# Patient Record
Sex: FEMALE | Race: ASIAN | NOT HISPANIC OR LATINO
[De-identification: names, ages, dates, MRNs, and addresses within clinical notes are randomized per-mention and may not be internally consistent; named-entity substitution may affect disease eponyms.]

---

## 2017-02-21 ENCOUNTER — APPOINTMENT (OUTPATIENT)
Dept: CT IMAGING | Facility: CLINIC | Age: 72
End: 2017-02-21

## 2017-02-21 ENCOUNTER — OUTPATIENT (OUTPATIENT)
Dept: OUTPATIENT SERVICES | Facility: HOSPITAL | Age: 72
LOS: 1 days | End: 2017-02-21
Payer: COMMERCIAL

## 2017-02-21 DIAGNOSIS — Z00.8 ENCOUNTER FOR OTHER GENERAL EXAMINATION: ICD-10-CM

## 2017-02-21 PROCEDURE — 74176 CT ABD & PELVIS W/O CONTRAST: CPT

## 2017-04-20 ENCOUNTER — INPATIENT (INPATIENT)
Facility: HOSPITAL | Age: 72
LOS: 1 days | Discharge: HOME | DRG: 69 | End: 2017-04-22
Attending: INTERNAL MEDICINE | Admitting: INTERNAL MEDICINE
Payer: COMMERCIAL

## 2017-04-20 VITALS
SYSTOLIC BLOOD PRESSURE: 96 MMHG | HEIGHT: 62 IN | OXYGEN SATURATION: 96 % | WEIGHT: 110.01 LBS | DIASTOLIC BLOOD PRESSURE: 56 MMHG | RESPIRATION RATE: 16 BRPM | TEMPERATURE: 98 F | HEART RATE: 68 BPM

## 2017-04-20 DIAGNOSIS — I63.9 CEREBRAL INFARCTION, UNSPECIFIED: ICD-10-CM

## 2017-04-20 DIAGNOSIS — G45.9 TRANSIENT CEREBRAL ISCHEMIC ATTACK, UNSPECIFIED: ICD-10-CM

## 2017-04-20 LAB
ALBUMIN SERPL ELPH-MCNC: 3.3 G/DL — SIGNIFICANT CHANGE UP (ref 3.3–5)
ALP SERPL-CCNC: 64 U/L — SIGNIFICANT CHANGE UP (ref 30–120)
ALT FLD-CCNC: 17 U/L DA — SIGNIFICANT CHANGE UP (ref 10–60)
ANION GAP SERPL CALC-SCNC: 9 MMOL/L — SIGNIFICANT CHANGE UP (ref 5–17)
APTT BLD: 28.4 SEC — SIGNIFICANT CHANGE UP (ref 27.5–37.4)
AST SERPL-CCNC: 23 U/L — SIGNIFICANT CHANGE UP (ref 10–40)
BASOPHILS # BLD AUTO: 0 K/UL — SIGNIFICANT CHANGE UP (ref 0–0.2)
BASOPHILS NFR BLD AUTO: 0.6 % — SIGNIFICANT CHANGE UP (ref 0–2)
BILIRUB SERPL-MCNC: 0.2 MG/DL — SIGNIFICANT CHANGE UP (ref 0.2–1.2)
BUN SERPL-MCNC: 26 MG/DL — HIGH (ref 7–23)
CALCIUM SERPL-MCNC: 9 MG/DL — SIGNIFICANT CHANGE UP (ref 8.4–10.5)
CHLORIDE SERPL-SCNC: 108 MMOL/L — SIGNIFICANT CHANGE UP (ref 96–108)
CO2 SERPL-SCNC: 27 MMOL/L — SIGNIFICANT CHANGE UP (ref 22–31)
CREAT SERPL-MCNC: 0.93 MG/DL — SIGNIFICANT CHANGE UP (ref 0.5–1.3)
EOSINOPHIL # BLD AUTO: 0 K/UL — SIGNIFICANT CHANGE UP (ref 0–0.5)
EOSINOPHIL NFR BLD AUTO: 0.6 % — SIGNIFICANT CHANGE UP (ref 0–6)
ERYTHROCYTE [SEDIMENTATION RATE] IN BLOOD: 3 MM/HR — SIGNIFICANT CHANGE UP (ref 0–20)
GLUCOSE SERPL-MCNC: 110 MG/DL — HIGH (ref 70–99)
HCT VFR BLD CALC: 41.9 % — SIGNIFICANT CHANGE UP (ref 34.5–45)
HGB BLD-MCNC: 13.9 G/DL — SIGNIFICANT CHANGE UP (ref 11.5–15.5)
INR BLD: 0.96 RATIO — SIGNIFICANT CHANGE UP (ref 0.88–1.16)
LYMPHOCYTES # BLD AUTO: 1.1 K/UL — SIGNIFICANT CHANGE UP (ref 1–3.3)
LYMPHOCYTES # BLD AUTO: 15.2 % — SIGNIFICANT CHANGE UP (ref 13–44)
MCHC RBC-ENTMCNC: 29.9 PG — SIGNIFICANT CHANGE UP (ref 27–34)
MCHC RBC-ENTMCNC: 33.2 GM/DL — SIGNIFICANT CHANGE UP (ref 32–36)
MCV RBC AUTO: 90.1 FL — SIGNIFICANT CHANGE UP (ref 80–100)
MONOCYTES # BLD AUTO: 0.3 K/UL — SIGNIFICANT CHANGE UP (ref 0–0.9)
MONOCYTES NFR BLD AUTO: 4.6 % — SIGNIFICANT CHANGE UP (ref 2–14)
NEUTROPHILS # BLD AUTO: 5.8 K/UL — SIGNIFICANT CHANGE UP (ref 1.8–7.4)
NEUTROPHILS NFR BLD AUTO: 78.9 % — HIGH (ref 43–77)
PLATELET # BLD AUTO: 261 K/UL — SIGNIFICANT CHANGE UP (ref 150–400)
POTASSIUM SERPL-MCNC: 4.1 MMOL/L — SIGNIFICANT CHANGE UP (ref 3.5–5.3)
POTASSIUM SERPL-SCNC: 4.1 MMOL/L — SIGNIFICANT CHANGE UP (ref 3.5–5.3)
PROT SERPL-MCNC: 6.8 G/DL — SIGNIFICANT CHANGE UP (ref 6–8.3)
PROTHROM AB SERPL-ACNC: 10.4 SEC — SIGNIFICANT CHANGE UP (ref 9.8–12.7)
RBC # BLD: 4.65 M/UL — SIGNIFICANT CHANGE UP (ref 3.8–5.2)
RBC # FLD: 13 % — SIGNIFICANT CHANGE UP (ref 10.3–14.5)
SODIUM SERPL-SCNC: 144 MMOL/L — SIGNIFICANT CHANGE UP (ref 135–145)
WBC # BLD: 7.3 K/UL — SIGNIFICANT CHANGE UP (ref 3.8–10.5)
WBC # FLD AUTO: 7.3 K/UL — SIGNIFICANT CHANGE UP (ref 3.8–10.5)

## 2017-04-20 PROCEDURE — 93880 EXTRACRANIAL BILAT STUDY: CPT | Mod: 26

## 2017-04-20 PROCEDURE — 93306 TTE W/DOPPLER COMPLETE: CPT | Mod: 26

## 2017-04-20 PROCEDURE — 99291 CRITICAL CARE FIRST HOUR: CPT

## 2017-04-20 PROCEDURE — 70450 CT HEAD/BRAIN W/O DYE: CPT | Mod: 26

## 2017-04-20 PROCEDURE — 93010 ELECTROCARDIOGRAM REPORT: CPT

## 2017-04-20 RX ORDER — ASPIRIN/CALCIUM CARB/MAGNESIUM 324 MG
81 TABLET ORAL DAILY
Qty: 0 | Refills: 0 | Status: DISCONTINUED | OUTPATIENT
Start: 2017-04-20 | End: 2017-04-20

## 2017-04-20 RX ORDER — SODIUM CHLORIDE 9 MG/ML
1000 INJECTION INTRAMUSCULAR; INTRAVENOUS; SUBCUTANEOUS
Qty: 0 | Refills: 0 | Status: DISCONTINUED | OUTPATIENT
Start: 2017-04-20 | End: 2017-04-22

## 2017-04-20 RX ORDER — ATORVASTATIN CALCIUM 80 MG/1
10 TABLET, FILM COATED ORAL AT BEDTIME
Qty: 0 | Refills: 0 | Status: DISCONTINUED | OUTPATIENT
Start: 2017-04-20 | End: 2017-04-22

## 2017-04-20 RX ORDER — ASPIRIN/CALCIUM CARB/MAGNESIUM 324 MG
325 TABLET ORAL DAILY
Qty: 0 | Refills: 0 | Status: DISCONTINUED | OUTPATIENT
Start: 2017-04-20 | End: 2017-04-22

## 2017-04-20 RX ORDER — ENOXAPARIN SODIUM 100 MG/ML
40 INJECTION SUBCUTANEOUS DAILY
Qty: 0 | Refills: 0 | Status: DISCONTINUED | OUTPATIENT
Start: 2017-04-20 | End: 2017-04-22

## 2017-04-20 RX ORDER — ASPIRIN/CALCIUM CARB/MAGNESIUM 324 MG
324 TABLET ORAL DAILY
Qty: 0 | Refills: 0 | Status: DISCONTINUED | OUTPATIENT
Start: 2017-04-20 | End: 2017-04-20

## 2017-04-20 RX ADMIN — SODIUM CHLORIDE 75 MILLILITER(S): 9 INJECTION INTRAMUSCULAR; INTRAVENOUS; SUBCUTANEOUS at 21:35

## 2017-04-20 RX ADMIN — ENOXAPARIN SODIUM 40 MILLIGRAM(S): 100 INJECTION SUBCUTANEOUS at 21:32

## 2017-04-20 RX ADMIN — Medication 325 MILLIGRAM(S): at 21:44

## 2017-04-20 RX ADMIN — ATORVASTATIN CALCIUM 10 MILLIGRAM(S): 80 TABLET, FILM COATED ORAL at 21:33

## 2017-04-20 NOTE — ED ADULT NURSE NOTE - OBJECTIVE STATEMENT
Pt with no past hx; son states difficulty speaking. As per pt's son, pt went to sleep at approximately 14:00 and when she woke up at 15:00, she was unable to express herself and her speech may have been garbled. Pt is experiencing no overt weakness. PERRL B/L upper & lower strength + & =  sensory intact. Pt with presenting symptoms of expressive aphasia.

## 2017-04-20 NOTE — ED PROVIDER NOTE - MEDICAL DECISION MAKING DETAILS
spoke at length with family,rads and neuro...initially, time of stroke seemed to indicate that thrombolysis was an option.however, pts sx did improve and then son stated that sx started earlier and, therefore, outside the window of thrombolytic use.

## 2017-04-20 NOTE — H&P ADULT - FAMILY HISTORY
Father  Still living? No  Cerebrovascular accident (CVA), Age at diagnosis: Age Unknown     Sibling  Still living? Yes, Estimated age: Age Unknown  Cerebrovascular accident (CVA), Age at diagnosis: Age Unknown

## 2017-04-20 NOTE — H&P ADULT - HISTORY OF PRESENT ILLNESS
72 yr old with no significant past medical history except pyelonephritis in 2014, on no medication, generally in good health, today at 1 pm felt dizzy so took nap and when wpke up was having aphasia and was unable to get up from chair, and as per son she was making no sense with her speech, patient is chinese speaking .   In ED patient was still having aphasia, had CT head which was negative for CVA, patient improved in about 40 minutes to darinel 90 percent as per son, she is able to speak now and has full poer in all extremeties.  NIHSS scal 2 at admission.  has strong family h/o CVA father and brother had CVA and paralysis  no seizure no incontinence, no h/o HTN, HLd

## 2017-04-20 NOTE — ED PROVIDER NOTE - DETAILS:
Uri Bedoya MD - The scribe's documentation has been prepared under my direction and personally reviewed by me in its entirety. I confirm that the note above accurately reflects all work, treatment, procedures, and medical decision making performed by me.

## 2017-04-20 NOTE — ED ADULT NURSE NOTE - CHPI ED SYMPTOMS NEG
no dizziness/no numbness/no fever/no blurred vision/no loss of consciousness/no weakness/no nausea/no vomiting

## 2017-04-20 NOTE — H&P ADULT - ATTENDING COMMENTS
I have discussed care plan with patient and HCP,expressed understanding of problems treatment and their effect and side effects, alternatives in detail,I have asked if they have any questions and concerns and appropriately addressed them to best of my ability  Reviewed all diagonostic tests, lab results and drug drug interactions, and medications  90 minutes spent on this visit, 50% visit time spent in care co-ordination with other attendings and counselling patient

## 2017-04-20 NOTE — H&P ADULT - ASSESSMENT
72 yr old with no significant past history with strong family h/o CVA, admitted with expressive aphasia with some weakness,admitted for further management, possible TIA,

## 2017-04-20 NOTE — CONSULT NOTE ADULT - SUBJECTIVE AND OBJECTIVE BOX
Clinical impression is most likely tia rule out cerebrovascular accident.    I would recommend telemetry evaluation to rule out underlying arrhythmia.  I would recommend echocardiogram to evaluate ejection fraction.  I would recommend carotid Doppler's to evaluate for carotid stenosis.  I would recommend MRI/MRA of the brain.  I would recommend to check TSH and lipid panel.  I will start the patient on aspirin 325 mg once a day.  I will recommend cholesterol medications.  keep SBP above 150 and below 190 if possible

## 2017-04-20 NOTE — ED PROVIDER NOTE - OBJECTIVE STATEMENT
71 y/o F pt with no past history presents to the ED c/o expressive aphasia. As per pt's son, pt went to sleep at approximately 14:00 and when she woke up at 15:00, she was unable to express herself and her speech may have been garbled. Pt is experiencing no overt weakness. 73 y/o chinese F pt with no past history presents to the ED with inability to speak/express self.. As per pt's son, and , she was unable to express herself and her speech may have been garbled. Pt is experiencing no overt weakness.no ha,fever..sx began at approx 1p.m.(3hrs and 40 mins before er arrival)In emergency dept pt was able to speak more after being in er for 40 minutes than when she entered er.

## 2017-04-20 NOTE — ED PROVIDER NOTE - NEUROLOGICAL COORDINATION
Discharge instructions given to pt. pt verbalized understanding discharge instructions. Patient left emergency department by foot  With self, in good condition. 3 Prescriptions given. Armband removed and shredded. normal

## 2017-04-20 NOTE — ED ADULT NURSE REASSESSMENT NOTE - NS ED NURSE REASSESS COMMENT FT1
As per son, pt much better than when she came in; speaking well with very mild hesitation; not fully at baseline at this time.  Admitting MD at bedside.

## 2017-04-20 NOTE — STROKE CODE NOTE - NIH STROKE SCALE: 9. BEST LANGUAGE, QM
(2) Severe aphasia; all communication is through fragmentary expression; great need for inference, questioning, and guessing by the listener. Range of information that can be exchanged is limited; listener carries burden of communication. Examiner cannot identify materials provided from patient response.
(2) Severe aphasia; all communication is through fragmentary expression; great need for inference, questioning, and guessing by the listener. Range of information that can be exchanged is limited; listener carries burden of communication. Examiner cannot identify materials provided from patient response.

## 2017-04-20 NOTE — ED PROVIDER NOTE - CRITICAL CARE PROVIDED
consultation with other physicians/consult w/ pt's family directly relating to pts condition/direct patient care (not related to procedure)/documentation/interpretation of diagnostic studies/additional history taking

## 2017-04-21 ENCOUNTER — OUTPATIENT (OUTPATIENT)
Dept: OUTPATIENT SERVICES | Facility: HOSPITAL | Age: 72
LOS: 1 days | End: 2017-04-21
Payer: COMMERCIAL

## 2017-04-21 DIAGNOSIS — I63.9 CEREBRAL INFARCTION, UNSPECIFIED: ICD-10-CM

## 2017-04-21 DIAGNOSIS — I09.9 RHEUMATIC HEART DISEASE, UNSPECIFIED: ICD-10-CM

## 2017-04-21 LAB
ANION GAP SERPL CALC-SCNC: 9 MMOL/L — SIGNIFICANT CHANGE UP (ref 5–17)
BUN SERPL-MCNC: 19 MG/DL — SIGNIFICANT CHANGE UP (ref 7–23)
CALCIUM SERPL-MCNC: 8.6 MG/DL — SIGNIFICANT CHANGE UP (ref 8.4–10.5)
CHLORIDE SERPL-SCNC: 110 MMOL/L — HIGH (ref 96–108)
CHOLEST SERPL-MCNC: 160 MG/DL — SIGNIFICANT CHANGE UP (ref 10–199)
CO2 SERPL-SCNC: 27 MMOL/L — SIGNIFICANT CHANGE UP (ref 22–31)
CREAT SERPL-MCNC: 0.86 MG/DL — SIGNIFICANT CHANGE UP (ref 0.5–1.3)
GLUCOSE SERPL-MCNC: 95 MG/DL — SIGNIFICANT CHANGE UP (ref 70–99)
HCT VFR BLD CALC: 40.2 % — SIGNIFICANT CHANGE UP (ref 34.5–45)
HDLC SERPL-MCNC: 97 MG/DL — SIGNIFICANT CHANGE UP (ref 40–125)
HGB BLD-MCNC: 13.6 G/DL — SIGNIFICANT CHANGE UP (ref 11.5–15.5)
LIPID PNL WITH DIRECT LDL SERPL: 50 MG/DL — SIGNIFICANT CHANGE UP
MCHC RBC-ENTMCNC: 30.4 PG — SIGNIFICANT CHANGE UP (ref 27–34)
MCHC RBC-ENTMCNC: 33.8 GM/DL — SIGNIFICANT CHANGE UP (ref 32–36)
MCV RBC AUTO: 89.9 FL — SIGNIFICANT CHANGE UP (ref 80–100)
PLATELET # BLD AUTO: 246 K/UL — SIGNIFICANT CHANGE UP (ref 150–400)
POTASSIUM SERPL-MCNC: 4.1 MMOL/L — SIGNIFICANT CHANGE UP (ref 3.5–5.3)
POTASSIUM SERPL-SCNC: 4.1 MMOL/L — SIGNIFICANT CHANGE UP (ref 3.5–5.3)
RBC # BLD: 4.46 M/UL — SIGNIFICANT CHANGE UP (ref 3.8–5.2)
RBC # FLD: 13.1 % — SIGNIFICANT CHANGE UP (ref 10.3–14.5)
SODIUM SERPL-SCNC: 146 MMOL/L — HIGH (ref 135–145)
TOTAL CHOLESTEROL/HDL RATIO MEASUREMENT: 1.6 RATIO — LOW (ref 3.3–7.1)
TRIGL SERPL-MCNC: 67 MG/DL — SIGNIFICANT CHANGE UP (ref 10–149)
VIT B12 SERPL-MCNC: 439 PG/ML — SIGNIFICANT CHANGE UP (ref 243–894)
WBC # BLD: 6.2 K/UL — SIGNIFICANT CHANGE UP (ref 3.8–10.5)
WBC # FLD AUTO: 6.2 K/UL — SIGNIFICANT CHANGE UP (ref 3.8–10.5)

## 2017-04-21 PROCEDURE — 70551 MRI BRAIN STEM W/O DYE: CPT | Mod: 26

## 2017-04-21 PROCEDURE — 70551 MRI BRAIN STEM W/O DYE: CPT

## 2017-04-21 PROCEDURE — 70544 MR ANGIOGRAPHY HEAD W/O DYE: CPT | Mod: 26,59

## 2017-04-21 PROCEDURE — 70544 MR ANGIOGRAPHY HEAD W/O DYE: CPT

## 2017-04-21 RX ADMIN — SODIUM CHLORIDE 75 MILLILITER(S): 9 INJECTION INTRAMUSCULAR; INTRAVENOUS; SUBCUTANEOUS at 10:15

## 2017-04-21 RX ADMIN — ENOXAPARIN SODIUM 40 MILLIGRAM(S): 100 INJECTION SUBCUTANEOUS at 11:16

## 2017-04-21 RX ADMIN — Medication 325 MILLIGRAM(S): at 11:17

## 2017-04-21 RX ADMIN — ATORVASTATIN CALCIUM 10 MILLIGRAM(S): 80 TABLET, FILM COATED ORAL at 21:15

## 2017-04-21 NOTE — DISCHARGE NOTE ADULT - CARE PROVIDER_API CALL
Martir Dixon (MD), Internal Medicine  [address]  [place] [address]  Phone: [phone]  Fax: [phone]    Emely Lim (MBEMANULE; MD), Internal Medicine  [address] [place] [address]  Phone: [phone]  Fax: [phone]

## 2017-04-21 NOTE — PHYSICAL THERAPY INITIAL EVALUATION ADULT - ADDITIONAL COMMENTS
pvt home 2 story, no steps to enter, bedroom main floor. 1 flight 11 steps to basement w/ HR. Pt does not drive. Pt does not have and DME.

## 2017-04-21 NOTE — DISCHARGE NOTE ADULT - NS AS DC STROKE ED MATERIALS
Prescribed Medications/Advancing age is a risk factor for strokes/Stroke Education Booklet/Call 911 for Stroke/Need for Followup After Discharge/Stroke Warning Signs and Symptoms/Risk Factors for Stroke Prescribed Medications/Stroke Education Booklet/Risk Factors for Stroke/Stroke Warning Signs and Symptoms/Call 911 for Stroke/Need for Followup After Discharge

## 2017-04-21 NOTE — DISCHARGE NOTE ADULT - HOSPITAL COURSE
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

## 2017-04-21 NOTE — DISCHARGE NOTE ADULT - PATIENT PORTAL LINK FT
“You can access the [facility] Patient Portal, offered by [facility], by registering with the following website: [email or web address]”

## 2017-04-21 NOTE — CONSULT NOTE ADULT - SUBJECTIVE AND OBJECTIVE BOX
History of Present Illness: The patient is a 72 year old female with no prior past medical history who presents with possible stroke. Yesterday, she had two episodes of dizziness associated with right leg weakness. She also noted some difficulty speaking, but denies visual changes, numbness, weakness in other body parts. She denies shortness of breath, palpitations, chest pain. No prior cardiac history. Unlimited exercise tolerance.    Past Medical/Surgical History:  None    Medications:  None    Family History: Non-contributory family history of premature cardiovascular atherosclerotic disease    Social History: No tobacco, alcohol or drug use    Review of Systems:  General: No fevers, chills, weight loss or gain  Skin: No rashes, color changes  Cardiovascular: No chest pain, orthopnea  Respiratory: No shortness of breath, cough  Gastrointestinal: No nausea, abdominal pain  Genitourinary: No incontinence, pain with urination  Musculoskeletal: No pain, swelling, decreased range of motion  Neurological: No headache, weakness  Psychiatric: No depression, anxiety  Endocrine: No weight loss or gain, increased thirst  All other systems are comprehensively negative.    Physical Exam:  Vitals:        Vital Signs Last 24 Hrs  T(C): 36.8, Max: 36.9 (04-20 @ 16:33)  T(F): 98.3, Max: 98.5 (04-20 @ 16:33)  HR: 72 (60 - 72)  BP: 105/60 (96/56 - 120/71)  BP(mean): --  RR: 17 (13 - 23)  SpO2: 99% (96% - 99%)  General: NAD  Neck: No JVD, no carotid bruit  Lungs: CTAB  CV: RRR, nl S1/S2, no M/R/G  Abdomen: S/NT/ND, +BS  Extremities: No LE edema, no cyanosis    Labs:                        13.6   6.2   )-----------( 246      ( 21 Apr 2017 06:43 )             40.2     04-21    146<H>  |  110<H>  |  19  ----------------------------<  95  4.1   |  27  |  0.86    Ca    8.6      21 Apr 2017 06:43    TPro  6.8  /  Alb  3.3  /  TBili  0.2  /  DBili  x   /  AST  23  /  ALT  17  /  AlkPhos  64  04-20        PT/INR - ( 20 Apr 2017 16:59 )   PT: 10.4 sec;   INR: 0.96 ratio         PTT - ( 20 Apr 2017 16:59 )  PTT:28.4 sec

## 2017-04-21 NOTE — CONSULT NOTE ADULT - ASSESSMENT
The patient is a 72 year old female with no prior medical history who presents with possible TIA/CVA. An echocardiogram was done revealing severe mitral stenosis (MVA 0.99 cm2, mean gradient 13 mmHg) with a severely dilated left atrium. There is no evidence of atrial arrhythmias; however, she is at high risk for this and thromboembolic disease given the presence of severe rheumatic MS with a severely dilated LA.    Plan:  - Continue to monitor on telemetry  - If there is evidence of CVA on MRI, would initiate anticoagulation with warfarin as there is presumably underlying AF. If MRI is negative and no atrial arrhythmias documented, would continue aspirin.  - No need for beta blockers or diuretics at this time as the patient's resting heart rate is in low 60s and she is asymptomatic.  - Consider outpatient stress echocardiogram to assess for symptoms and change in pulmonary pressures with exertion

## 2017-04-21 NOTE — SWALLOW BEDSIDE ASSESSMENT ADULT - COMMENTS
Pt A+Ox4.  Pt is Mandarin speaking; her son was at bedside and translated.  Pt admitted with aphasia, possible TIA.  Pt's swallow function WNL.  She is safely tolerating regular consistencies with thin liquids.

## 2017-04-21 NOTE — PHYSICAL THERAPY INITIAL EVALUATION ADULT - CRITERIA FOR SKILLED THERAPEUTIC INTERVENTIONS
anticipated discharge recommendation/anticipated equipment needs at discharge/d/c home no PT needs no DME required

## 2017-04-22 VITALS
RESPIRATION RATE: 18 BRPM | SYSTOLIC BLOOD PRESSURE: 98 MMHG | TEMPERATURE: 98 F | DIASTOLIC BLOOD PRESSURE: 69 MMHG | HEART RATE: 70 BPM | OXYGEN SATURATION: 96 %

## 2017-04-22 LAB
CHOLEST SERPL-MCNC: 138 MG/DL — SIGNIFICANT CHANGE UP (ref 10–199)
HDLC SERPL-MCNC: 79 MG/DL — SIGNIFICANT CHANGE UP (ref 40–125)
LIPID PNL WITH DIRECT LDL SERPL: 47 MG/DL — SIGNIFICANT CHANGE UP
TOTAL CHOLESTEROL/HDL RATIO MEASUREMENT: 1.7 RATIO — LOW (ref 3.3–7.1)
TRIGL SERPL-MCNC: 59 MG/DL — SIGNIFICANT CHANGE UP (ref 10–149)

## 2017-04-22 PROCEDURE — 85652 RBC SED RATE AUTOMATED: CPT

## 2017-04-22 PROCEDURE — 93005 ELECTROCARDIOGRAM TRACING: CPT

## 2017-04-22 PROCEDURE — 93880 EXTRACRANIAL BILAT STUDY: CPT

## 2017-04-22 PROCEDURE — 85027 COMPLETE CBC AUTOMATED: CPT

## 2017-04-22 PROCEDURE — 93306 TTE W/DOPPLER COMPLETE: CPT

## 2017-04-22 PROCEDURE — 85730 THROMBOPLASTIN TIME PARTIAL: CPT

## 2017-04-22 PROCEDURE — 96372 THER/PROPH/DIAG INJ SC/IM: CPT

## 2017-04-22 PROCEDURE — 80061 LIPID PANEL: CPT

## 2017-04-22 PROCEDURE — 99291 CRITICAL CARE FIRST HOUR: CPT | Mod: 25

## 2017-04-22 PROCEDURE — 80048 BASIC METABOLIC PNL TOTAL CA: CPT

## 2017-04-22 PROCEDURE — 85610 PROTHROMBIN TIME: CPT

## 2017-04-22 PROCEDURE — 97161 PT EVAL LOW COMPLEX 20 MIN: CPT

## 2017-04-22 PROCEDURE — 82607 VITAMIN B-12: CPT

## 2017-04-22 PROCEDURE — 80053 COMPREHEN METABOLIC PANEL: CPT

## 2017-04-22 PROCEDURE — 70450 CT HEAD/BRAIN W/O DYE: CPT

## 2017-04-22 RX ORDER — ATORVASTATIN CALCIUM 80 MG/1
1 TABLET, FILM COATED ORAL
Qty: 0 | Refills: 0 | COMMUNITY
Start: 2017-04-22

## 2017-04-22 RX ORDER — ASPIRIN/CALCIUM CARB/MAGNESIUM 324 MG
1 TABLET ORAL
Qty: 30 | Refills: 0
Start: 2017-04-22 | End: 2017-05-22

## 2017-04-22 RX ORDER — ASPIRIN/CALCIUM CARB/MAGNESIUM 324 MG
1 TABLET ORAL
Qty: 0 | Refills: 0 | COMMUNITY
Start: 2017-04-22

## 2017-04-22 RX ORDER — ATORVASTATIN CALCIUM 80 MG/1
1 TABLET, FILM COATED ORAL
Qty: 30 | Refills: 0
Start: 2017-04-22 | End: 2017-05-22

## 2017-04-22 RX ADMIN — ENOXAPARIN SODIUM 40 MILLIGRAM(S): 100 INJECTION SUBCUTANEOUS at 11:25

## 2017-04-22 RX ADMIN — SODIUM CHLORIDE 75 MILLILITER(S): 9 INJECTION INTRAMUSCULAR; INTRAVENOUS; SUBCUTANEOUS at 11:25

## 2017-04-22 RX ADMIN — Medication 325 MILLIGRAM(S): at 11:25

## 2017-04-22 RX ADMIN — SODIUM CHLORIDE 75 MILLILITER(S): 9 INJECTION INTRAMUSCULAR; INTRAVENOUS; SUBCUTANEOUS at 05:19

## 2017-04-22 NOTE — PROGRESS NOTE ADULT - ATTENDING COMMENTS
I have discussed care plan with patient and HCP,expressed understanding of problems treatment and their effect and side effects, alternatives in detail,I have asked if they have any questions and concerns and appropriately addressed them to best of my ability  Reviewed all diagonostic tests, lab results and drug drug interactions, and medications  60 minutes spent on this visit, 50% visit time spent in care co-ordination with other attendings and counselling patient  discharge plan discussed with Patient and family
45 minutes spent on this visit, 50% visit time spent in care co-ordination with other attendings and counselling patient

## 2017-04-22 NOTE — PROGRESS NOTE ADULT - SUBJECTIVE AND OBJECTIVE BOX
Neurology follow up note    DOMINGO [id number]      Interval History:    Patient feels ok no new complaints. seen with spouse     MEDICATIONS    atorvastatin 10milliGRAM(s) Oral at bedtime  enoxaparin Injectable 40milliGRAM(s) SubCutaneous daily  sodium chloride 0.9%. 1000milliLiter(s) IV Continuous <Continuous>  aspirin 325milliGRAM(s) Oral daily      Allergies    No Known Allergies    Intolerances        Height (cm): 157.5 (04-20 @ 16:33)  Weight (kg): 49.9 (04-20 @ 16:33)  BMI (kg/m2): 20.1 (04-20 @ 16:33)    Vital Signs Last 24 Hrs  T(C): 36.6, Max: 36.9 (04-20 @ 16:33)  T(F): 97.9, Max: 98.5 (04-20 @ 16:33)  HR: 61 (60 - 68)  BP: 110/72 (96/56 - 120/71)  BP(mean): --  RR: 18 (13 - 23)  SpO2: 98% (96% - 99%)      REVIEW OF SYSTEMS:     Constitutional: No fever, chills, fatigue, weakness  Eyes: no eye pain, visual disturbances, or discharge  ENT:  No difficulty hearing, tinnitus, vertigo; No sinus or throat pain  Neck: No pain or stiffness  Respiratory: No cough, dyspnea, wheezing   Cardiovascular: No chest pain, palpitations,   Gastrointestinal: No abdominal or epigastric pain. No nausea, vomiting  No diarrhea or constipation.   Genitourinary: No dysuria, frequency, hematuria or incontinence  Neurological: No headaches, lightheadedness, vertigo, numbness or tremors  Psychiatric: No depression, anxiety, mood swings or difficulty sleeping  Musculoskeletal: No joint pain or swelling; No muscle, back or extremity pain  Skin: No itching, burning, rashes or lesions   Lymph Nodes: No enlarged glands  Endocrine: No heat or cold intolerance; No hair loss   Allergy and Immunologic: No hives or eczema    On Neurological Examination:    Mental Status - Patient is alert, awake, oriented X3. C      Follow simple commands  Follow complex commands    Speech -   Fluent                Cranial Nerves - Pupils 3 mm equal and reactive to light,   extraocular eye movements intact.   smile symmetric  intact bilateral NLF    Motor Exam -   Right upper 5/5  Left upper5/5  Right lower5/5  Left lower 5/5      Muscle tone - is normal all over.  No asymmetry is seen.      Sensory    Bilateral intact to light touch    Gait -  normal  ataxia     GENERAL Exam: Nontoxic , No Acute Distress   	  HEENT:  normocephalic, atraumatic  		  LUNGS: Clear bilaterally    	  HEART: Normal S1S2   No murmur RRR        	  GI/ ABDOMEN:  Soft  Non tender    EXTREMITIES:   No Edema  No Clubbing  No Cyanosis No Edema    MUSCULOSKELETAL: Normal Range of Motion  	   SKIN: Normal  No Ecchymosis               LABS:  CBC Full  -  ( 21 Apr 2017 06:43 )  WBC Count : 6.2 K/uL  Hemoglobin : 13.6 g/dL  Hematocrit : 40.2 %  Platelet Count - Automated : 246 K/uL  Mean Cell Volume : 89.9 fl  Mean Cell Hemoglobin : 30.4 pg  Mean Cell Hemoglobin Concentration : 33.8 gm/dL  Auto Neutrophil # : x  Auto Lymphocyte # : x  Auto Monocyte # : x  Auto Eosinophil # : x  Auto Basophil # : x  Auto Neutrophil % : x  Auto Lymphocyte % : x  Auto Monocyte % : x  Auto Eosinophil % : x  Auto Basophil % : x      04-21    146<H>  |  110<H>  |  19  ----------------------------<  95  4.1   |  27  |  0.86    Ca    8.6      21 Apr 2017 06:43    TPro  6.8  /  Alb  3.3  /  TBili  0.2  /  DBili  x   /  AST  23  /  ALT  17  /  AlkPhos  64  04-20    Hemoglobin A1C:     LIVER FUNCTIONS - ( 20 Apr 2017 16:59 )  Alb: 3.3 g/dL / Pro: 6.8 g/dL / ALK PHOS: 64 U/L / ALT: 17 U/L DA / AST: 23 U/L / GGT: x           Vitamin B12   PT/INR - ( 20 Apr 2017 16:59 )   PT: 10.4 sec;   INR: 0.96 ratio         PTT - ( 20 Apr 2017 16:59 )  PTT:28.4 sec      RADIOLOGY    ANALYSIS AND PLAN:  A 72-year-old with episodes of expressive aphasia, dysarthria, and left-sided weakness.  1.	Clinical impression is transient ischemic attack, rule out cerebrovascular accident.  2.	I would recommend telemetry evaluation to rule out underlying arrhythmia.  3.	I would recommend MRI/MRA of the brain to evaluate cerebrovascular accident.  4.	I would recommend aspirin 325 once a day.  5.	I would recommend statin.  6.	I spoke with family members in great detail, son's name is Pierre.  His telephone number is [phone].  7.	if mri normal only from neurology ok for discharge planning     Thank you for the courtesy of this consultation.      Physical therapy evaluation.  OOB to chair/ambulation with assistance only.  Advanced care planning was discussed with family.  Pain is accessed and addressed.  Pt was screened for signs of clinical depression. Appropriate referral made.  Risk of falls accessed. Fall prevention and plan of care was discussed with family.  Pt is screened for tobacco and alcohol use. Counseling was done for smoking and alcohol cessation.  Use of narcotic pain meds was discussed Pt is advised to use narcotic meds wisely and to refrain from over using them.  Plan of care was discussed with family. Questions answered.  Would continue to follow.  32 minutes spent on total encounter; more than 50% of the visit was spent counseling and/or coordinating care by the attending physician.
Neurology follow up note    DOMINGO [id number]      Interval History:    Patient feels ok no new complaints.    MEDICATIONS    atorvastatin 10milliGRAM(s) Oral at bedtime  enoxaparin Injectable 40milliGRAM(s) SubCutaneous daily  sodium chloride 0.9%. 1000milliLiter(s) IV Continuous <Continuous>  aspirin 325milliGRAM(s) Oral daily      Allergies    No Known Allergies    Intolerances            Vital Signs Last 24 Hrs  T(C): 36.9, Max: 36.9 (04-21 @ 17:16)  T(F): 98.4, Max: 98.5 (04-21 @ 17:16)  HR: 70 (60 - 88)  BP: 98/69 (98/69 - 115/65)  BP(mean): --  RR: 18 (14 - 18)  SpO2: 96% (95% - 99%)      REVIEW OF SYSTEMS:     Constitutional: No fever, chills, fatigue, weakness  Eyes: no eye pain, visual disturbances, or discharge  ENT:  No difficulty hearing, tinnitus, vertigo; No sinus or throat pain  Neck: No pain or stiffness  Respiratory: No cough, dyspnea, wheezing   Cardiovascular: No chest pain, palpitations,   Gastrointestinal: No abdominal or epigastric pain. No nausea, vomiting  No diarrhea or constipation.   Genitourinary: No dysuria, frequency, hematuria or incontinence  Neurological: No headaches, lightheadedness, vertigo, numbness or tremors  Psychiatric: No depression, anxiety, mood swings or difficulty sleeping  Musculoskeletal: No joint pain or swelling; No muscle, back or extremity pain  Skin: No itching, burning, rashes or lesions   Lymph Nodes: No enlarged glands  Endocrine: No heat or cold intolerance; No hair loss   Allergy and Immunologic: No hives or eczema    On Neurological Examination:    Mental Status - Patient is alert, awake, oriented X3. C      Follow simple commands  Follow complex commands    Speech -   Fluent                Cranial Nerves - Pupils 3 mm equal and reactive to light,   extraocular eye movements intact.   smile symmetric  intact bilateral NLF    Motor Exam -   Right upper 5/5  Left upper5/5  Right lower5/5  Left lower 5/5      Muscle tone - is normal all over.  No asymmetry is seen.      Sensory    Bilateral intact to light touch    Gait -  normal  ataxia     GENERAL Exam: Nontoxic , No Acute Distress   	  HEENT:  normocephalic, atraumatic  		  LUNGS: Clear bilaterally    	  HEART: Normal S1S2   No murmur RRR        	  GI/ ABDOMEN:  Soft  Non tender    EXTREMITIES:   No Edema  No Clubbing  No Cyanosis No Edema    MUSCULOSKELETAL: Normal Range of Motion  	   SKIN: Normal  No Ecchymosis               LABS:  CBC Full  -  ( 21 Apr 2017 06:43 )  WBC Count : 6.2 K/uL  Hemoglobin : 13.6 g/dL  Hematocrit : 40.2 %  Platelet Count - Automated : 246 K/uL  Mean Cell Volume : 89.9 fl  Mean Cell Hemoglobin : 30.4 pg  Mean Cell Hemoglobin Concentration : 33.8 gm/dL  Auto Neutrophil # : x  Auto Lymphocyte # : x  Auto Monocyte # : x  Auto Eosinophil # : x  Auto Basophil # : x  Auto Neutrophil % : x  Auto Lymphocyte % : x  Auto Monocyte % : x  Auto Eosinophil % : x  Auto Basophil % : x      04-21    146<H>  |  110<H>  |  19  ----------------------------<  95  4.1   |  27  |  0.86    Ca    8.6      21 Apr 2017 06:43    TPro  6.8  /  Alb  3.3  /  TBili  0.2  /  DBili  x   /  AST  23  /  ALT  17  /  AlkPhos  64  04-20    Hemoglobin A1C:   Lipid Panel 04-21 @ 15:27  Total Cholesterol, Serum 160  LDL 50  Triglycerides 67    LIVER FUNCTIONS - ( 20 Apr 2017 16:59 )  Alb: 3.3 g/dL / Pro: 6.8 g/dL / ALK PHOS: 64 U/L / ALT: 17 U/L DA / AST: 23 U/L / GGT: x           Vitamin B12 Vitamin B12, Serum: 439 pg/mL (04-21 @ 15:20)    PT/INR - ( 20 Apr 2017 16:59 )   PT: 10.4 sec;   INR: 0.96 ratio         PTT - ( 20 Apr 2017 16:59 )  PTT:28.4 sec      RADIOLOGY    MRI head was normal       ANALYSIS AND PLAN:  A 72-year-old with episodes of expressive aphasia, dysarthria, and left-sided weakness.  1.	Clinical impression is transient ischemic attack, rule out cerebrovascular accident.  2.	I would recommend telemetry evaluation to rule out underlying arrhythmia.  3.	 MRI/MRA of the brain was normal   4.	I would recommend aspirin 325 once a day.  5.	I would recommend statin.  6.	I spoke with family members in great detail, son's name is Pierre.  His telephone number is [phone].  7.	neurology ok for discharge planning     Thank you for the courtesy of this consultation.      Physical therapy evaluation.  OOB to chair/ambulation with assistance only.  Advanced care planning was discussed with family.  Pain is accessed and addressed.  Pt was screened for signs of clinical depression. Appropriate referral made.  Risk of falls accessed. Fall prevention and plan of care was discussed with family.  Pt is screened for tobacco and alcohol use. Counseling was done for smoking and alcohol cessation.  Use of narcotic pain meds was discussed Pt is advised to use narcotic meds wisely and to refrain from over using them.  Plan of care was discussed with family. Questions answered.  Would continue to follow.  32 minutes spent on total encounter; more than 50% of the visit was spent counseling and/or coordinating care by the attending physician.
Patient is a 72y Female with a known history of :  Rheumatic heart disease (I09.9)  Transient cerebral ischemia, unspecified type (G45.9)  CVA (cerebral vascular accident) (I63.9)    HPI:  72 yr old with no significant past medical history except pyelonephritis in 2014, on no medication, generally in good health, today at 1 pm felt dizzy so took nap and when wpke up was having aphasia and was unable to get up from chair, and as per son she was making no sense with her speech, patient is chinese speaking .   In ED patient was still having aphasia, had CT head which was negative for CVA, patient improved in about 40 minutes to darinel 90 percent as per son, she is able to speak now and has full poer in all extremeties.  NIHSS scal 2 at admission.  has strong family h/o CVA father and brother had CVA and paralysis  no seizure no incontinence, no h/o HTN, HLd (20 Apr 2017 19:11)      REVIEW OF SYSTEMS:    CONSTITUTIONAL: No fever, weight loss, or fatigue  EYES: No eye pain, visual disturbances, or discharge  ENMT:  No difficulty hearing, tinnitus, vertigo; No sinus or throat pain  NECK: No pain or stiffness  BREASTS: No pain, masses, or nipple discharge  RESPIRATORY: No cough, wheezing, chills or hemoptysis; No shortness of breath  CARDIOVASCULAR: No chest pain, palpitations, dizziness, or leg swelling  GASTROINTESTINAL: No abdominal or epigastric pain. No nausea, vomiting, or hematemesis; No diarrhea or constipation. No melena or hematochezia.  GENITOURINARY: No dysuria, frequency, hematuria, or incontinence  NEUROLOGICAL: No headaches, memory loss, loss of strength, numbness, or tremors  SKIN: No itching, burning, rashes, or lesions   LYMPH NODES: No enlarged glands  ENDOCRINE: No heat or cold intolerance; No hair loss  MUSCULOSKELETAL: No joint pain or swelling; No muscle, back, or extremity pain  PSYCHIATRIC: No depression, anxiety, mood swings, or difficulty sleeping  HEME/LYMPH: No easy bruising, or bleeding gums  ALLERGY AND IMMUNOLOGIC: No hives or eczema    MEDICATIONS  (STANDING):  atorvastatin 10milliGRAM(s) Oral at bedtime  enoxaparin Injectable 40milliGRAM(s) SubCutaneous daily  sodium chloride 0.9%. 1000milliLiter(s) IV Continuous <Continuous>  aspirin 325milliGRAM(s) Oral daily    MEDICATIONS  (PRN):      ALLERGIES: No Known Allergies      FAMILY HISTORY:  Cerebrovascular accident (CVA) (Sibling)  Cerebrovascular accident (CVA) (Father)      Social history:  Alochol:   Smoking:   Drug Use:   Marital Status:     PHYSICAL EXAMINATION:  -----------------------------  T(C): 36.9, Max: 36.9 (04-21 @ 17:16)  HR: 70 (60 - 88)  BP: 98/69 (98/69 - 115/65)  RR: 18 (14 - 18)  SpO2: 96% (95% - 99%)  Wt(kg): --    I & Os for current day (as of 04-22 @ 11:18)  =============================================  IN:    sodium chloride 0.9%.: 1500 ml    Oral Fluid: 1080 ml    Total IN: 2580 ml  ---------------------------------------------  OUT:    Voided: 700 ml    Total OUT: 700 ml  ---------------------------------------------  Total NET: 1880 ml        Constitutional: well developed, normal appearance, well groomed, well nourished, no deformities and no acute distress.   Eyes: the conjunctiva exhibited no abnormalities and the eyelids demonstrated no xanthelasmas.   HEENT: normal oral mucosa, no oral pallor and no oral cyanosis.   Neck: normal jugular venous A waves present, normal jugular venous V waves present and no jugular venous jauregui A waves.   Pulmonary: no respiratory distress, normal respiratory rhythm and effort, no accessory muscle use and lungs were clear to auscultation bilaterally.   Cardiovascular: heart rate and rhythm were normal, normal S1 and S2 and no murmur, gallop, rub, heave or thrill are present.   Abdomen: soft, non-tender, no hepato-splenomegaly and no abdominal mass palpated.   Musculoskeletal: the gait could not be assessed..   Extremities: no clubbing of the fingernails, no localized cyanosis, no petechial hemorrhages and no ischemic changes.   Skin: normal skin color and pigmentation, no rash, no venous stasis, no skin lesions, no skin ulcer and no xanthoma was observed.   Psychiatric: oriented to person, place, and time, the affect was normal, the mood was normal and not feeling anxious.     LABS:   --------  04-21    146<H>  |  110<H>  |  19  ----------------------------<  95  4.1   |  27  |  0.86    Ca    8.6      21 Apr 2017 06:43    TPro  6.8  /  Alb  3.3  /  TBili  0.2  /  DBili  x   /  AST  23  /  ALT  17  /  AlkPhos  64  04-20                         13.6   6.2   )-----------( 246      ( 21 Apr 2017 06:43 )             40.2     PT/INR - ( 20 Apr 2017 16:59 )   PT: 10.4 sec;   INR: 0.96 ratio         PTT - ( 20 Apr 2017 16:59 )  PTT:28.4 sec      160 mg/dL, 50 mg/dL, 97 mg/dL, 67 mg/dL          Radiology:    EXAM:  US TTE W DOPPLER COMPLETE                                  PROCEDURE DATE:  04/20/2017        INTERPRETATION:  Ordering Physician: DOUG HONG [id number]    Indication: Cerebrovascular accident    Technician: Ángela Lawrence    Study Quality: Satisfactory     Height: 5 feet 2 inches  Weight: 110 pounds    MEASUREMENTS  IVS: 0.8 cm  PWT: 1.0 cm  LA: 5.3 cm  Aortic Root: 2.7 cm  LVIDd: 4.5 cm  LVIDs: 2.8 cm    LVEF: 67%    FINDINGS  Left Ventricle: Normal left ventricular sizeand function. LVEF estimated   at 65%.  Right Ventricle: Normal right ventricular size and function.  Left Atrium: Severe left atrial enlargement.  Right Atrium: Normal right atrium.  Mitral Valve: Calcified mitral valve with decreased opening. Mitral valve   appearance suggests rheumatic valve disease. The mean transmitral   gradient is 13 mmHg, consistent with severe mitral stenosis. Mild mitral   regurgitation.  Aortic Valve: Normal, trileaflet aortic valve.  Tricuspid Valve: Normal tricuspid valve. Mild tricuspid regurgitation.   Pulmonary artery systolic pressure estimated at 40 mmHg, assuming a right   atrial pressure of 3 mmHg, consistent with mild pulmonary hypertension.  Pulmonic Valve: Normal pulmonic valve. Minimal pulmonic insufficiency.  Pericardium/Pleura: No pericardial effusion.      CONCLUSIONS:  1. Normal left ventricular size and function. LVEF estimated at 65%.  2. Severe left atrial enlargement.  3. Mitral valve appearance suggests rheumatic valve disease. Severe   mitral stenosis. Mild mitral regurgitation.  4. Mild pulmonary hypertension.                      RALF FLORES M.D., ATTENDING CARDIOLOGIST  This document has been electronically signed. Apr 21 2017  7:40AM                EXAM:  MRA HEAD W O CONTRAST                            PROCEDURE DATE:  04/21/2017        INTERPRETATION:  MRA head    History: CVA, left-sided weakness    Technique 3-D TOF with 3D MIPs    There is no flow disturbance that would imply stenosis oraneurysm on   either side. The anterior, posterior and middle cerebral circulation   appears roughly symmetric.    Impression:  Normal study.              GERTRUDE BATEMAN M.D., ATTENDING RADIOLOGIST  This document has been electronically signed. Apr 21 2017  1:51PM
Patient is a 72y old  Female who presents with a chief complaint of dysarthria and confusion, aphasia expresive (21 Apr 2017 15:10)      INTERVAL HPI/OVERNIGHT EVENTS:feel well difficulty speaking,able to walk    MEDICATIONS  (STANDING):  atorvastatin 10milliGRAM(s) Oral at bedtime  enoxaparin Injectable 40milliGRAM(s) SubCutaneous daily  sodium chloride 0.9%. 1000milliLiter(s) IV Continuous <Continuous>  aspirin 325milliGRAM(s) Oral daily    MEDICATIONS  (PRN):      Allergies    No Known Allergies    Intolerances          Vital Signs Last 24 Hrs  T(C): 36.9, Max: 36.9 (04-21 @ 17:16)  T(F): 98.4, Max: 98.5 (04-21 @ 17:16)  HR: 70 (60 - 88)  BP: 98/69 (98/69 - 115/65)  BP(mean): --  RR: 18 (14 - 18)  SpO2: 96% (95% - 99%)    LABS:                        13.6   6.2   )-----------( 246      ( 21 Apr 2017 06:43 )             40.2     04-21    146<H>  |  110<H>  |  19  ----------------------------<  95  4.1   |  27  |  0.86    Ca    8.6      21 Apr 2017 06:43    TPro  6.8  /  Alb  3.3  /  TBili  0.2  /  DBili  x   /  AST  23  /  ALT  17  /  AlkPhos  64  04-20    PT/INR - ( 20 Apr 2017 16:59 )   PT: 10.4 sec;   INR: 0.96 ratio         PTT - ( 20 Apr 2017 16:59 )  PTT:28.4 sec    CAPILLARY BLOOD GLUCOSE          I & Os for current day (as of 04-22 @ 11:06)  =============================================  IN: 2580 ml / OUT: 700 ml / NET: 1880 ml        RADIOLOGY & ADDITIONAL TESTS:   MRA head    History: CVA, left-sided weakness    Technique 3-D TOF with 3D MIPs    There is no flow disturbance that would imply stenosis oraneurysm on   either side. The anterior, posterior and middle cerebral circulation   appears roughly symmetric.    Impression:  Normal study.  EXAM:  MRI BRAIN W O CONTRAST                            PROCEDURE DATE:  04/21/2017        INTERPRETATION:  MRI brain without contrast    History CVA    Comparison CT performed the prior day    There is no mass effect, cortical edema or hydrocephalus. Cortical volume   and white matter signal are preserved. There is no evidence of acute   infarct or previous parenchymal hemorrhage. The orbital and sellar   contents and cerebellar tonsils are within normal limits.    IMPRESSION:    Normal brain    Imaging Personally Reviewed:  [ ] YES  [ ] NO    Consultant(s) Notes Reviewed:  [ ] YES  [ ] NO    Care Discussed with Consultants/Other Providers [ ] YES  [ ] NO
Patient is a 72y old  Female who presents with a chief complaint of dysarthria and confusion, aphasia expresive (20 Apr 2017 19:11)  improved speech awaiting MRI    INTERVAL HPI/OVERNIGHT EVENTS:    MEDICATIONS  (STANDING):  atorvastatin 10milliGRAM(s) Oral at bedtime  enoxaparin Injectable 40milliGRAM(s) SubCutaneous daily  sodium chloride 0.9%. 1000milliLiter(s) IV Continuous <Continuous>  aspirin 325milliGRAM(s) Oral daily    MEDICATIONS  (PRN):      Allergies    No Known Allergies    Intolerances          Vital Signs Last 24 Hrs  T(C): 36.8, Max: 36.9 (04-20 @ 16:33)  T(F): 98.2, Max: 98.5 (04-20 @ 16:33)  HR: 88 (60 - 88)  BP: 115/65 (96/56 - 120/71)  BP(mean): --  RR: 17 (13 - 23)  SpO2: 99% (96% - 99%)    LABS:                        13.6   6.2   )-----------( 246      ( 21 Apr 2017 06:43 )             40.2     04-21    146<H>  |  110<H>  |  19  ----------------------------<  95  4.1   |  27  |  0.86    Ca    8.6      21 Apr 2017 06:43    TPro  6.8  /  Alb  3.3  /  TBili  0.2  /  DBili  x   /  AST  23  /  ALT  17  /  AlkPhos  64  04-20    PT/INR - ( 20 Apr 2017 16:59 )   PT: 10.4 sec;   INR: 0.96 ratio         PTT - ( 20 Apr 2017 16:59 )  PTT:28.4 sec    CAPILLARY BLOOD GLUCOSE  122 (20 Apr 2017 18:00)  120 (20 Apr 2017 17:39)        I & Os for 24h ending 04-21 @ 07:00  =============================================  IN: 825 ml / OUT: 2 ml / NET: 823 ml    I & Os for current day (as of 04-21 @ 14:19)  =============================================  IN: 660 ml / OUT: 700 ml / NET: -40 ml        RADIOLOGY & ADDITIONAL TESTS:    Imaging Personally Reviewed:  [ ] YES  [ ] NO    Consultant(s) Notes Reviewed:  [ ] YES  [ ] NO    Care Discussed with Consultants/Other Providers [ ] YES  [ ] NO

## 2017-04-22 NOTE — PROGRESS NOTE ADULT - ASSESSMENT
72 yr old with no significant past history with strong family h/o CVA, admitted with expressive aphasia with some weakness,admitted for further management, possible TIA,has rheumatic heart disease with severe mitral stenosis, for cardio assesement
The patient is a 72 year old female with no prior medical history who presents with possible TIA/CVA. An echocardiogram was done revealing severe mitral stenosis (MVA 0.99 cm2, mean gradient 13 mmHg) with a severely dilated left atrium. There is no evidence of atrial arrhythmias; however, she is at high risk for this and thromboembolic disease given the presence of severe rheumatic MS with a severely dilated LA.    4/22/17 Patient spoke to by phone .  No significant cardiac complaints offered.
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

## 2017-04-22 NOTE — PROGRESS NOTE ADULT - NEUROLOGICAL DETAILS
sensation intact/deep reflexes intact/cranial nerves intact/responds to pain/responds to verbal commands
alert and oriented x 3/sensation intact/deep reflexes intact/responds to pain/responds to verbal commands

## 2017-04-22 NOTE — PROGRESS NOTE ADULT - PROBLEM SELECTOR PROBLEM 2
Transient cerebral ischemia, unspecified type
Rheumatic heart disease
Transient cerebral ischemia, unspecified type

## 2017-04-22 NOTE — PROGRESS NOTE ADULT - PROBLEM SELECTOR PROBLEM 1
CVA (cerebral vascular accident)
Transient cerebral ischemia, unspecified type
CVA (cerebral vascular accident)

## 2017-04-22 NOTE — PROGRESS NOTE ADULT - RS GEN PE MLT RESP DETAILS PC
clear to auscultation bilaterally/respirations non-labored
respirations non-labored/airway patent/good air movement

## 2017-04-22 NOTE — PROGRESS NOTE ADULT - PROBLEM SELECTOR PLAN 2
asa, statib, for MRI today
Aspirin and Lipitor.  Follow-up as out-patient for Stress Echocardiogram and ?LINQ.  Card and information given to patient.
asa, statin,

## 2017-04-22 NOTE — PROGRESS NOTE ADULT - PROBLEM SELECTOR PLAN 1
ct head negative, expressive aphsia, aspirin statin, monitor lipids, neuro consult
Being followed by Neurology.  Continue aspirin and Lipitor.
ct head negative, expressive aphsia, aspirin statin,lipids normal, pt improved to baseline likely TIA, cva ruled out

## 2017-04-22 NOTE — PROGRESS NOTE ADULT - VASCULAR
Equal and normal pulses (carotid, femoral, dorsalis pedis)
Equal and normal pulses (carotid, femoral, dorsalis pedis)

## 2017-09-27 ENCOUNTER — APPOINTMENT (OUTPATIENT)
Dept: ULTRASOUND IMAGING | Facility: CLINIC | Age: 72
End: 2017-09-27

## 2017-09-27 ENCOUNTER — OUTPATIENT (OUTPATIENT)
Dept: OUTPATIENT SERVICES | Facility: HOSPITAL | Age: 72
LOS: 1 days | End: 2017-09-27
Payer: COMMERCIAL

## 2017-09-27 DIAGNOSIS — Z00.8 ENCOUNTER FOR OTHER GENERAL EXAMINATION: ICD-10-CM

## 2017-09-27 PROCEDURE — 76700 US EXAM ABDOM COMPLETE: CPT | Mod: 26

## 2017-09-27 PROCEDURE — 76700 US EXAM ABDOM COMPLETE: CPT

## 2017-11-01 ENCOUNTER — APPOINTMENT (OUTPATIENT)
Dept: MAMMOGRAPHY | Facility: CLINIC | Age: 72
End: 2017-11-01

## 2017-11-01 ENCOUNTER — APPOINTMENT (OUTPATIENT)
Dept: ULTRASOUND IMAGING | Facility: CLINIC | Age: 72
End: 2017-11-01

## 2017-11-01 ENCOUNTER — OUTPATIENT (OUTPATIENT)
Dept: OUTPATIENT SERVICES | Facility: HOSPITAL | Age: 72
LOS: 1 days | End: 2017-11-01
Payer: COMMERCIAL

## 2017-11-01 DIAGNOSIS — Z00.8 ENCOUNTER FOR OTHER GENERAL EXAMINATION: ICD-10-CM

## 2017-11-01 PROCEDURE — 77063 BREAST TOMOSYNTHESIS BI: CPT | Mod: 26

## 2017-11-01 PROCEDURE — 76641 ULTRASOUND BREAST COMPLETE: CPT

## 2017-11-01 PROCEDURE — 76641 ULTRASOUND BREAST COMPLETE: CPT | Mod: 26,50

## 2017-11-01 PROCEDURE — G0202: CPT | Mod: 26

## 2017-11-01 PROCEDURE — 77063 BREAST TOMOSYNTHESIS BI: CPT

## 2017-11-01 PROCEDURE — 77067 SCR MAMMO BI INCL CAD: CPT

## 2017-12-19 ENCOUNTER — OUTPATIENT (OUTPATIENT)
Dept: OUTPATIENT SERVICES | Facility: HOSPITAL | Age: 72
LOS: 1 days | End: 2017-12-19
Payer: COMMERCIAL

## 2017-12-19 ENCOUNTER — APPOINTMENT (OUTPATIENT)
Dept: RADIOLOGY | Facility: CLINIC | Age: 72
End: 2017-12-19
Payer: MEDICARE

## 2017-12-19 DIAGNOSIS — Z00.8 ENCOUNTER FOR OTHER GENERAL EXAMINATION: ICD-10-CM

## 2017-12-19 PROCEDURE — 72110 X-RAY EXAM L-2 SPINE 4/>VWS: CPT

## 2017-12-19 PROCEDURE — 72110 X-RAY EXAM L-2 SPINE 4/>VWS: CPT | Mod: 26

## 2018-11-05 PROBLEM — N12 TUBULO-INTERSTITIAL NEPHRITIS, NOT SPECIFIED AS ACUTE OR CHRONIC: Chronic | Status: ACTIVE | Noted: 2017-04-20

## 2018-11-08 ENCOUNTER — OUTPATIENT (OUTPATIENT)
Dept: OUTPATIENT SERVICES | Facility: HOSPITAL | Age: 73
LOS: 1 days | End: 2018-11-08
Payer: COMMERCIAL

## 2018-11-08 ENCOUNTER — APPOINTMENT (OUTPATIENT)
Dept: ULTRASOUND IMAGING | Facility: CLINIC | Age: 73
End: 2018-11-08

## 2018-11-08 ENCOUNTER — APPOINTMENT (OUTPATIENT)
Dept: MAMMOGRAPHY | Facility: CLINIC | Age: 73
End: 2018-11-08

## 2018-11-08 DIAGNOSIS — Z00.8 ENCOUNTER FOR OTHER GENERAL EXAMINATION: ICD-10-CM

## 2018-11-08 PROCEDURE — 77067 SCR MAMMO BI INCL CAD: CPT

## 2018-11-08 PROCEDURE — 76641 ULTRASOUND BREAST COMPLETE: CPT

## 2018-11-08 PROCEDURE — 77067 SCR MAMMO BI INCL CAD: CPT | Mod: 26

## 2018-11-08 PROCEDURE — 77063 BREAST TOMOSYNTHESIS BI: CPT | Mod: 26

## 2018-11-08 PROCEDURE — 76641 ULTRASOUND BREAST COMPLETE: CPT | Mod: 26,50

## 2018-11-08 PROCEDURE — 77063 BREAST TOMOSYNTHESIS BI: CPT

## 2019-02-19 NOTE — STROKE CODE NOTE - CT READING
Spoke to Dr. Parish Reddy for peer to peer - approved from his standpoint with Tamiko Manning - however will submit his opinion to the insurance and they will make their final decision and send via fax. No acute findings

## 2019-06-27 NOTE — DISCHARGE NOTE ADULT - CARE PLAN
Ambulatory Principal Discharge DX:	Transient cerebral ischemia, unspecified type  Goal:	improved  Instructions for follow-up, activity and diet:	aspirin statin  Secondary Diagnosis:	Rheumatic heart disease  Instructions for follow-up, activity and diet:	cont aspirin f up with cardiologist

## 2019-11-21 ENCOUNTER — APPOINTMENT (OUTPATIENT)
Dept: ULTRASOUND IMAGING | Facility: CLINIC | Age: 74
End: 2019-11-21
Payer: MEDICARE

## 2019-11-21 ENCOUNTER — OUTPATIENT (OUTPATIENT)
Dept: OUTPATIENT SERVICES | Facility: HOSPITAL | Age: 74
LOS: 1 days | End: 2019-11-21
Payer: COMMERCIAL

## 2019-11-21 ENCOUNTER — APPOINTMENT (OUTPATIENT)
Dept: MAMMOGRAPHY | Facility: CLINIC | Age: 74
End: 2019-11-21
Payer: MEDICARE

## 2019-11-21 ENCOUNTER — APPOINTMENT (OUTPATIENT)
Dept: RADIOLOGY | Facility: CLINIC | Age: 74
End: 2019-11-21
Payer: MEDICARE

## 2019-11-21 DIAGNOSIS — Z00.8 ENCOUNTER FOR OTHER GENERAL EXAMINATION: ICD-10-CM

## 2019-11-21 PROCEDURE — 76641 ULTRASOUND BREAST COMPLETE: CPT

## 2019-11-21 PROCEDURE — 77063 BREAST TOMOSYNTHESIS BI: CPT | Mod: 26

## 2019-11-21 PROCEDURE — 77080 DXA BONE DENSITY AXIAL: CPT | Mod: 26

## 2019-11-21 PROCEDURE — 77067 SCR MAMMO BI INCL CAD: CPT | Mod: 26

## 2019-11-21 PROCEDURE — 77063 BREAST TOMOSYNTHESIS BI: CPT

## 2019-11-21 PROCEDURE — 76641 ULTRASOUND BREAST COMPLETE: CPT | Mod: 26,50

## 2019-11-21 PROCEDURE — 77067 SCR MAMMO BI INCL CAD: CPT

## 2019-11-21 PROCEDURE — 77080 DXA BONE DENSITY AXIAL: CPT

## 2020-06-30 NOTE — ED PROVIDER NOTE - NS ED MD SCRIBE ATTENDING SCRIBE SECTIONS
PHYSICAL EXAM/REVIEW OF SYSTEMS/DISPOSITION/VITAL SIGNS( Pullset)/HISTORY OF PRESENT ILLNESS/PAST MEDICAL/SURGICAL/SOCIAL HISTORY detailed exam color normal/warm and dry

## 2020-11-21 ENCOUNTER — APPOINTMENT (OUTPATIENT)
Dept: MAMMOGRAPHY | Facility: CLINIC | Age: 75
End: 2020-11-21
Payer: MEDICARE

## 2020-11-21 ENCOUNTER — OUTPATIENT (OUTPATIENT)
Dept: OUTPATIENT SERVICES | Facility: HOSPITAL | Age: 75
LOS: 1 days | End: 2020-11-21
Payer: COMMERCIAL

## 2020-11-21 DIAGNOSIS — Z00.8 ENCOUNTER FOR OTHER GENERAL EXAMINATION: ICD-10-CM

## 2020-11-21 PROCEDURE — 77063 BREAST TOMOSYNTHESIS BI: CPT | Mod: 26

## 2020-11-21 PROCEDURE — 77063 BREAST TOMOSYNTHESIS BI: CPT

## 2020-11-21 PROCEDURE — 77067 SCR MAMMO BI INCL CAD: CPT | Mod: 26

## 2020-11-21 PROCEDURE — 77067 SCR MAMMO BI INCL CAD: CPT

## 2021-01-15 NOTE — DISCHARGE NOTE ADULT - MEDICATION SUMMARY - MEDICATIONS TO TAKE
Detail Level: Detailed
General Sunscreen Counseling: I recommended a broad spectrum sunscreen with a SPF of 30 or higher.  I explained that SPF 30 sunscreens block approximately 97 percent of the sun's harmful rays.  Sunscreens should be applied at least 15 minutes prior to expected sun exposure and then every 2 hours after that as long as sun exposure continues. If swimming or exercising sunscreen should be reapplied every 45 minutes to an hour after getting wet or sweating. I also recommended a lip balm with a sunscreen as well. Sun protective clothing can be used in lieu of sunscreen but must be worn the entire time you are exposed to the sun's rays.
I will START or STAY ON the medications listed below when I get home from the hospital:    aspirin 325 mg oral tablet  -- 1 tab(s) by mouth once a day  -- Indication: For Tia    atorvastatin 10 mg oral tablet  -- 1 tab(s) by mouth once a day (at bedtime)  -- Indication: For TIA

## 2021-11-22 ENCOUNTER — OUTPATIENT (OUTPATIENT)
Dept: OUTPATIENT SERVICES | Facility: HOSPITAL | Age: 76
LOS: 1 days | End: 2021-11-22
Payer: COMMERCIAL

## 2021-11-22 ENCOUNTER — APPOINTMENT (OUTPATIENT)
Dept: MAMMOGRAPHY | Facility: CLINIC | Age: 76
End: 2021-11-22
Payer: MEDICARE

## 2021-11-22 ENCOUNTER — APPOINTMENT (OUTPATIENT)
Dept: RADIOLOGY | Facility: CLINIC | Age: 76
End: 2021-11-22
Payer: MEDICARE

## 2021-11-22 DIAGNOSIS — Z00.8 ENCOUNTER FOR OTHER GENERAL EXAMINATION: ICD-10-CM

## 2021-11-22 PROCEDURE — 77067 SCR MAMMO BI INCL CAD: CPT

## 2021-11-22 PROCEDURE — 71046 X-RAY EXAM CHEST 2 VIEWS: CPT

## 2021-11-22 PROCEDURE — 77063 BREAST TOMOSYNTHESIS BI: CPT

## 2021-11-22 PROCEDURE — 71046 X-RAY EXAM CHEST 2 VIEWS: CPT | Mod: 26

## 2021-11-22 PROCEDURE — 77067 SCR MAMMO BI INCL CAD: CPT | Mod: 26

## 2021-11-22 PROCEDURE — 77080 DXA BONE DENSITY AXIAL: CPT

## 2021-11-22 PROCEDURE — 77080 DXA BONE DENSITY AXIAL: CPT | Mod: 26

## 2021-11-22 PROCEDURE — 77063 BREAST TOMOSYNTHESIS BI: CPT | Mod: 26

## 2022-02-01 NOTE — DISCHARGE NOTE ADULT - NS AS DC STROKE DX YN
- sodium down to 133 post operatively, on isolyte at 50cc/hr  - IVF stopped this am, monitor sodium  - AM labs pending yes

## 2022-09-12 ENCOUNTER — NON-APPOINTMENT (OUTPATIENT)
Age: 77
End: 2022-09-12

## 2022-11-10 ENCOUNTER — NON-APPOINTMENT (OUTPATIENT)
Age: 77
End: 2022-11-10

## 2022-11-11 ENCOUNTER — TRANSCRIPTION ENCOUNTER (OUTPATIENT)
Age: 77
End: 2022-11-11

## 2022-11-11 ENCOUNTER — EMERGENCY (EMERGENCY)
Facility: HOSPITAL | Age: 77
LOS: 1 days | Discharge: ANOTHER HOSPITAL | End: 2022-11-11
Attending: EMERGENCY MEDICINE | Admitting: EMERGENCY MEDICINE
Payer: COMMERCIAL

## 2022-11-11 ENCOUNTER — APPOINTMENT (OUTPATIENT)
Dept: NEUROLOGY | Facility: HOSPITAL | Age: 77
End: 2022-11-11

## 2022-11-11 ENCOUNTER — INPATIENT (INPATIENT)
Facility: HOSPITAL | Age: 77
LOS: 2 days | Discharge: HOME | DRG: 24 | End: 2022-11-14
Attending: INTERNAL MEDICINE | Admitting: INTERNAL MEDICINE
Payer: COMMERCIAL

## 2022-11-11 VITALS
TEMPERATURE: 98 F | WEIGHT: 107.59 LBS | RESPIRATION RATE: 18 BRPM | DIASTOLIC BLOOD PRESSURE: 77 MMHG | HEART RATE: 68 BPM | SYSTOLIC BLOOD PRESSURE: 159 MMHG | OXYGEN SATURATION: 100 %

## 2022-11-11 VITALS
DIASTOLIC BLOOD PRESSURE: 74 MMHG | OXYGEN SATURATION: 100 % | RESPIRATION RATE: 18 BRPM | HEART RATE: 79 BPM | SYSTOLIC BLOOD PRESSURE: 137 MMHG

## 2022-11-11 VITALS
DIASTOLIC BLOOD PRESSURE: 86 MMHG | OXYGEN SATURATION: 95 % | RESPIRATION RATE: 12 BRPM | TEMPERATURE: 97 F | SYSTOLIC BLOOD PRESSURE: 120 MMHG | HEART RATE: 78 BPM

## 2022-11-11 DIAGNOSIS — I69.30 UNSPECIFIED SEQUELAE OF CEREBRAL INFARCTION: ICD-10-CM

## 2022-11-11 LAB
A1C WITH ESTIMATED AVERAGE GLUCOSE RESULT: 5.7 % — HIGH (ref 4–5.6)
ALBUMIN SERPL ELPH-MCNC: 3.4 G/DL — SIGNIFICANT CHANGE UP (ref 3.3–5)
ALP SERPL-CCNC: 87 U/L — SIGNIFICANT CHANGE UP (ref 30–120)
ALT FLD-CCNC: 14 U/L DA — SIGNIFICANT CHANGE UP (ref 10–60)
ANION GAP SERPL CALC-SCNC: 11 MMOL/L — SIGNIFICANT CHANGE UP (ref 5–17)
ANION GAP SERPL CALC-SCNC: 5 MMOL/L — SIGNIFICANT CHANGE UP (ref 5–17)
APTT BLD: 35.8 SEC — HIGH (ref 27.5–35.5)
APTT BLD: 36.7 SEC — HIGH (ref 27.5–35.5)
AST SERPL-CCNC: 26 U/L — SIGNIFICANT CHANGE UP (ref 10–40)
BASOPHILS # BLD AUTO: 0.02 K/UL — SIGNIFICANT CHANGE UP (ref 0–0.2)
BASOPHILS NFR BLD AUTO: 0.3 % — SIGNIFICANT CHANGE UP (ref 0–2)
BILIRUB SERPL-MCNC: 0.7 MG/DL — SIGNIFICANT CHANGE UP (ref 0.2–1.2)
BUN SERPL-MCNC: 20.8 MG/DL — HIGH (ref 8–20)
BUN SERPL-MCNC: 27 MG/DL — HIGH (ref 7–23)
CALCIUM SERPL-MCNC: 8.5 MG/DL — SIGNIFICANT CHANGE UP (ref 8.4–10.5)
CALCIUM SERPL-MCNC: 9 MG/DL — SIGNIFICANT CHANGE UP (ref 8.4–10.5)
CHLORIDE SERPL-SCNC: 106 MMOL/L — SIGNIFICANT CHANGE UP (ref 96–108)
CHLORIDE SERPL-SCNC: 109 MMOL/L — HIGH (ref 96–108)
CHOLEST SERPL-MCNC: 155 MG/DL — SIGNIFICANT CHANGE UP
CK SERPL-CCNC: 80 U/L — SIGNIFICANT CHANGE UP (ref 25–170)
CO2 SERPL-SCNC: 21 MMOL/L — LOW (ref 22–29)
CO2 SERPL-SCNC: 28 MMOL/L — SIGNIFICANT CHANGE UP (ref 22–31)
CREAT SERPL-MCNC: 0.86 MG/DL — SIGNIFICANT CHANGE UP (ref 0.5–1.3)
CREAT SERPL-MCNC: 1.12 MG/DL — SIGNIFICANT CHANGE UP (ref 0.5–1.3)
EGFR: 50 ML/MIN/1.73M2 — LOW
EGFR: 69 ML/MIN/1.73M2 — SIGNIFICANT CHANGE UP
EOSINOPHIL # BLD AUTO: 0.12 K/UL — SIGNIFICANT CHANGE UP (ref 0–0.5)
EOSINOPHIL NFR BLD AUTO: 1.9 % — SIGNIFICANT CHANGE UP (ref 0–6)
ESTIMATED AVERAGE GLUCOSE: 117 MG/DL — HIGH (ref 68–114)
FLUAV AG NPH QL: SIGNIFICANT CHANGE UP
FLUBV AG NPH QL: SIGNIFICANT CHANGE UP
GLUCOSE SERPL-MCNC: 110 MG/DL — HIGH (ref 70–99)
GLUCOSE SERPL-MCNC: 184 MG/DL — HIGH (ref 70–99)
HCT VFR BLD CALC: 37.5 % — SIGNIFICANT CHANGE UP (ref 34.5–45)
HCT VFR BLD CALC: 42 % — SIGNIFICANT CHANGE UP (ref 34.5–45)
HDLC SERPL-MCNC: 79 MG/DL — SIGNIFICANT CHANGE UP
HGB BLD-MCNC: 12.3 G/DL — SIGNIFICANT CHANGE UP (ref 11.5–15.5)
HGB BLD-MCNC: 13.7 G/DL — SIGNIFICANT CHANGE UP (ref 11.5–15.5)
IMM GRANULOCYTES NFR BLD AUTO: 0.6 % — SIGNIFICANT CHANGE UP (ref 0–0.9)
INR BLD: 1.55 RATIO — HIGH (ref 0.88–1.16)
INR BLD: 2 RATIO — HIGH (ref 0.88–1.16)
LIPID PNL WITH DIRECT LDL SERPL: 60 MG/DL — SIGNIFICANT CHANGE UP
LYMPHOCYTES # BLD AUTO: 1.06 K/UL — SIGNIFICANT CHANGE UP (ref 1–3.3)
LYMPHOCYTES # BLD AUTO: 16.6 % — SIGNIFICANT CHANGE UP (ref 13–44)
MAGNESIUM SERPL-MCNC: 1.8 MG/DL — SIGNIFICANT CHANGE UP (ref 1.8–2.6)
MCHC RBC-ENTMCNC: 29.4 PG — SIGNIFICANT CHANGE UP (ref 27–34)
MCHC RBC-ENTMCNC: 29.5 PG — SIGNIFICANT CHANGE UP (ref 27–34)
MCHC RBC-ENTMCNC: 32.6 GM/DL — SIGNIFICANT CHANGE UP (ref 32–36)
MCHC RBC-ENTMCNC: 32.8 GM/DL — SIGNIFICANT CHANGE UP (ref 32–36)
MCV RBC AUTO: 89.5 FL — SIGNIFICANT CHANGE UP (ref 80–100)
MCV RBC AUTO: 90.3 FL — SIGNIFICANT CHANGE UP (ref 80–100)
MONOCYTES # BLD AUTO: 0.4 K/UL — SIGNIFICANT CHANGE UP (ref 0–0.9)
MONOCYTES NFR BLD AUTO: 6.3 % — SIGNIFICANT CHANGE UP (ref 2–14)
NEUTROPHILS # BLD AUTO: 4.75 K/UL — SIGNIFICANT CHANGE UP (ref 1.8–7.4)
NEUTROPHILS NFR BLD AUTO: 74.3 % — SIGNIFICANT CHANGE UP (ref 43–77)
NON HDL CHOLESTEROL: 76 MG/DL — SIGNIFICANT CHANGE UP
NRBC # BLD: 0 /100 WBCS — SIGNIFICANT CHANGE UP (ref 0–0)
PHOSPHATE SERPL-MCNC: 3.6 MG/DL — SIGNIFICANT CHANGE UP (ref 2.4–4.7)
PLATELET # BLD AUTO: 248 K/UL — SIGNIFICANT CHANGE UP (ref 150–400)
PLATELET # BLD AUTO: 286 K/UL — SIGNIFICANT CHANGE UP (ref 150–400)
POTASSIUM SERPL-MCNC: 4 MMOL/L — SIGNIFICANT CHANGE UP (ref 3.5–5.3)
POTASSIUM SERPL-MCNC: 4 MMOL/L — SIGNIFICANT CHANGE UP (ref 3.5–5.3)
POTASSIUM SERPL-SCNC: 4 MMOL/L — SIGNIFICANT CHANGE UP (ref 3.5–5.3)
POTASSIUM SERPL-SCNC: 4 MMOL/L — SIGNIFICANT CHANGE UP (ref 3.5–5.3)
PROT SERPL-MCNC: 7.1 G/DL — SIGNIFICANT CHANGE UP (ref 6–8.3)
PROTHROM AB SERPL-ACNC: 17.9 SEC — HIGH (ref 10.5–13.4)
PROTHROM AB SERPL-ACNC: 23.4 SEC — HIGH (ref 10.5–13.4)
RBC # BLD: 4.19 M/UL — SIGNIFICANT CHANGE UP (ref 3.8–5.2)
RBC # BLD: 4.65 M/UL — SIGNIFICANT CHANGE UP (ref 3.8–5.2)
RBC # FLD: 14.3 % — SIGNIFICANT CHANGE UP (ref 10.3–14.5)
RBC # FLD: 14.4 % — SIGNIFICANT CHANGE UP (ref 10.3–14.5)
RSV RNA NPH QL NAA+NON-PROBE: SIGNIFICANT CHANGE UP
SARS-COV-2 RNA SPEC QL NAA+PROBE: SIGNIFICANT CHANGE UP
SODIUM SERPL-SCNC: 139 MMOL/L — SIGNIFICANT CHANGE UP (ref 135–145)
SODIUM SERPL-SCNC: 140 MMOL/L — SIGNIFICANT CHANGE UP (ref 135–145)
TRIGL SERPL-MCNC: 83 MG/DL — SIGNIFICANT CHANGE UP
TROPONIN I, HIGH SENSITIVITY RESULT: 22.9 NG/L — SIGNIFICANT CHANGE UP
TROPONIN T SERPL-MCNC: <0.01 NG/ML — SIGNIFICANT CHANGE UP (ref 0–0.06)
WBC # BLD: 5.04 K/UL — SIGNIFICANT CHANGE UP (ref 3.8–10.5)
WBC # BLD: 6.39 K/UL — SIGNIFICANT CHANGE UP (ref 3.8–10.5)
WBC # FLD AUTO: 5.04 K/UL — SIGNIFICANT CHANGE UP (ref 3.8–10.5)
WBC # FLD AUTO: 6.39 K/UL — SIGNIFICANT CHANGE UP (ref 3.8–10.5)

## 2022-11-11 PROCEDURE — 93010 ELECTROCARDIOGRAM REPORT: CPT

## 2022-11-11 PROCEDURE — 71045 X-RAY EXAM CHEST 1 VIEW: CPT | Mod: 26

## 2022-11-11 PROCEDURE — 85610 PROTHROMBIN TIME: CPT

## 2022-11-11 PROCEDURE — 87637 SARSCOV2&INF A&B&RSV AMP PRB: CPT

## 2022-11-11 PROCEDURE — 70450 CT HEAD/BRAIN W/O DYE: CPT | Mod: MA

## 2022-11-11 PROCEDURE — 85025 COMPLETE CBC W/AUTO DIFF WBC: CPT

## 2022-11-11 PROCEDURE — 70498 CT ANGIOGRAPHY NECK: CPT | Mod: MA

## 2022-11-11 PROCEDURE — 84484 ASSAY OF TROPONIN QUANT: CPT

## 2022-11-11 PROCEDURE — 80053 COMPREHEN METABOLIC PANEL: CPT

## 2022-11-11 PROCEDURE — 99291 CRITICAL CARE FIRST HOUR: CPT | Mod: 25

## 2022-11-11 PROCEDURE — 61645 PERQ ART M-THROMBECT &/NFS: CPT | Mod: RT

## 2022-11-11 PROCEDURE — 99291 CRITICAL CARE FIRST HOUR: CPT

## 2022-11-11 PROCEDURE — 82962 GLUCOSE BLOOD TEST: CPT

## 2022-11-11 PROCEDURE — 70496 CT ANGIOGRAPHY HEAD: CPT | Mod: 26,MA

## 2022-11-11 PROCEDURE — 93306 TTE W/DOPPLER COMPLETE: CPT | Mod: 26

## 2022-11-11 PROCEDURE — 71045 X-RAY EXAM CHEST 1 VIEW: CPT

## 2022-11-11 PROCEDURE — 80061 LIPID PANEL: CPT

## 2022-11-11 PROCEDURE — 99222 1ST HOSP IP/OBS MODERATE 55: CPT

## 2022-11-11 PROCEDURE — 70496 CT ANGIOGRAPHY HEAD: CPT | Mod: MA

## 2022-11-11 PROCEDURE — 36415 COLL VENOUS BLD VENIPUNCTURE: CPT

## 2022-11-11 PROCEDURE — 70450 CT HEAD/BRAIN W/O DYE: CPT | Mod: 26

## 2022-11-11 PROCEDURE — 85730 THROMBOPLASTIN TIME PARTIAL: CPT

## 2022-11-11 PROCEDURE — 70498 CT ANGIOGRAPHY NECK: CPT | Mod: 26,MA,59

## 2022-11-11 PROCEDURE — 70450 CT HEAD/BRAIN W/O DYE: CPT | Mod: 26,59,MA

## 2022-11-11 PROCEDURE — 93005 ELECTROCARDIOGRAM TRACING: CPT

## 2022-11-11 PROCEDURE — 70460 CT HEAD/BRAIN W/DYE: CPT | Mod: 26

## 2022-11-11 RX ORDER — ASPIRIN/CALCIUM CARB/MAGNESIUM 324 MG
300 TABLET ORAL ONCE
Refills: 0 | Status: COMPLETED | OUTPATIENT
Start: 2022-11-11 | End: 2022-11-11

## 2022-11-11 RX ORDER — ATORVASTATIN CALCIUM 80 MG/1
80 TABLET, FILM COATED ORAL AT BEDTIME
Refills: 0 | Status: DISCONTINUED | OUTPATIENT
Start: 2022-11-11 | End: 2022-11-12

## 2022-11-11 RX ORDER — SODIUM CHLORIDE 9 MG/ML
1000 INJECTION INTRAMUSCULAR; INTRAVENOUS; SUBCUTANEOUS
Refills: 0 | Status: DISCONTINUED | OUTPATIENT
Start: 2022-11-11 | End: 2022-11-12

## 2022-11-11 RX ORDER — SODIUM CHLORIDE 9 MG/ML
500 INJECTION INTRAMUSCULAR; INTRAVENOUS; SUBCUTANEOUS ONCE
Refills: 0 | Status: COMPLETED | OUTPATIENT
Start: 2022-11-11 | End: 2022-11-11

## 2022-11-11 RX ADMIN — ATORVASTATIN CALCIUM 80 MILLIGRAM(S): 80 TABLET, FILM COATED ORAL at 21:52

## 2022-11-11 RX ADMIN — SODIUM CHLORIDE 50 MILLILITER(S): 9 INJECTION INTRAMUSCULAR; INTRAVENOUS; SUBCUTANEOUS at 19:31

## 2022-11-11 RX ADMIN — SODIUM CHLORIDE 500 MILLILITER(S): 9 INJECTION INTRAMUSCULAR; INTRAVENOUS; SUBCUTANEOUS at 18:00

## 2022-11-11 RX ADMIN — SODIUM CHLORIDE 50 MILLILITER(S): 9 INJECTION INTRAMUSCULAR; INTRAVENOUS; SUBCUTANEOUS at 17:56

## 2022-11-11 NOTE — ED ADULT NURSE NOTE - NS TRANSFER EYEGLASSES PAIRS
----- Message from Farida Rey MD sent at 4/7/2022  4:59 PM CDT -----  Probably benign hypoechoic masses of the left breast favored to be  complicated cysts.  2. Probably benign left breast asymmetry on mammography ML and MLO  projection     RECOMMENDATIONS: Left diagnostic mammography and left breast ultrasound in  6 months time to establish stability.     BI-RADS: 3 - Probably Benign Finding - Short Interval Follow-up Suggested.   
Patient notified and repeat imaging ordered.   
1 pair

## 2022-11-11 NOTE — ED PROVIDER NOTE - CRITICAL CARE ATTENDING CONTRIBUTION TO CARE
Laura MONTERO DO, have personally provided 45 minutes of critical care time exclusive of time spent on separately billable procedures. Time includes review of laboratory data, radiology results, discussion with consultants, and monitoring for potential decompensation. Interventions were performed as documented above.

## 2022-11-11 NOTE — ED PROVIDER NOTE - CLINICAL SUMMARY MEDICAL DECISION MAKING FREE TEXT BOX
Patient is a 77-year-old female presents to the emergency room with a chief complaint of possible CVA.  Past medical history is limited patient reports questionable irregular heart.  For which she is on Coumadin every other day.  She last took Coumadin yesterday evening.  She reports that she is on no additional medications.  Her family does report that she has pacemaker although there is unclear why she has this.  Of further note it appears that in 2017 patient suffered a TIA with no residual deficits.  Per history patient went to bed around 10 PM last night and was in her normal state of health.  She awoke at around 730 this morning and when she tried to get up from bed she fell to the ground due to left-sided weakness.  Patient reports pain into her low back and left hip since and states she was unable to get up.  She also reports a headache with dizziness although when questioned about the dizziness patient cannot quantify this.  She reports that she has been experiencing the dizziness for the last 2 weeks.  Denies visual changes nausea vomiting chest pain shortness of breath abdominal pain extremity numbness or weakness. Patient presenting to the emergency room for signs and symptoms concerning for possible CVA.  As her last known well was 10 PM the night prior she is outside of the tPA window but still would be a thrombectomy candidate.  Will obtain screening labs neuro stroke imaging and will monitor.  Patient's NIH at the bedside was an 8 CTA imaging did reveal thrombus in the right distal ICA and right M1 A1 segments.  Patient updated transfer center contacted imaging reviewed by telestroke and neuro intervention.  The decision was then made to transfer patient to [place] for further work-up and possible intervention.  Aspirin ordered rectally per transfer request.  Blood pressure remained stable at this time patient appears to be in a flutter although rate controlled.  No further deficits noted at this time.

## 2022-11-11 NOTE — ED ADULT TRIAGE NOTE - CHIEF COMPLAINT QUOTE
As per son, pt woke up at 7:30 am with left sided facial droop, with weakness on left arm, pt non verbal on arrival, went to bed at 2200 without issues  Son drove pt to the hospital

## 2022-11-11 NOTE — ED ADULT NURSE NOTE - NSIMPLEMENTINTERV_GEN_ALL_ED
Implemented All Fall with Harm Risk Interventions:  [place] to call system. Call bell, personal items and telephone within reach. Instruct patient to call for assistance. Room bathroom lighting operational. Non-slip footwear when patient is off stretcher. Physically safe environment: no spills, clutter or unnecessary equipment. Stretcher in lowest position, wheels locked, appropriate side rails in place. Provide visual cue, wrist band, yellow gown, etc. Monitor gait and stability. Monitor for mental status changes and reorient to person, place, and time. Review medications for side effects contributing to fall risk. Reinforce activity limits and safety measures with patient and family. Provide visual clues: red socks.

## 2022-11-11 NOTE — ED PROVIDER NOTE - CLINICAL SUMMARY MEDICAL DECISION MAKING FREE TEXT BOX
78 y/o F presents as Code Stroke from [facility] 10 pm after much discussion with patient's  with Mandarin  - patient had imaging at [facility] which showed right M1 occlusion - patient to go for biplane with Dr. Ortega.

## 2022-11-11 NOTE — ED PROVIDER NOTE - PHYSICAL EXAMINATION
Gen: Well appearing in NAD  Head: NC/AT  Neck: trachea midline  Resp:  No distress  Ext: no deformities  Neuro: NIH 0.  Skin:  Warm and dry as visualized  Psych:  Normal affect and mood

## 2022-11-11 NOTE — CONSULT NOTE ADULT - SUBJECTIVE AND OBJECTIVE BOX
Preliminary note, official recommendations pending attending signature/review     HPI:  78 y/o F presents as transfer from [place] as Code Stroke and possible biplane candidate. She was last known well at 10 pm last night, fell in bathroom after walking in herself, at which point  noticed left sided weakness. She was taken to [facility] where CT demonstrated a hyper-dense right mCA - patient's INR was subtherapeutic, not a tPA candidate reported last seen well 10pm 11/10/22. Per  with Mandarin , the patient fell in the bathroom at 730am this morning, went to the bathroom twice last night, but he was asleep and wasn't sure if she was normal during that time or not.      NIH 11    NIH SS:  DATE:11/11/22  TIME: 1000  1A: Level of consciousness (0-3): 0  1B: Questions (0-2):  0  1C: Commands (0-2): 0   2: Gaze (0-2): 1  3: Visual fields (0-3): 1   4: Facial palsy (0-3): 2  MOTOR:  5A: Left arm motor drift (0-4): 2  5B: Right arm motor drift (0-4):0   6A: Left leg motor drift (0-4): 1  6B: Right leg motor drift (0-4):0   7: Limb ataxia (0-2): 0  SENSORY:  8: Sensation (0-2): 1  SPEECH:  9: Language (0-3): 0  10: Dysarthria (0-2): 1   EXTINCTION:  11: Extinction/inattention (0-2): 2    TOTAL SCORE:   Neuro Exam: Awake and alert, oriented x3, fluent, normal naming and repetition, follows simple  commands. left gaze palsy, attends to left and able to count fingers but  visual extinction and possible LHH , no nystagmus, left facial palsy, tongue midline.  left sensory extinction, left arm falls to bed in < 5 sec, left leg drift       SUBJECTIVE: No events overnight.  No new neurologic complaints.  ROS reported negative unless otherwise noted.      LABS:       see sunrise       IMAGING: Reviewed by me.     CT Head 11/11/22:  Intravascular contrast present on the study from prior CTA.  Contrast is noted within the previously occluded right MCA M1 segment,   suggesting interval resolution of thrombosis.  No acute transcortical infarct or gross intracranial hemorrhage   identified.    CT angiography neck: No hemodynamically significant stenosis of the   bilateral cervical ICAs using NASCET criteria.  Patent vertebral   arteries.  No evidence of vascular dissection.    CT angiography brain: Occlusion of the distal right supraclinoid ICA and   right proximal M1 and A1 segments. Separately, there is occlusion of a   right distal M1/proximal M2 branch.           HPI:  78 y/o F presents as transfer from [place] as Code Stroke and possible biplane candidate. She was last known well at 10 pm last night, fell in bathroom after walking in herself, at which point  noticed left sided weakness. She was taken to [facility] where CT demonstrated a hyper-dense right mCA - patient's INR was subtherapeutic, not a tPA candidate reported last seen well 10pm 11/10/22. Per  with Mandarin , the patient fell in the bathroom at 730am this morning, went to the bathroom twice last night, but he was asleep and wasn't sure if she was normal during that time or not.      [facility] 11    NIH SS:  DATE:11/11/22  TIME: 1000  1A: Level of consciousness (0-3): 0  1B: Questions (0-2):  0  1C: Commands (0-2): 0   2: Gaze (0-2): 1  3: Visual fields (0-3): 1   4: Facial palsy (0-3): 2  MOTOR:  5A: Left arm motor drift (0-4): 2  5B: Right arm motor drift (0-4):0   6A: Left leg motor drift (0-4): 1  6B: Right leg motor drift (0-4):0   7: Limb ataxia (0-2): 0  SENSORY:  8: Sensation (0-2): 1  SPEECH:  9: Language (0-3): 0  10: Dysarthria (0-2): 1   EXTINCTION:  11: Extinction/inattention (0-2): 2    TOTAL SCORE:   Neuro Exam: Awake and alert, oriented x3, fluent, normal naming and repetition, follows simple  commands. left gaze palsy, attends to left and able to count fingers but  visual extinction and possible LHH , no nystagmus, left facial palsy, tongue midline.  left sensory extinction, left arm falls to bed in < 5 sec, left leg drift       SUBJECTIVE: No events overnight.  No new neurologic complaints.  ROS reported negative unless otherwise noted.      LABS:       see sunrise       IMAGING: Reviewed by me.     CT Head 11/11/22:  Intravascular contrast present on the study from prior CTA.  Contrast is noted within the previously occluded right MCA M1 segment,   suggesting interval resolution of thrombosis.  No acute transcortical infarct or gross intracranial hemorrhage   identified.    CT angiography neck: No hemodynamically significant stenosis of the   bilateral cervical ICAs using NASCET criteria.  Patent vertebral   arteries.  No evidence of vascular dissection.    CT angiography brain: Occlusion of the distal right supraclinoid ICA and   right proximal M1 and A1 segments. Separately, there is occlusion of a   right distal M1/proximal M2 branch.

## 2022-11-11 NOTE — CHART NOTE - NSCHARTNOTEFT_GEN_A_CORE
Interventional Neuro Radiology  Pre-Procedure Note    [id number]  HPI:  78 y/o F presents as transfer from [place] as Code Stroke and possible biplane candidate. She was last known well at 10 pm last night, fell in bathroom after walking in herself, at which point  noticed left sided weakness. She was taken to [facility] where CT demonstrated a hyper-dense right mCA - patient's INR was subtherapeutic, not a tPA candidate reported last seen well 10pm 11/10/22. Per  with Mandarin , the patient fell in the bathroom at 730am this morning, went to the bathroom twice last night, but he was asleep and wasn't sure if she was normal during that time or not.      NIH 11  Neuro Exam: Awake and alert, oriented x3, fluent, normal naming and repetition, follows simple  commands. left gaze palsy, attends to left and able to count fingers but  visual extinction and possible LHH , no nystagmus, left facial palsy, tongue midline.  left sensory extinction, left arm falls to bed in < 5 sec, left leg drift       Allergies:  denies     Current Medications: coumadin     Labs: see [facility]       Blood Bank:  pending     Assessment/Plan:   This is a 77y /o women who presented the morning of 11/11/22 with left sided weakness, she is on coumadin at home for a irregular heart beat. Her INR is subtherapeutic, not a TPA candidate due to LKW 10-1030pm on 11/10/22. CTH without evidence of hemorrhage, CTA Head/Neck demonstrated   Patient presents to neuro-IR for selective cerebral angiography/mechanical thrombectomy.    Procedure/ risks/ benefits/ goals/ alternatives were explained. Risks include but are not limited to stroke/ vessel injury/ hemorrhage/ groin hematoma. All questions answered and concerns addressed. Informed content obtained from patient who verbalizes /expresses full understanding. Consent placed in chart. Interventional Neuro Radiology  Pre-Procedure Note    [id number]  HPI:  78 y/o F presents as transfer from [place] as Code Stroke and possible biplane candidate. She was last known well at 10 pm last night, fell in bathroom after walking in herself, at which point  noticed left sided weakness. She was taken to [facility] where CT demonstrated a hyper-dense right mCA - patient's INR was subtherapeutic, not a tPA candidate reported last seen well 10pm 11/10/22. Per  with Mandarin , the patient fell in the bathroom at 730am this morning, went to the bathroom twice last night, but he was asleep and wasn't sure if she was normal during that time or not.      NIH 11  Neuro Exam: Awake and alert, oriented x3, fluent, normal naming and repetition, follows simple  commands. left gaze palsy, attends to left and able to count fingers but  visual extinction and possible LHH , no nystagmus, left facial palsy, tongue midline.  left sensory extinction, left arm falls to bed in < 5 sec, left leg drift       Allergies:  denies     Current Medications: coumadin     Labs: see [facility]       Blood Bank:  pending     Assessment/Plan:   This is a 77y /o women who presented the morning of 11/11/22 with left sided weakness, she is on coumadin at home for a irregular heart beat. Her INR is subtherapeutic, not a TPA candidate due to LKW 10-1030pm on 11/10/22. CTH without evidence of hemorrhage, CTA Head/Neck demonstrated Occlusion of the distal right supraclinoid ICA and right proximal M1 and A1 segments. Separately, there is occlusion of a  right distal M1/proximal M2 branch.    Patient presents to neuro-IR for selective cerebral angiography/mechanical thrombectomy.    Procedure/ risks/ benefits/ goals/ alternatives were explained. Risks include but are not limited to stroke/ vessel injury/ hemorrhage/ groin hematoma. All questions answered and concerns addressed. 2 PC emergent consent in charge. Condition and plan of care d/w .  ID [id number] Interventional Neuro Radiology  Pre-Procedure Note    [id number]  HPI:  76 y/o F presents as transfer from [place] as Code Stroke and possible biplane candidate. She was last known well at 10 pm last night, fell in bathroom after walking in herself, at which point  noticed left sided weakness. She was taken to [facility] where CT demonstrated a hyper-dense right mCA - patient's INR was subtherapeutic, not a tPA candidate reported last seen well 10pm 11/10/22. Per  with Mandarin , the patient fell in the bathroom at 730am this morning, went to the bathroom twice last night, but he was asleep and wasn't sure if she was normal during that time or not.      NIH 11  Neuro Exam: Awake and alert, oriented x3, fluent, normal naming and repetition, follows simple  commands. left gaze palsy, attends to left and able to count fingers but  visual extinction and possible LHH , no nystagmus, left facial palsy, tongue midline.  left sensory extinction, left arm falls to bed in < 5 sec, left leg drift       Allergies:  denies     Current Medications: coumadin     Labs: see [facility]       Blood Bank:  pending     Assessment/Plan:   This is a 77y /o women who presented the morning of 11/11/22 with left sided weakness, she is on coumadin at home for a irregular heart beat. Her INR is subtherapeutic, not a TPA candidate due to LKW 10-1030pm on 11/10/22. CTH without evidence of hemorrhage, CTA Head/Neck demonstrated Occlusion of the distal right supraclinoid ICA and right proximal M1 and A1 segments. Separately, there is occlusion of a  right distal M1/proximal M2 branch.    Patient presents to neuro-IR for selective cerebral angiography/mechanical thrombectomy.    Procedure/ risks/ benefits/ goals/ alternatives were explained. Risks include but are not limited to stroke/ vessel injury/ hemorrhage/ groin hematoma. All questions answered and concerns addressed. 2 PC emergent consent in charge. Condition and plan of care d/w .  ID [id number]  Patient was seen and examined by Dr Tyler Ortega and  me. History and exam as documented above by PA/NP was confirmed by me.   Agree with plan as outlined above. Interventional Neuro Radiology  Pre-Procedure Note    [id number]  HPI:  78 y/o F presents as transfer from [place] as Code Stroke and possible biplane candidate. She was last known well at 10 pm last night, fell in bathroom after walking in herself, at which point  noticed left sided weakness. She was taken to [facility] where CT demonstrated a hyper-dense right mCA - patient's INR was subtherapeutic, not a tPA candidate reported last seen well 10pm 11/10/22. Per  with Mandarin , the patient fell in the bathroom at 730am this morning, went to the bathroom twice last night, but he was asleep and wasn't sure if she was normal during that time or not.      NIH 11  Neuro Exam: Awake and alert, oriented x3, fluent, normal naming and repetition, follows simple  commands. left gaze palsy, attends to left and able to count fingers but  visual extinction and possible LHH , no nystagmus, left facial palsy, tongue midline.  left sensory extinction, left arm falls to bed in < 5 sec, left leg drift       Allergies:  denies     Current Medications: coumadin     Labs: see [facility]       Blood Bank:  pending     Assessment/Plan:   This is a 77y /o women who presented the morning of 11/11/22 with left sided weakness, she is on coumadin at home for a irregular heart beat. Her INR is subtherapeutic, not a TPA candidate due to LKW 10-1030pm on 11/10/22. CTH without evidence of hemorrhage, CTA Head/Neck demonstrated Occlusion of the distal right supraclinoid ICA and right proximal M1 and A1 segments. Separately, there is occlusion of a  right distal M1/proximal M2 branch.    Patient presents to neuro-IR for selective cerebral angiography/mechanical thrombectomy.    Procedure/ risks/ benefits/ goals/ alternatives were explained. Risks include but are not limited to stroke/ vessel injury/ hemorrhage/ groin hematoma. All questions answered and concerns addressed. 2 PC emergent consent in charge. Condition and plan of care d/w .  ID [id number]  Patient was seen and examined by Dr Tyler Ortega and  me. History and exam as documented above by PA/NP was confirmed by me.   Agree with plan as outlined above.      Given the emergent nature of the stroke, the high potential for fatal or severely disabling stroke if not intervened emergently, 2 physician consent was obtained as noted.   An attempt to secure express consent would result in delay of treatment. We proceeded emergently to thrombectomy.       Full H&P to follow given emergent procedure. Interventional Neuro Radiology  Pre-Procedure Note    [id number]  HPI:  78 y/o F presents as transfer from [place] as Code Stroke and possible biplane candidate. She was last known well at 10 pm last night, fell in bathroom after walking in herself, at which point  noticed left sided weakness. She was taken to [facility] where CT demonstrated a hyper-dense right mCA - patient's INR was subtherapeutic, not a tPA candidate reported last seen well 10pm 11/10/22. Per  with Mandarin , the patient fell in the bathroom at 730am this morning, went to the bathroom twice last night, but he was asleep and wasn't sure if she was normal during that time or not.      NIH 11  Neuro Exam: Awake and alert, oriented x3, fluent, normal naming and repetition, follows simple  commands. left gaze palsy, attends to left and able to count fingers but  visual extinction and possible LHH , no nystagmus, left facial palsy, tongue midline.  left sensory extinction, left arm falls to bed in < 5 sec, left leg drift   Premorbid MRS: 0    Allergies:  denies     Current Medications: coumadin     Labs: see [facility]       Blood Bank:  pending     Assessment/Plan:   This is a 77y /o women who presented the morning of 11/11/22 with left sided weakness, she is on coumadin at home for a irregular heart beat. Her INR is subtherapeutic, not a TPA candidate due to LKW 10-1030pm on 11/10/22. CTH without evidence of hemorrhage, CTA Head/Neck demonstrated Occlusion of the distal right supraclinoid ICA and right proximal M1 and A1 segments. Separately, there is occlusion of a  right distal M1/proximal M2 branch.    Patient presents to neuro-IR for selective cerebral angiography/mechanical thrombectomy.    Procedure/ risks/ benefits/ goals/ alternatives were explained. Risks include but are not limited to stroke/ vessel injury/ hemorrhage/ groin hematoma. All questions answered and concerns addressed. 2 PC emergent consent in charge. Condition and plan of care d/w .  ID [id number]  Patient was seen and examined by Dr Tyler Ortega and  me. History and exam as documented above by PA/NP was confirmed by me.   Agree with plan as outlined above.      Given the emergent nature of the stroke, the high potential for fatal or severely disabling stroke if not intervened emergently, 2 physician consent was obtained as noted.   An attempt to secure express consent would result in delay of treatment. We proceeded emergently to thrombectomy.       Full H&P to follow given emergent procedure.

## 2022-11-11 NOTE — CHART NOTE - NSCHARTNOTEFT_GEN_A_CORE
TWO PHYSICIAN CONSENT    Patient needs an emergent cerebral angiogram and thrombectomy.  No immediate family available for consent.  Hence procedure will be done with 2 physician consent    Katherine Castañeda MD  Stroke Neurology attending. TWO PHYSICIAN CONSENT    Patient needs an emergent cerebral angiogram and thrombectomy.  No immediate family available for consent.  Hence procedure will be done with 2 physician consent    Katherine Castañeda MD  Stroke Neurology attending.    Patient needs an emergent cerebral angiogram and thrombectomy.  No immediate family available for consent.  Hence procedure will be done with 2 physician consent    Laura Dozier DO  Emergency Medicine attending. TWO PHYSICIAN CONSENT    Patient needs an emergent cerebral angiogram and thrombectomy.  No immediate family available for consent.  Hence procedure will be done with 2 physician consent    Katherine Castañeda MD.  Stroke Neurology attending.    Patient needs an emergent cerebral angiogram and thrombectomy.  No immediate family available for consent.  Hence procedure will be done with 2 physician consent    Laura Dozier DO  Emergency Medicine attending.

## 2022-11-11 NOTE — ED PROVIDER NOTE - CRITICAL CARE ATTENDING CONTRIBUTION TO CARE
Patient is a 77-year-old female presents to the emergency room with a chief complaint of possible CVA.  Past medical history is limited patient reports questionable irregular heart.  For which she is on Coumadin every other day.  She last took Coumadin yesterday evening.  She reports that she is on no additional medications.  Her family does report that she has pacemaker although there is unclear why she has this.  Of further note it appears that in 2017 patient suffered a TIA with no residual deficits.  Per history patient went to bed around 10 PM last night and was in her normal state of health.  She awoke at around 730 this morning and when she tried to get up from bed she fell to the ground due to left-sided weakness.  Patient reports pain into her low back and left hip since and states she was unable to get up.  She also reports a headache with dizziness although when questioned about the dizziness patient cannot quantify this.  She reports that she has been experiencing the dizziness for the last 2 weeks.  Denies visual changes nausea vomiting chest pain shortness of breath abdominal pain extremity numbness or weakness. Patient presenting to the emergency room for signs and symptoms concerning for possible CVA.  As her last known well was 10 PM the night prior she is outside of the tPA window but still would be a thrombectomy candidate.  Will obtain screening labs neuro stroke imaging and will monitor.  Patient's NIH at the bedside was an 8 CTA imaging did reveal thrombus in the right distal ICA and right M1 A1 segments.  Patient updated transfer center contacted imaging reviewed by telestroke and neuro intervention.  The decision was then made to transfer patient to [place] for further work-up and possible intervention.  Aspirin ordered rectally per transfer request.  Blood pressure remained stable at this time patient appears to be in a flutter although rate controlled.  No further deficits noted at this time.

## 2022-11-11 NOTE — CHART NOTE - NSCHARTNOTEFT_GEN_A_CORE
Interventional Neuro- Radiology   Procedure Note     Procedure:Selective Cerebral Angiography   Pre- Procedure Diagnosis:  Occlusion of the distal right supraclinoid ICA and right proximal M1 and A1 segments. Separately, there is occlusion of a  right distal M1/proximal M2 branch.  : Dr. Ortega     Anesthesia: (general anesthesia)    Sheath:    I/Os:  Fluids:  Muhammad:  Contrast:  Estimated   Blood Loss:    Antibiotics:    Vitals:    Preliminary Report:  Under MAC/ general anesthesia, using a ___Fr short/long sheath to the right/ left/ bilateral groin examination of left vertebral artery/ left common carotid artery/ left external carotid artey/ right vertebral artery/ right common carotid artery/ right external carotid artery via selective cerebral angiography demonstrates ________. ( Official note to follow).    Patient tolerated procedure well, vital signs as noted above,  no change in neurological status noted.  Results discussed with   Groin sheath d/c'ed at , manual compression held to hemostasis, no active bleeding, no hematoma, soft throughout, + pedal pulses, angio-seal device applied, quick clot and safeguard balloon dressing applied at _____h.  STAT labs, CBC/BMP/PTT at ____h.  Patient transfered to PACU/ ICU. Interventional Neuro- Radiology   Procedure Note     Procedure:Selective Cerebral Angiography   Pre- Procedure Diagnosis:  Occlusion of the distal right supraclinoid ICA and right proximal M1 and A1 segments. Separately, there is occlusion of a  right distal M1/proximal M2 branch.  : Dr. Ortega     Anesthesia: (general anesthesia)    Sheath:  6 Fr BMX     I/Os:  Fluids:  SEE anesthesia /nursing report     Preliminary Report:  Under  general anesthesia, using a 6 Fr BMX right groin examination of  right ICA/ common carotid artery artery via selective cerebral angiography demonstrates terminal right ICA occlusion, TICI 0 four passses final TICI 2 b recanalization , 6ml IA TPA injected post procedure, JENNIFER CT with no gross hemorrhage . ( Official note to follow).    Patient tolerated procedure well, vital signs as noted above,  no change in neurological status noted.  Results discussed with   Groin sheath d/c'ed   manual compression held to hemostasis, no active bleeding, no hematoma, soft throughout, + pedal pulses, angio-seal device applied, quick clot and safeguard balloon dressing applied    STAT labs, CBC/BMP/PTT per ICU   Patient transfered to  ICU. Interventional Neuro- Radiology   Procedure Note     Procedure:Selective Cerebral Angiography   Pre- Procedure Diagnosis:  Occlusion of the distal right supraclinoid ICA and right proximal M1 and A1 segments. Separately, there is occlusion of a  right distal M1/proximal M2 branch.  : Dr. Ortega     Anesthesia: (general anesthesia)    Sheath:  6 Fr BMX     I/Os:  Fluids:  SEE anesthesia /nursing report     Preliminary Report:  Under  general anesthesia, using a 6 Fr BMX right groin examination of  right ICA/ common carotid artery artery via selective cerebral angiography demonstrates terminal right ICA occlusion, TICI 0 four passses final TICI 2 b recanalization , 6ml IA TPA injected post procedure, JENNIFER CT with no gross hemorrhage . ( Official note to follow).    Patient tolerated procedure well, vital signs as noted above,  no change in neurological status noted.  Results discussed with   Groin sheath d/c'ed   manual compression held to hemostasis, no active bleeding, no hematoma, soft throughout, + pedal pulses, angio-seal device applied, quick clot and safeguard balloon dressing applied    STAT labs, CBC/BMP/PTT per ICU   Patient transfered to  ICU.    SBP goal 110-160mmHg Interventional Neuro- Radiology   Procedure Note     Procedure:Selective Cerebral Angiography   Pre- Procedure Diagnosis:  Occlusion of the distal right supraclinoid ICA and right proximal M1 and A1 segments. Separately, there is occlusion of a  right distal M1/proximal M2 branch.  : Dr. Jordan Cruz.  Dr. Castañeda.  Anesthesia: (general anesthesia)    Sheath:  6 Fr BMX     I/Os:  Fluids:  SEE anesthesia /nursing report     Preliminary Report:  Under  general anesthesia, using a 6 Fr BMX right groin examination of  right ICA/ common carotid artery artery via selective cerebral angiography demonstrates terminal right ICA occlusion, TICI 0 four passses final TICI 2 b recanalization , 6ml IA TPA injected post procedure, JENNIFER CT with no gross hemorrhage . ( Official note to follow).    Patient tolerated procedure well, vital signs as noted above,  no change in neurological status noted.  Results discussed with   Groin sheath d/c'ed   manual compression held to hemostasis, no active bleeding, no hematoma, soft throughout, + pedal pulses, angio-seal device applied, quick clot and safeguard balloon dressing applied    STAT labs, CBC/BMP/PTT per ICU   Patient transferred to  ICU.    SBP goal 110-160mmHg Interventional Neuro- Radiology   Procedure Note     Procedure:Selective Cerebral Angiography and mechanical thrombectomy with intra-arterial thrombolysis  Pre- Procedure Diagnosis:  Occlusion of the distal right supraclinoid ICA and right proximal M1 and A1 segments. Separately, there is occlusion of a  right distal M1/proximal M2 branch.  : Dr. Jordan Cruz.  Dr. Castañeda.  Anesthesia: (general anesthesia)    Sheath:  6 Fr BMX     I/Os:  Fluids:  SEE anesthesia /nursing report     Preliminary Report:  Under  general anesthesia, using a 6 Fr BMX right groin examination of  right ICA/ common carotid artery artery via selective cerebral angiography demonstrates terminal right ICA occlusion, TICI 0 four passses final TICI 2 b recanalization , 6ml IA TPA injected post procedure, JENNIFER CT with no gross hemorrhage . ( Official note to follow).    Patient tolerated procedure well, vital signs as noted above,  no change in neurological status noted.  Results discussed with   Groin sheath d/c'ed   manual compression held to hemostasis, no active bleeding, no hematoma, soft throughout, + pedal pulses, angio-seal device applied, quick clot and safeguard balloon dressing applied    STAT labs, CBC/BMP/PTT per ICU   Patient transferred to  ICU.    SBP goal 110-160mmHg

## 2022-11-11 NOTE — ED PROVIDER NOTE - RESPIRATORY NEGATIVE STATEMENT, MLM
no chest pain, no cough, and no shortness of breath. PAST SURGICAL HISTORY:  History of mitral valve repair 2008    ICD Guidant    S/P IVC filter 2008    S/P Partial Gastrectomy

## 2022-11-11 NOTE — ED PROVIDER NOTE - NSICDXFAMILYHX_GEN_ALL_CORE_FT
FAMILY HISTORY:  Father  Still living? No  Cerebrovascular accident (CVA), Age at diagnosis: Age Unknown    Sibling  Still living? Yes, Estimated age: Age Unknown  Cerebrovascular accident (CVA), Age at diagnosis: Age Unknown

## 2022-11-11 NOTE — H&P ADULT - NSICDXPASTMEDICALHX_GEN_ALL_CORE_FT
PAST MEDICAL HISTORY:  Atrial fibrillation     Heart murmur after rheumatic heart disease     Stroke

## 2022-11-11 NOTE — PROGRESS NOTE ADULT - SUBJECTIVE AND OBJECTIVE BOX
NIGHTTIME ROUNDS    Recent events:  Available labs, vitals, imaging reviewed.    Exam:  AO x3, FC, EO spont, face symmetric, tongue midline, EOMI, PERRLA, motor - JAQUEZ  Sensation intact to LT.  CTAB  S1S2 present  Abd soft, NT, ND  No peripheral edema    Assessment:    Plan:    No changes made to the current management.      NIGHTTIME ROUNDS    Recent events: underwent TICI 2B R M1 thrombectomy.   Available labs, vitals, imaging reviewed.    Exam:   AO x3, FC, EO spont, face symmetric, tongue midline, EOMI, PERRLA, motor - JAQUEZ  Sensation intact to LT.  CTAB  S1S2 present  Abd soft, NT, ND  No peripheral edema; R groin with no signs of hemorrhage. Peripheral pulses present BL LE.    Assessment:  76 yo F, Mandarin speaking, with acute CVA due to R M1 occlusion, NIH 11, mRS 0 on presentatoin.  No tPA as was out of the window. S/p TICI 2B MT.  SSS, Afib. RV PPM in place. Anticoagulated on Warfarin.  Low NL BP, likely baseline - reports baseline low BP (was told its in 80s on doctor's appointments), denies any cardiac Sx, trops negative.  Bedside sono with minimal BL Blines, no pleural or cardiac effusion, RV syst function preserved, likely borderline LV syst function, mitral stenosis, large LA.     Plan:  neurochecks  pending official TTE  maintain SBP , if pressors needed - will consider Vasopressin ggt  cont IV hydration for 12 hrs post-contrast, cautious hydration as with MV stenosis, concern for pHTN  INR therapeutic (goal 2-3), repeat coags in am - will eventually need AC to be re-started for high-risk Afib  follow CTH or MRI  No other changes made to the current management.

## 2022-11-11 NOTE — ED ADULT NURSE NOTE - CHIEF COMPLAINT QUOTE
sent from [place] for eval of left sided weakness and aphasia. confirmed m1 distal occlusion pta.

## 2022-11-11 NOTE — PROGRESS NOTE ADULT - SUBJECTIVE AND OBJECTIVE BOX
HPI:  78 yo woman, transfered from [place] with L sided weakness, LKN 10pm/. NISSS 11. CTH with er INR is subtherapeutic, not a TPA candidate due to LKW 10-1030pm on 11/10/22. CTH without evidence of hemorrhage, CTA Head/Neck demonstrated Occlusion of the distal right supraclinoid ICA and right proximal M1 and A1 segments. Separately, there is occlusion of a  right distal M1/proximal M2 branch. She is         VITALS:  T(C): , Max: 36 (11-11-22 @ 13:00)  HR:  (77 - 84)  BP:  (120/86 - 125/99)  ABP: --  RR:  (12 - 19)  SpO2:  (95% - 100%)  Wt(kg): --      11-11-22 @ 07:01  -  11-11-22 @ 14:27  --------------------------------------------------------  IN: 0 mL / OUT: 600 mL / NET: -600 mL    EXAMINATION:  General:  in NAD  HEENT:  MMM  Neuro:  awake, alert, oriented x 3, follows commands, EOMI, face symmetric, no PD, DF 5/5   Cards:  RRR  Respiratory:  no respiratory distress  Abdomen:  soft  Extremities:  no LE edema    Assessment/Plan:    HPI:  76 yo woman, transfered from [place] with L sided weakness, LKN 10pm. NISSS 11. CTH without any large territory stroke. INR is subtherapeutic, not a TPA candidate due to LKW 10-1030pm on 11/10/22. Found to have an occlusion of the distal right supraclinoid ICA and right proximal M1 and A1 segments. Separately, there is occlusion of a right distal M1/proximal M2 branch. s/p mechanical thrombectomy with TICI 2B.       VITALS:  T(C): , Max: 36 (11-11-22 @ 13:00)  HR:  (77 - 84)  BP:  (120/86 - 125/99)  ABP: --  RR:  (12 - 19)  SpO2:  (95% - 100%)  Wt(kg): --      11-11-22 @ 07:01  -  11-11-22 @ 14:27  --------------------------------------------------------  IN: 0 mL / OUT: 600 mL / NET: -600 mL    EXAMINATION:  General:  in NAD  HEENT:  MMM  Neuro:  awake, alert, oriented x 3, follows commands, EOMI, face symmetric, no PD, DF 5/5   Cards:  RRR  Respiratory:  no respiratory distress  Abdomen:  soft  Extremities:  no LE edema    Assessment/Plan:     Neuro:  q1h NC    CV:  SBP goal    Pulm:    GI:    Renal:  Na goal    Heme:  SCDs    ID:    Endo:   FS goal 120-180    Patient seen and examined by attending on 11/11/2022.    Patient is critically ill due to ? and at high risk for neurological deterioration or death due to:   HPI:  76 yo woman with h/o prior stroke 5-6 years ago with no residual deficits, at home on Warfarin, transferred from [place] with L sided weakness, LKN 10pm. NISSS 11. CTH without any large territory stroke. INR is subtherapeutic, not a TPA candidate due to LKW 10-1030pm on 11/10/22. Found to have an occlusion of the distal right supraclinoid ICA and right proximal M1 and A1 segments. Separately, there is occlusion of a right distal M1/proximal M2 branch. s/p mechanical thrombectomy with TICI 2B.       VITALS:  T(C): , Max: 36 (11-11-22 @ 13:00)  HR:  (77 - 84)  BP:  (120/86 - 125/99)  ABP: --  RR:  (12 - 19)  SpO2:  (95% - 100%)  Wt(kg): --      11-11-22 @ 07:01  -  11-11-22 @ 14:27  --------------------------------------------------------  IN: 0 mL / OUT: 600 mL / NET: -600 mL    EXAMINATION:  General:  in NAD  HEENT:  MMM  Neuro:  awake, alert, oriented x 3, follows commands, EOMI, face symmetric, no PD, DF 5/5   Cards:  RRR  Respiratory:  no respiratory distress  Abdomen:  soft  Extremities:  no LE edema    Assessment/Plan:     Now with NIHSS 0.     Neuro:  q1h NC  CTH in AM  LDL, HgA1C, TTE, TELE    CV:  SBP goal 100-160    Pulm:  on RA, DEREK    GI:  passed bedside swallow eval, start regular diet     Renal:  Na goal    Heme:  SCDs    ID:  DEREK    Endo:   FS goal 120-180    Patient seen and examined by attending on 11/11/2022.    Patient is critically ill due to ? and at high risk for neurological deterioration or death due to:   HPI:  76 yo woman with h/o prior stroke 5-6 years ago with no residual deficits, at home on Warfarin (family is not sure if patient has Afib), transferred from [place] with L sided weakness, LKN 10pm. NISSS 11. CTH without any large territory stroke. INR is subtherapeutic, not a TPA candidate due to LKW outside of the window. Found to have an occlusion of the distal right supraclinoid ICA and right proximal M1 and A1 segments. Separately, there is occlusion of a right distal M1/proximal M2 branch. s/p mechanical thrombectomy with TICI 2B.       VITALS:  T(C): , Max: 36 (11-11-22 @ 13:00)  HR:  (77 - 84)  BP:  (120/86 - 125/99)  ABP: --  RR:  (12 - 19)  SpO2:  (95% - 100%)  Wt(kg): --      11-11-22 @ 07:01  -  11-11-22 @ 14:27  --------------------------------------------------------  IN: 0 mL / OUT: 600 mL / NET: -600 mL    EXAMINATION:  General:  in NAD  HEENT:  MMM  Neuro:  awake, alert, oriented x 3, speech is fluent, follows commands, EOMI, face symmetric, no PD, R DF 5/5, no LE drift  Cards:  RRR  Respiratory:  no respiratory distress  Abdomen:  soft  Extremities:  no LE edema    Assessment/Plan:   76 yo woman with h/o prior stroke 5-6 years ago with no residual deficits, at home on Warfarin (family is not sure if patient has Afib), transferred from [place] with L sided weakness, LKN 10pm. NISSS 11. CTH without any large territory stroke. INR is subtherapeutic, not a TPA candidate due to LKW outside of the window. Found to have an occlusion of the distal right supraclinoid ICA and right proximal M1 and A1 segments. Separately, there is occlusion of a right distal M1/proximal M2 branch. S/p mechanical thrombectomy with TICI 2B. Now with NIHSS 0.     Neuro:  q1h NC  CTH in AM  LDL, HgA1C, TTE, TELE  clarify indication for Warfarin with PMD, hold Warfarin for today    CV:  SBP goal 100-160    Pulm:  on RA, DEREK    GI:  passed bedside swallow eval, start regular diet   bowel regimen    Renal:  NS at 75cc/hr     Heme:  SCDs, hold Lov ppx for post-thrombectomy day 0    ID:  DEREK    Endo:   FS goal 120-180    Patient seen and examined by attending on 11/11/2022.    Patient is critically ill due to acute R MCA stroke s/p mechanical thrombectomy and at high risk for neurological deterioration or death due to: potential complications such as ICH and hemorrhagic conversion of stroke, requiring close neurologic monitoring.

## 2022-11-11 NOTE — ED PROVIDER NOTE - NIH STROKE SCALE: 4. FACIAL PALSY, QM
Detail Level: Detailed
Quality 110: Preventive Care And Screening: Influenza Immunization: Influenza Immunization not Administered for Documented Reasons.
Additional Notes: No flu vaccine due to personal reasons
(1) Minor paralysis (flattened nasolabial fold, asymmetry on smiling)

## 2022-11-11 NOTE — ED PROVIDER NOTE - OBJECTIVE STATEMENT
Patient is a 77-year-old female presents to the emergency room with a chief complaint of possible CVA.  Past medical history is limited patient reports questionable irregular heart.  For which she is on Coumadin every other day.  She last took Coumadin yesterday evening.  She reports that she is on no additional medications.  Her family does report that she has pacemaker although there is unclear why she has this.  Of further note it appears that in 2017 patient suffered a TIA with no residual deficits.  Per history patient went to bed around 10 PM last night and was in her normal state of health.  She awoke at around 730 this morning and when she tried to get up from bed she fell to the ground due to left-sided weakness.  Patient reports pain into her low back and left hip since and states she was unable to get up.  She also reports a headache with dizziness although when questioned about the dizziness patient cannot quantify this.  She reports that she has been experiencing the dizziness for the last 2 weeks.  Denies visual changes nausea vomiting chest pain shortness of breath abdominal pain extremity numbness or weakness.

## 2022-11-11 NOTE — ED ADULT NURSE NOTE - OBJECTIVE STATEMENT
Pt brought in by her family via private car for left sided weakness , slurred speech this morning. Pt reported woke up this morning fell on the floor and unable to get up. Last known well 1030 pm last night - Pt reported went to bed normal. PMH CVA Pt also complains of dizziness and headache but no vomiting Pt brought in by her family via private car for left sided weakness , slurred speech this morning. Pt reported woke up this morning fell on the floor and unable to get up. Last known well 1030 pm last night - Pt reported went to bed normal. PMH CVA Pt also complains of dizziness and headache but no vomiting Pt interview via  # [id number] [place]

## 2022-11-11 NOTE — H&P ADULT - HISTORY OF PRESENT ILLNESS
HPI:  78 y/o F presents as transfer from [place] as Code Stroke and possible biplane candidate. She was last known well at 10 pm last night, fell in bathroom after walking in herself, at which point  noticed left sided weakness. She was taken to [facility] where CT demonstrated a hyper-dense right mCA - patient's INR was subtherapeutic, not a tPA candidate reported last seen well 10pm 11/10/22. Per  with Mandarin , the patient fell in the bathroom at 730am this morning, went to the bathroom twice last night, but he was asleep and wasn't sure if she was normal during that time or not.      [facility] 11    [facility] SS:  DATE:11/11/22  TIME: 1000  1A: Level of consciousness (0-3): 0  1B: Questions (0-2):  0  1C: Commands (0-2): 0   2: Gaze (0-2): 1  3: Visual fields (0-3): 1   4: Facial palsy (0-3): 2  MOTOR:  5A: Left arm motor drift (0-4): 2  5B: Right arm motor drift (0-4):0   6A: Left leg motor drift (0-4): 1  6B: Right leg motor drift (0-4):0   7: Limb ataxia (0-2): 0  SENSORY:  8: Sensation (0-2): 1  SPEECH:  9: Language (0-3): 0  10: Dysarthria (0-2): 1   EXTINCTION:  11: Extinction/inattention (0-2): 2

## 2022-11-11 NOTE — ED PROVIDER NOTE - OBJECTIVE STATEMENT
78 y/o F presents as transfer from [place] as Code Stroke and possible biplane candidate. She was last known well at 10 pm last night, fell in bathroom after walking in herself, at which point  noticed left hemiparesis of left face, arm and leg. She was taken to [facility] where CT demonstrated a hyper-dense right M2 segment - patient's INR was subtherapeutic, not a tPA candidate due to LKW. Per  with Mandarin , the patient fell in the bathroom at 730 this morning, went to the bathroom twice last night, but he was asleep and wasn't sure if she was normal during that time or not.

## 2022-11-11 NOTE — H&P ADULT - ASSESSMENT
ASSESSMENT: 78 y/o F presents as transfer from [place] as Code Stroke and possible biplane candidate. She was last known well at 10 pm  11/10/22, fell in bathroom after walking in herself, at which point  noticed left sided weakness. She was taken to [facility] where CT demonstrated a hyper-dense right mCA - patient's INR was subtherapeutic, not a tPA candidate reported last seen well 10pm 11/10/22. Per  that day , the patient fell in the bathroom at 730am 11/11/22, went to the bathroom twice last night, but he was asleep and wasn't sure if she was normal during that time or not. Patient taken to IR and underwent Successful TICI 2b revascularization of the right internal carotid and  middle cerebral arteries utilizing mechanical thrombectomy and  intra-arterial thrombolysis with residual small M4 branch occlusions of  the left frontal and parietal lobes.

## 2022-11-11 NOTE — ED ADULT NURSE NOTE - OBJECTIVE STATEMENT
Patient arrived as transfer from [facility] as code Biplane. Patient brought to CT scan and immediately brought up to cath lab for biplane procedure as per neurology team. Blood sugar drawn in ED as 82, did not cross over in [place] as patient's name was changed. Patient placed on monitor and brought up with neuro PA and ED RN at bedside.

## 2022-11-11 NOTE — H&P ADULT - NSHPPHYSICALEXAM_GEN_ALL_CORE
[facility] SS:  DATE:11/11/22  TIME: 1000  1A: Level of consciousness (0-3): 0  1B: Questions (0-2):  0  1C: Commands (0-2): 0   2: Gaze (0-2): 1  3: Visual fields (0-3): 1   4: Facial palsy (0-3): 2  MOTOR:  5A: Left arm motor drift (0-4): 2  5B: Right arm motor drift (0-4):0   6A: Left leg motor drift (0-4): 1  6B: Right leg motor drift (0-4):0   7: Limb ataxia (0-2): 0  SENSORY:  8: Sensation (0-2): 1  SPEECH:  9: Language (0-3): 0  10: Dysarthria (0-2): 1   EXTINCTION:  11: Extinction/inattention (0-2): 2      Neuro Exam: Awake and alert, oriented x3, fluent, normal naming and repetition, follows simple  commands. left gaze palsy, attends to left and able to count fingers but  visual extinction and possible LHH , no nystagmus, left facial palsy, tongue midline.  left sensory extinction, left arm falls to bed in < 5 sec, left leg drift

## 2022-11-11 NOTE — H&P ADULT - PROBLEM SELECTOR PLAN 1
Post thrombectomy  neuro, vital, and neurovascular checks as per protocol   CT head in am   admit to neuro ICU  Continue close monitoring for neurologic deterioration  110-160mmHg   statin regimen to LDL goal < 70  dysphagia screen   LDL/A1C  Physical therapy/OT/Speech eval/treatment.   ASA then plan for anticoagulation based on clinical and radiological improvement

## 2022-11-12 DIAGNOSIS — Z79.01 LONG TERM (CURRENT) USE OF ANTICOAGULANTS: ICD-10-CM

## 2022-11-12 DIAGNOSIS — I48.20 CHRONIC ATRIAL FIBRILLATION, UNSPECIFIED: ICD-10-CM

## 2022-11-12 LAB
ANION GAP SERPL CALC-SCNC: 8 MMOL/L — SIGNIFICANT CHANGE UP (ref 5–17)
BUN SERPL-MCNC: 21.7 MG/DL — HIGH (ref 8–20)
CA-I BLD-SCNC: 1.24 MMOL/L — SIGNIFICANT CHANGE UP (ref 1.15–1.33)
CALCIUM SERPL-MCNC: 8.8 MG/DL — SIGNIFICANT CHANGE UP (ref 8.4–10.5)
CHLORIDE SERPL-SCNC: 110 MMOL/L — HIGH (ref 96–108)
CHOLEST SERPL-MCNC: 125 MG/DL — SIGNIFICANT CHANGE UP
CO2 SERPL-SCNC: 24 MMOL/L — SIGNIFICANT CHANGE UP (ref 22–29)
CREAT SERPL-MCNC: 1.01 MG/DL — SIGNIFICANT CHANGE UP (ref 0.5–1.3)
EGFR: 57 ML/MIN/1.73M2 — LOW
GLUCOSE SERPL-MCNC: 111 MG/DL — HIGH (ref 70–99)
HCT VFR BLD CALC: 34.7 % — SIGNIFICANT CHANGE UP (ref 34.5–45)
HDLC SERPL-MCNC: 66 MG/DL — SIGNIFICANT CHANGE UP
HGB BLD-MCNC: 11.3 G/DL — LOW (ref 11.5–15.5)
LIPID PNL WITH DIRECT LDL SERPL: 48 MG/DL — SIGNIFICANT CHANGE UP
MAGNESIUM SERPL-MCNC: 1.8 MG/DL — SIGNIFICANT CHANGE UP (ref 1.6–2.6)
MCHC RBC-ENTMCNC: 29.1 PG — SIGNIFICANT CHANGE UP (ref 27–34)
MCHC RBC-ENTMCNC: 32.6 GM/DL — SIGNIFICANT CHANGE UP (ref 32–36)
MCV RBC AUTO: 89.4 FL — SIGNIFICANT CHANGE UP (ref 80–100)
NON HDL CHOLESTEROL: 59 MG/DL — SIGNIFICANT CHANGE UP
PHOSPHATE SERPL-MCNC: 4.3 MG/DL — SIGNIFICANT CHANGE UP (ref 2.4–4.7)
PLATELET # BLD AUTO: 238 K/UL — SIGNIFICANT CHANGE UP (ref 150–400)
POTASSIUM SERPL-MCNC: 4.3 MMOL/L — SIGNIFICANT CHANGE UP (ref 3.5–5.3)
POTASSIUM SERPL-SCNC: 4.3 MMOL/L — SIGNIFICANT CHANGE UP (ref 3.5–5.3)
RBC # BLD: 3.88 M/UL — SIGNIFICANT CHANGE UP (ref 3.8–5.2)
RBC # FLD: 14.6 % — HIGH (ref 10.3–14.5)
SODIUM SERPL-SCNC: 142 MMOL/L — SIGNIFICANT CHANGE UP (ref 135–145)
TRIGL SERPL-MCNC: 55 MG/DL — SIGNIFICANT CHANGE UP
TSH SERPL-MCNC: 0.4 UIU/ML — SIGNIFICANT CHANGE UP (ref 0.27–4.2)
WBC # BLD: 11.34 K/UL — HIGH (ref 3.8–10.5)
WBC # FLD AUTO: 11.34 K/UL — HIGH (ref 3.8–10.5)

## 2022-11-12 PROCEDURE — 99233 SBSQ HOSP IP/OBS HIGH 50: CPT

## 2022-11-12 PROCEDURE — 70450 CT HEAD/BRAIN W/O DYE: CPT | Mod: 26

## 2022-11-12 PROCEDURE — 99221 1ST HOSP IP/OBS SF/LOW 40: CPT

## 2022-11-12 RX ORDER — ASPIRIN/CALCIUM CARB/MAGNESIUM 324 MG
81 TABLET ORAL DAILY
Refills: 0 | Status: DISCONTINUED | OUTPATIENT
Start: 2022-11-12 | End: 2022-11-14

## 2022-11-12 RX ORDER — ATORVASTATIN CALCIUM 80 MG/1
20 TABLET, FILM COATED ORAL AT BEDTIME
Refills: 0 | Status: DISCONTINUED | OUTPATIENT
Start: 2022-11-12 | End: 2022-11-14

## 2022-11-12 RX ORDER — PANTOPRAZOLE SODIUM 20 MG/1
40 TABLET, DELAYED RELEASE ORAL
Refills: 0 | Status: DISCONTINUED | OUTPATIENT
Start: 2022-11-12 | End: 2022-11-14

## 2022-11-12 RX ORDER — WARFARIN SODIUM 2.5 MG/1
2.5 TABLET ORAL ONCE
Refills: 0 | Status: COMPLETED | OUTPATIENT
Start: 2022-11-12 | End: 2022-11-12

## 2022-11-12 RX ORDER — ENOXAPARIN SODIUM 100 MG/ML
30 INJECTION SUBCUTANEOUS EVERY 24 HOURS
Refills: 0 | Status: DISCONTINUED | OUTPATIENT
Start: 2022-11-12 | End: 2022-11-13

## 2022-11-12 RX ADMIN — Medication 81 MILLIGRAM(S): at 12:04

## 2022-11-12 RX ADMIN — ATORVASTATIN CALCIUM 20 MILLIGRAM(S): 80 TABLET, FILM COATED ORAL at 21:47

## 2022-11-12 RX ADMIN — WARFARIN SODIUM 2.5 MILLIGRAM(S): 2.5 TABLET ORAL at 21:47

## 2022-11-12 RX ADMIN — ENOXAPARIN SODIUM 30 MILLIGRAM(S): 100 INJECTION SUBCUTANEOUS at 21:47

## 2022-11-12 NOTE — PROGRESS NOTE ADULT - SUBJECTIVE AND OBJECTIVE BOX
CC: CVA- transfer for  emergent thrombectomy. (12 Nov 2022 15:14)    INTERVAL HPI/OVERNIGHT EVENTS:  no acute events overnight    Vital Signs Last 24 Hrs  T(C): 36.4 (12 Nov 2022 12:01), Max: 36.7 (12 Nov 2022 08:01)  T(F): 97.6 (12 Nov 2022 12:01), Max: 98.1 (12 Nov 2022 08:01)  HR: 65 (12 Nov 2022 16:00) (63 - 94)  BP: 94/72 (12 Nov 2022 16:00) (82/61 - 119/74)  BP(mean): 80 (12 Nov 2022 16:00) (67 - 88)  RR: 14 (12 Nov 2022 16:00) (14 - 31)  SpO2: 97% (12 Nov 2022 16:00) (92% - 100%)    Parameters below as of 12 Nov 2022 16:00  Patient On (Oxygen Delivery Method): room air    PHYSICAL EXAM:  General: in no acute distress  Eyes: PERRLA, EOMI; conjunctiva and sclera clear  Head: Normocephalic; atraumatic  ENMT: No nasal discharge; airway clear  Neck: Supple; non tender; no masses  Respiratory: No wheezes, rales or rhonchi  Cardiovascular: irregular rate and rhythm  Gastrointestinal: Soft non-tender non-distended; Normal bowel sounds  Extremities: Normal range of motion, No clubbing, cyanosis or edema  Vascular: Peripheral pulses palpable 2+ bilaterally  Neurological: Alert and oriented x4; no focal strength deficit, no sensory deficit  Skin: Warm and dry. No acute rash  Psychiatric: Cooperative and appropriate    I&O's Detail    11 Nov 2022 07:01  -  12 Nov 2022 07:00  --------------------------------------------------------  IN:    sodium chloride 0.9%: 950 mL    Sodium Chloride 0.9% Bolus: 500 mL  Total IN: 1450 mL    OUT:    Indwelling Catheter - Urethral (mL): 1335 mL  Total OUT: 1335 mL    Total NET: 115 mL    12 Nov 2022 07:01  -  12 Nov 2022 16:54  --------------------------------------------------------  IN:    sodium chloride 0.9%: 200 mL  Total IN: 200 mL    OUT:    Voided (mL): 200 mL  Total OUT: 200 mL    Total NET: 0 mL    CARDIAC MARKERS ( 11 Nov 2022 19:26 )  x     / <0.01 ng/mL / 80 U/L / x     / x                            11.3   11.34 )-----------( 238      ( 12 Nov 2022 05:10 )             34.7     12 Nov 2022 05:10    142    |  110    |  21.7   ----------------------------<  111    4.3     |  24.0   |  1.01     Ca    8.8        12 Nov 2022 05:10  Phos  4.3       12 Nov 2022 05:10  Mg     1.8       12 Nov 2022 05:10    PT/INR - ( 11 Nov 2022 19:26 )   PT: 23.4 sec;   INR: 2.00 ratio      PTT - ( 11 Nov 2022 19:26 )  PTT:35.8 sec  CAPILLARY BLOOD GLUCOSE          MEDICATIONS  (STANDING):  aspirin  chewable 81 milliGRAM(s) Oral daily  atorvastatin 20 milliGRAM(s) Oral at bedtime  enoxaparin Injectable 30 milliGRAM(s) SubCutaneous every 24 hours  pantoprazole    Tablet 40 milliGRAM(s) Oral before breakfast    MEDICATIONS  (PRN):    RADIOLOGY & ADDITIONAL TESTS:

## 2022-11-12 NOTE — DISCHARGE NOTE NURSING/CASE MANAGEMENT/SOCIAL WORK - PATIENT PORTAL LINK FT
You can access the FollowMyHealth Patient Portal offered by [facility] by registering at the following website: [email or web address]. By joining [employer]’s FollowMyHealth portal, you will also be able to view your health information using other applications (apps) compatible with our system.

## 2022-11-12 NOTE — PROGRESS NOTE ADULT - ASSESSMENT
78 y/o F presents as transfer from [place] as code stroke with hx of a-fib on coumadin, she recent stopped taking for dental procedure for 15 days. Patient presented with stroke like symptoms and CT head with acute infarct. She underwent thrombectomy with neuroIR and admitted to Neuro ICU. Now medically improved and without deficit, NIHSS of 0. Being downgraded to medical floor at this time.    #acute ischemic stroke - Acute infarct involving the right basal ganglia region - s/p thrombectomy of R ICA  - being downgraded to step down for q 2 neuro and q2 vitals  - OOB to chair - patient ambulating independently  - neurology following  - patient has cardiologist in [place] Dr. Kirk Hansen  - she has a hx of PPM that was placed 2 years ago - doesn't have card on her but will bring tomorrow morning  - will then need MRI form filled out  - needs MRI brain  - echo done  - cardiology called from neuro ICU to determine timing of coumadin restart    #a-fib with valvular disease (rheumatic valve disease) with PPM  - rate controlled  - echo with normal EF and mild MR with moderate MS- ?repeat echo due to elevated HR on first echo  - cardiology to see and determine  - coumadin on hold currently    #DVT ppx - SCD for now - lovenox daily  #DISPO: OOB and ambulating, needs MRI and plan for coumadin restart, likely restart here with lovenox bridge; need to ensure PPM is compatible.

## 2022-11-12 NOTE — CONSULT NOTE ADULT - ASSESSMENT
ASSESSMENT: 78 y/o F presents as transfer from [place] as Code Stroke and possible biplane candidate. She was last known well at 10 pm  11/10/22, fell in bathroom after walking in herself, at which point  noticed left sided weakness. She was taken to [facility] where CT demonstrated a hyper-dense right mCA - patient's INR was subtherapeutic, not a tPA candidate reported last seen well 10pm 11/10/22. Per  with Mandarin , the patient fell in the bathroom at 730am 11/11/22, went to the bathroom twice last night, but he was asleep and wasn't sure if she was normal during that time or not. Patient taken for emergent thrombectomy.     NEURO: Continue close monitoring for neurologic deterioration, permissive HTN- further bp control pending recanalization , titrate statin to LDL goal less than 70, MRI Brain w/o, MRA Head w/o and Neck w/contrast. Physical therapy/OT/Speech eval/treatment.     ANTITHROMBOTIC THERAPY: pending clinical and radiological stability     CARDIOVASCULAR: check TTE, cardiac monitoring                              GASTROINTESTINAL:  dysphagia screen  pending      Diet: NPO for now, pending dysphagia screen     -Hydration as tolerated     HEMATOLOGY: patient should have all age and risk appropriate malignancy screenings      DVT ppx:  pending clinical and radiological stability, SCD for now          DISPOSITION: Rehab or home depending on PT eval once stable and workup is complete      CORE MEASURES:        Admission NIHSS:  11     TPA: [] YES [x] NO      LDL/HDL: pending      Depression Screen: pending      Statin Therapy: pending      Dysphagia Screen: [] PASS [] FAIL - pending      Smoking [] YES [x] NO      Afib [x] YES [] NO -reported irregular rhythm on coumadin      Stroke Education [x] YES [] NO     
78 yo woman with h/o prior stroke 5-6 years ago with no residual deficits, at home on Warfarin, transferred from [facility] with L sided weakness, LKN 10pm. NISSS 11. CTH without any large territory stroke. INR is subtherapeutic, not a TPA candidate due to LKW 10-1030pm on 11/10/22. Found to have an occlusion of the distal right supraclinoid ICA and right proximal M1 and A1 segments. Separately, there is occlusion of a right distal M1/proximal M2 branch. s/p mechanical thrombectomy with TICI 2B.  Called for anticoagulation recommendations.  Patient and  provided history, patient states she stopped taking her coumadin for dental procedure and did not resume for total of 10 days.

## 2022-11-12 NOTE — PATIENT PROFILE ADULT - NSTOBACCONEVERSMOKERY/N_GEN_A
No but might help her sleep better
Patient called in reporting that her dermatologist put her on Hydroxyzine. She is wanting to know if this will interfere with anything with her sleep. Please advise.
Phoned and notified patient, patient voiced understanding.
No

## 2022-11-12 NOTE — DISCHARGE NOTE NURSING/CASE MANAGEMENT/SOCIAL WORK - NSDCPEFALRISK_GEN_ALL_CORE
For information on Fall & Injury Prevention, visit: [email or web address] OR  [email or web address] OR  https://www.cdc.gov/steadi/patient.html

## 2022-11-12 NOTE — CONSULT NOTE ADULT - PROBLEM SELECTOR RECOMMENDATION 9
Patient has hx of chronic afib, rate control. Hx of rheumatic heart disease.   -Patient stopped taking warfarin for dental procedure and did not resume for total of 10 days  -Patient can resume her current dose of coumadin and add asa 81mg daily

## 2022-11-12 NOTE — PATIENT PROFILE ADULT - FALL HARM RISK - HARM RISK INTERVENTIONS

## 2022-11-12 NOTE — CONSULT NOTE ADULT - NS ATTEND AMEND GEN_ALL_CORE FT
seen with above,    77F Mandarin-speaking history significant for osteoporosis, prior CVA 2017 dx with Afib/rheumatic mitral stenosis (on warfarin, follows with outside cardiologist Dr. Kirk Hansen in [facility]), had recent dental work self discontinued warfarin x10 days without bridging with parenteral anticoagulant found with acute CVA with M1 occlusion underwent thrombectomy, denies residual deficit  -acute CVA in setting of self holding warfarin without bridging, advised need to be bridged in the future for any procedures when off warfarin as elevated CHADSVasc with recurrent CVA  -INR goal 2-3, add ASA 81mg as well given with rheumatic MS likely with ROLF thrombus, TTE with moderate MS need follow up with outside cardiologist  -currently DOAC use not in the guideline with valvular Afib (mitral stenosis)  -LDL 48   -discussed with her  at beside   -reconsult if new cardiac issue arise        Fortunato Perdomo DO, [facility]  Faculty Non-Invasive Cardiologist  [phone] seen with above,    77F Mandarin-speaking history significant for osteoporosis, prior CVA 2017 dx with Afib/rheumatic mitral stenosis (on warfarin, follows with outside cardiologist Dr. Kirk Hansen in [facility]), micra-PPM, had recent dental work self discontinued warfarin x10 days without bridging with parenteral anticoagulant found with acute CVA with M1 occlusion underwent thrombectomy, denies residual deficit  -acute CVA in setting of self holding warfarin without bridging, advised need to be bridged in the future for any procedures when off warfarin as elevated CHADSVasc with recurrent CVA  -INR goal 2-3, add ASA 81mg as well given with rheumatic MS likely with ROLF thrombus, TTE with moderate MS need follow up with outside cardiologist  -currently DOAC use not in the guideline with valvular Afib (mitral stenosis)  -LDL 48   -discussed with her  at beside   -reconsult if new cardiac issue arise        Fortunato Perdomo DO, [facility]  Faculty Non-Invasive Cardiologist  [phone]

## 2022-11-12 NOTE — CONSULT NOTE ADULT - PROBLEM SELECTOR RECOMMENDATION 2
Patient hx of afib with rate control   -to resume anticoagulation with warfarin as per previously prescribed   -Recommend to start ASA 81 mg daily  -No further inpatient cardiac work up at this time.  Will sign off.  Please reconsult if needed.

## 2022-11-12 NOTE — PROGRESS NOTE ADULT - ASSESSMENT
Assessment/Plan:     Now with NIHSS 0.     Neuro:  q1h NC  CTH in AM  LDL, HgA1C, TTE, TELE    CV:  SBP goal 100-160    Pulm:  on RA, DEREK    GI:  passed bedside swallow eval, start regular diet     Renal:  Na goal    Heme:  SCDs    ID:  DEREK    Endo:   FS goal 120-180    Patient seen and examined by attending on 11/11/2022.    Patient is critically ill due to ? and at high risk for neurological deterioration or death due to: Assessment/Plan: 77y old  Female who presents with a chief complaint of stroke, h/o demand PM with AICD  Now with NIHSS 0.     Neuro:  q4h NC  A1c 5.7  ASA and statin    CV:  SBP goal     Pulm:  on RA, DEREK  OOB    GI:  regular diet   BM 11/12  bowel reg  restart home protonix    Renal:  DC IV fluid  voiding    Heme:  SCDs  hold CMD for now  recheck INR    ID:  DEREK    Endo:   FS goal 120-180    Patient seen and examined by attending on 11/12/2022.   Assessment/Plan: 77y old  Female who presents with a chief complaint of stroke, h/o demand PM with AICD  Now with NIHSS 0.     Neuro:  q4h NC  A1c 5.7  ASA and statin  MRI brain noncon    CV:  SBP goal   TTE EF 60-65%    Pulm:  on RA, DEREK  OOB    GI:  regular diet   BM 11/12  bowel reg  restart home protonix    Renal:  DC IV fluid  voiding    Heme:  SCDs  hold CMD for now  recheck INR    ID:  DEREK    Endo:   FS goal 120-180    Patient seen and examined by attending on 11/12/2022.   Assessment/Plan: 77y old  Female who presents with a chief complaint of stroke, h/o demand PM with AICD  Now with NIHSS 0.     Neuro:  q4h NC  A1c 5.7  ASA and statin  MRI brain noncon    CV: h/o rheumatic heart disease and mitral stenosis  SBP goal   TTE EF 60-65%  discuss restarting CMD  cardiology consult    Pulm:  on RA, DEREK  OOB    GI:  regular diet   BM 11/12  bowel reg  restart home protonix    Renal:  DC IV fluid  voiding    Heme:  SCDs  hold CMD for now  recheck INR    ID:  DEREK    Endo:   FS goal 120-180    Patient seen and examined by attending on 11/12/2022.

## 2022-11-12 NOTE — PROVIDER CONTACT NOTE (OTHER) - ACTION/TREATMENT ORDERED:
MD aware 500ml bolus finsihing
MD aware no new orders given. MD instructed RN SBP >90 okay patients normal SBP 80s-90s
MD instructed RN SBP >90 okay patients normal SBP 80s-90s.  no additional orders given
PA aware of BP. PA will change BP parameter order to 
500cc Bolus finishing,  MD instructed RN SBP >90 okay patients normal SBP 80s-90s
PA to change BP parameter to

## 2022-11-12 NOTE — PROVIDER CONTACT NOTE (OTHER) - SITUATION
BP 82/61
BP 93/70 out of ordered range
SBP 96/73 , Goal -160
pt BP 97/71
BP 90/67   SBP Goal 100-160
BP 88/57 SBP Goal >90

## 2022-11-12 NOTE — CONSULT NOTE ADULT - SUBJECTIVE AND OBJECTIVE BOX
[facility]                                              CARDIOLOGY AT [place] [address]                                             Telephone: [phone]. Fax:[phone]                                                       CARDIOLOGY CONSULTATION NOTE                                                                                             History obtained by: Patient and medical record  Community Cardiologist:   Dr. Kirk Hansen    obtained: Yes [ x ] No [  ]  Reason for Consultation: anticoagulation recommendation   Available out pt records reviewed: Yes [  ] No [  ]    Chief complaint:    Patient is a 77y old  Female who presents with a chief complaint of stroke (11 Nov 2022 19:55)      HPI:     78 yo woman with h/o prior stroke 5-6 years ago with no residual deficits, at home on Warfarin, transferred from [place] with L sided weakness, LKN 10pm. NISSS 11. CTH without any large territory stroke. INR is subtherapeutic, not a TPA candidate due to LKW 10-1030pm on 11/10/22. Found to have an occlusion of the distal right supraclinoid ICA and right proximal M1 and A1 segments. Separately, there is occlusion of a right distal M1/proximal M2 branch. s/p mechanical thrombectomy with TICI 2B.          CARDIAC TESTING    ECHO:    < from: TTE Echo Complete w/ Contrast w/ Doppler (11.11.22 @ 18:28) >  PHYSICIAN INTERPRETATION:  Left Ventricle: The left ventricular internal cavity size is normal.   There is mild left ventricular hypertrophy involving the septal wall. The   LVH involves septal walls.  Global LV systolic function was normal. Left ventricular ejection   fraction, by visual estimation, is 60 to 65%. The mitral in-flow pattern   reveals no discernable A-wave, therefore no comment on diastolic function   can be made.  Right Ventricle: Normal right ventricular size and function. TV S' 0.1   m/s.  Left Atrium: Mildly enlarged left atrium. Intact intra-atrial septum   without shunt.  Right Atrium: Moderately enlarged right atrium.  Pericardium: There is no evidence of pericardial effusion.  Mitral Valve: The mitral valve is rheumatic. Moderate thickening of the   anterior and posterior mitral valve leaflets.Mobility is moderately   decreased for both leaflets. Mild mitral valve regurgitation is seen.   Rheumatic mitral valve. Mild mitral regurgitation. Moderate mitral   stenosis. MV MG 8.4 mmHg. Rhythm strip demonstrates Atrial flutter with   variable AV Block during the study. Consider to obtain limited FU study   when heart rate controlled to further evaluate mitral stenosis severity   if clinically indicated.  Tricuspid Valve: Mild tricuspid regurgitation is visualized. Estimated   pulmonary artery systolic pressure is 39.5 mmHg assuming a right atrial   pressure of 3 mmHg, which is consistent with borderline pulmonary   hypertension.  Aortic Valve: The aortic valve is trileaflet. No evidence of aortic   stenosis. Sclerotic aortic valve with normal opening. Mild aortic valve   regurgitation is seen.  Pulmonic Valve: The pulmonic valve is thickened with good excursion. Mild   to moderate pulmonic valve regurgitation.  Aorta: The aortic root and ascending aorta are structurally normal, with  no evidence of dilitation.  Pulmonary Artery: The pulmonary artery is mildly dilated.  Venous: The inferior vena cava is normal. The inferior vena cava was   normal sized, with respiratory size variation greater than 50%.      Summary:   1. Left ventricular ejection fraction, by visual estimation, is 60 to   65%.   2. LV Ejection Fraction by Serrano's Method with a biplane EF of 62 %.   3. Normal global left ventricular systolic function.   4. The mitral in-flow pattern reveals no discernable A-wave, therefore   no comment on diastolic function can be made.   5. Rheumatic mitral valve. Mild mitral regurgitation. Moderate mitral   stenosis. MV MG 8.4 mmHg. Rhythm strip demonstrates Atrial flutter with   variable AV Block during the study. Consider to obtain limited FU study   when heart rate controlled to further evaluate mitral stenosis severity   if clinically indicated.   6. Mild aortic regurgitation.   7. Estimated pulmonary artery systolic pressure is 39.5 mmHg assuming a   right atrial pressure of 3 mmHg, which is consistent with borderline   pulmonary hypertension.    MD Valeria Electronically signed on 11/12/2022 at 10:16:09 AM      PAST MEDICAL HISTORY  Afib  CVA  Rheumatic fever         PAST SURGICAL HISTORY  appendectomy      SOCIAL HISTORY:  Denies smoking/alcohol/drugs  CIGARETTES:     ALCOHOL:  DRUGS:    FAMILY HISTORY:    Family History of Cardiovascular Disease:  Yes [  ] No [  ]  Coronary Artery Disease in first degree relative: Yes [  ] No [  ]  Sudden Cardiac Death in First degree relative: Yes [  ] No [  ]    HOME MEDICATIONS:      CURRENT CARDIAC MEDICATIONS:      CURRENT OTHER MEDICATIONS:  pantoprazole    Tablet 40 milliGRAM(s) Oral before breakfast  aspirin  chewable 81 milliGRAM(s) Oral daily  atorvastatin 20 milliGRAM(s) Oral at bedtime  enoxaparin Injectable 30 milliGRAM(s) SubCutaneous every 24 hours      ALLERGIES:   Allergy Status Unknown      REVIEW OF SYMPTOMS:   CONSTITUTIONAL: No fever, no chills, no weight loss, no weight gain, no fatigue   ENMT:  No vertigo; No sinus or throat pain  NECK: No pain or stiffness  CARDIOVASCULAR: No chest pain, no dyspnea, no syncope/presyncope, no palpitations, no dizziness, no Orthopnea, no Paroxsymal nocturnal dyspnea  RESPIRATORY: no Shortness of breath, no cough, no wheezing  : No dysuria, no hematuria   GI: No dark color stool, no nausea, no diarrhea, no constipation, no abdominal pain   NEURO: No headache, no slurred speech   MUSCULOSKELETAL: No joint pain or swelling; No muscle, back, or extremity pain  PSYCH: No agitation, no anxiety.    ALL OTHER REVIEW OF SYSTEMS ARE NEGATIVE.    VITAL SIGNS:  T(C): 36.4 (11-12-22 @ 12:01), Max: 36.7 (11-11-22 @ 16:10)  T(F): 97.6 (11-12-22 @ 12:01), Max: 98.1 (11-12-22 @ 08:01)  HR: 67 (11-12-22 @ 15:00) (63 - 94)  BP: 97/64 (11-12-22 @ 15:00) (82/61 - 119/74)  RR: 20 (11-12-22 @ 15:00) (15 - 31)  SpO2: 100% (11-12-22 @ 15:00) (92% - 100%)    INTAKE AND OUTPUT:     11-11 @ 07:01  -  11-12 @ 07:00  --------------------------------------------------------  IN: 1450 mL / OUT: 1335 mL / NET: 115 mL    11-12 @ 07:01  -  11-12 @ 15:20  --------------------------------------------------------  IN: 200 mL / OUT: 200 mL / NET: 0 mL        PHYSICAL EXAM:  Constitutional: Comfortable . No acute distress.   HEENT: Atraumatic and normocephalic , neck is supple . no JVD. No carotid bruit.  CNS: A&Ox3. No focal deficits.   Respiratory: CTAB, unlabored   Cardiovascular: RRR normal s1 s2. No murmur. No rubs or gallop.  Gastrointestinal: Soft, non-tender. +Bowel sounds.   Extremities: 2+ Peripheral Pulses, No clubbing, cyanosis, or edema  Psychiatric: Calm . no agitation.   Skin: Warm and dry, no ulcers on extremities     LABS:  ( 11 Nov 2022 19:26 )  Troponin T  <0.01,  CPK  80   , CKMB  X    , BNP X                                  11.3   11.34 )-----------( 238      ( 12 Nov 2022 05:10 )             34.7     11-12    142  |  110<H>  |  21.7<H>  ----------------------------<  111<H>  4.3   |  24.0  |  1.01    Ca    8.8      12 Nov 2022 05:10  Phos  4.3     11-12  Mg     1.8     11-12      PT/INR - ( 11 Nov 2022 19:26 )   PT: 23.4 sec;   INR: 2.00 ratio         PTT - ( 11 Nov 2022 19:26 )  PTT:35.8 sec    11-12-22 @ 05:10  CHolesterol: 125,  HDL: 66,  LDL: 48, Triglycerides: 55       Thyroid Stimulating Hormone, Serum: 0.40 uIU/mL (11-12-22 @ 05:10)      INTERPRETATION OF TELEMETRY:  afib rate control 65    ECG:    < from: 12 Lead ECG (11.11.22 @ 19:25) >    Q-T Interval 412 ms    QTC Calculation(Bazett) 469 ms    R Axis 87 degrees    T Axis -86 degrees    Diagnosis Line Atrial flutter with variable A-V block with occasional ventricular-paced complexes  Minimal voltage criteria for LVH, may be normal variant  Septal infarct , age undetermined  T wave abnormality, consider inferior ischemia  T wave abnormality, consider anterolateral ischemia    Confirmed by VELMA REBOLLEDO ([id number]) on 11/11/2022 11:48:29 PM    Prior ECG: Yes [  ] No [x  ]

## 2022-11-12 NOTE — PROGRESS NOTE ADULT - SUBJECTIVE AND OBJECTIVE BOX
Chief complaint:   Patient is a 77y old  Female who presents with a chief complaint of stroke (11 Nov 2022 19:55)    HPI:        24hr EVENTS:      ROS: [ ]  Unable to assess due to mental status   All other systems negative    -----------------------------------------------------------------------------------------------------------------------------------------------------------------------------------  ICU Vital Signs Last 24 Hrs  T(C): 36.7 (12 Nov 2022 08:01), Max: 36.7 (11 Nov 2022 16:10)  T(F): 98.1 (12 Nov 2022 08:01), Max: 98.1 (12 Nov 2022 08:01)  HR: 74 (12 Nov 2022 06:00) (65 - 94)  BP: 96/71 (12 Nov 2022 06:00) (82/61 - 125/99)  BP(mean): 81 (12 Nov 2022 06:00) (67 - 109)  ABP: --  ABP(mean): --  RR: 22 (12 Nov 2022 06:00) (12 - 31)  SpO2: 98% (12 Nov 2022 06:00) (92% - 100%)    O2 Parameters below as of 12 Nov 2022 06:00  Patient On (Oxygen Delivery Method): room air            I&O's Summary    11 Nov 2022 07:01  -  12 Nov 2022 07:00  --------------------------------------------------------  IN: 1400 mL / OUT: 1335 mL / NET: 65 mL        MEDICATIONS  (STANDING):  aspirin  chewable 81 milliGRAM(s) Oral daily  atorvastatin 20 milliGRAM(s) Oral at bedtime  enoxaparin Injectable 30 milliGRAM(s) SubCutaneous every 24 hours  sodium chloride 0.9%. 1000 milliLiter(s) (50 mL/Hr) IV Continuous <Continuous>      RESPIRATORY:        IMAGING:   Recent imaging studies were reviewed.    LAB RESULTS:                          11.3   11.34 )-----------( 238      ( 12 Nov 2022 05:10 )             34.7       PT/INR - ( 11 Nov 2022 19:26 )   PT: 23.4 sec;   INR: 2.00 ratio         PTT - ( 11 Nov 2022 19:26 )  PTT:35.8 sec    11-12    142  |  110<H>  |  21.7<H>  ----------------------------<  111<H>  4.3   |  24.0  |  1.01    Ca    8.8      12 Nov 2022 05:10  Phos  4.3     11-12  Mg     1.8     11-12                  -----------------------------------------------------------------------------------------------------------------------------------------------------------------------------------    PHYSICAL EXAM:  General: Calm, laying in bed  HEENT: MMM  Neuro:  -Mental status- No acute distress, AOx3, conversational, following commands  -CN- PERRL 3mm, EOMI, tongue midline, face symmetric  -Motor- full strength in all ext  -Sensation- intact to LT   -Coordination- no dysmetria noted    CV:   Pulm: Clear to auscultation  Abd: Soft, nontender, nondistended  Ext: No edema  Skin: warm, dry   Chief complaint:   Patient is a 77y old  Female who presents with a chief complaint of stroke (11 Nov 2022 19:55)    24hr EVENTS:  thrombectomy    ROS: no complaints  All other systems negative    -----------------------------------------------------------------------------------------------------------------------------------------------------------------------------------  ICU Vital Signs Last 24 Hrs  T(C): 36.7 (12 Nov 2022 08:01), Max: 36.7 (11 Nov 2022 16:10)  T(F): 98.1 (12 Nov 2022 08:01), Max: 98.1 (12 Nov 2022 08:01)  HR: 74 (12 Nov 2022 06:00) (65 - 94)  BP: 96/71 (12 Nov 2022 06:00) (82/61 - 125/99)  BP(mean): 81 (12 Nov 2022 06:00) (67 - 109)  ABP: --  ABP(mean): --  RR: 22 (12 Nov 2022 06:00) (12 - 31)  SpO2: 98% (12 Nov 2022 06:00) (92% - 100%)    O2 Parameters below as of 12 Nov 2022 06:00  Patient On (Oxygen Delivery Method): room air      I&O's Summary    11 Nov 2022 07:01  -  12 Nov 2022 07:00  --------------------------------------------------------  IN: 1400 mL / OUT: 1335 mL / NET: 65 mL        MEDICATIONS  (STANDING):  aspirin  chewable 81 milliGRAM(s) Oral daily  atorvastatin 20 milliGRAM(s) Oral at bedtime  enoxaparin Injectable 30 milliGRAM(s) SubCutaneous every 24 hours  sodium chloride 0.9%. 1000 milliLiter(s) (50 mL/Hr) IV Continuous <Continuous>      IMAGING:   Recent imaging studies were reviewed.    LAB RESULTS:                          11.3   11.34 )-----------( 238      ( 12 Nov 2022 05:10 )             34.7       PT/INR - ( 11 Nov 2022 19:26 )   PT: 23.4 sec;   INR: 2.00 ratio         PTT - ( 11 Nov 2022 19:26 )  PTT:35.8 sec    11-12    142  |  110<H>  |  21.7<H>  ----------------------------<  111<H>  4.3   |  24.0  |  1.01    Ca    8.8      12 Nov 2022 05:10  Phos  4.3     11-12  Mg     1.8     11-12      -----------------------------------------------------------------------------------------------------------------------------------------------------------------------------------    PHYSICAL EXAM:  General: Calm, laying in bed  HEENT: MMM  Neuro:  -Mental status- No acute distress, AOx3, conversational, following commands  -CN- PERRL 3mm, EOMI, tongue midline, face symmetric  -Motor- full strength in all ext  -Sensation- intact to LT   -Coordination- no dysmetria noted    CV:   Pulm: Clear to auscultation  Abd: Soft, nontender, nondistended  Ext: No edema  Skin: warm, dry   Chief complaint:   Patient is a 77y old  Female who presents with a chief complaint of stroke (11 Nov 2022 19:55)    24hr EVENTS:  thrombectomy    ROS: no complaints  All other systems negative    -----------------------------------------------------------------------------------------------------------------------------------------------------------------------------------  ICU Vital Signs Last 24 Hrs  T(C): 36.7 (12 Nov 2022 08:01), Max: 36.7 (11 Nov 2022 16:10)  T(F): 98.1 (12 Nov 2022 08:01), Max: 98.1 (12 Nov 2022 08:01)  HR: 74 (12 Nov 2022 06:00) (65 - 94)  BP: 96/71 (12 Nov 2022 06:00) (82/61 - 125/99)  BP(mean): 81 (12 Nov 2022 06:00) (67 - 109)  ABP: --  ABP(mean): --  RR: 22 (12 Nov 2022 06:00) (12 - 31)  SpO2: 98% (12 Nov 2022 06:00) (92% - 100%)    O2 Parameters below as of 12 Nov 2022 06:00  Patient On (Oxygen Delivery Method): room air      I&O's Summary    11 Nov 2022 07:01  -  12 Nov 2022 07:00  --------------------------------------------------------  IN: 1400 mL / OUT: 1335 mL / NET: 65 mL        MEDICATIONS  (STANDING):  aspirin  chewable 81 milliGRAM(s) Oral daily  atorvastatin 20 milliGRAM(s) Oral at bedtime  enoxaparin Injectable 30 milliGRAM(s) SubCutaneous every 24 hours  sodium chloride 0.9%. 1000 milliLiter(s) (50 mL/Hr) IV Continuous <Continuous>      IMAGING:   Recent imaging studies were reviewed.    LAB RESULTS:                          11.3   11.34 )-----------( 238      ( 12 Nov 2022 05:10 )             34.7       PT/INR - ( 11 Nov 2022 19:26 )   PT: 23.4 sec;   INR: 2.00 ratio         PTT - ( 11 Nov 2022 19:26 )  PTT:35.8 sec    11-12    142  |  110<H>  |  21.7<H>  ----------------------------<  111<H>  4.3   |  24.0  |  1.01    Ca    8.8      12 Nov 2022 05:10  Phos  4.3     11-12  Mg     1.8     11-12      -----------------------------------------------------------------------------------------------------------------------------------------------------------------------------------    PHYSICAL EXAM:  General: Calm, laying in bed, mandarin speaking, ( assisted)  HEENT: MMM  Neuro:  -Mental status- No acute distress, AOx3, conversational, following commands  -CN- PERRL 3mm, EOMI, tongue midline, face symmetric  -Motor- full strength in all ext  -Sensation- intact to LT   -Coordination- no dysmetria noted    CV: RRR  Pulm: Clear to auscultation  Abd: Soft, nontender, nondistended  Ext: No edema  Skin: warm, dry

## 2022-11-13 LAB
ANION GAP SERPL CALC-SCNC: 9 MMOL/L — SIGNIFICANT CHANGE UP (ref 5–17)
APTT BLD: 39.9 SEC — HIGH (ref 27.5–35.5)
BASOPHILS # BLD AUTO: 0.01 K/UL — SIGNIFICANT CHANGE UP (ref 0–0.2)
BASOPHILS NFR BLD AUTO: 0.1 % — SIGNIFICANT CHANGE UP (ref 0–2)
BUN SERPL-MCNC: 23.6 MG/DL — HIGH (ref 8–20)
CALCIUM SERPL-MCNC: 8.3 MG/DL — LOW (ref 8.4–10.5)
CHLORIDE SERPL-SCNC: 107 MMOL/L — SIGNIFICANT CHANGE UP (ref 96–108)
CO2 SERPL-SCNC: 25 MMOL/L — SIGNIFICANT CHANGE UP (ref 22–29)
CREAT SERPL-MCNC: 0.99 MG/DL — SIGNIFICANT CHANGE UP (ref 0.5–1.3)
EGFR: 58 ML/MIN/1.73M2 — LOW
EOSINOPHIL # BLD AUTO: 0.1 K/UL — SIGNIFICANT CHANGE UP (ref 0–0.5)
EOSINOPHIL NFR BLD AUTO: 1.3 % — SIGNIFICANT CHANGE UP (ref 0–6)
GLUCOSE SERPL-MCNC: 95 MG/DL — SIGNIFICANT CHANGE UP (ref 70–99)
HCT VFR BLD CALC: 33 % — LOW (ref 34.5–45)
HGB BLD-MCNC: 10.7 G/DL — LOW (ref 11.5–15.5)
IMM GRANULOCYTES NFR BLD AUTO: 0.3 % — SIGNIFICANT CHANGE UP (ref 0–0.9)
INR BLD: 2.1 RATIO — HIGH (ref 0.88–1.16)
LYMPHOCYTES # BLD AUTO: 1.65 K/UL — SIGNIFICANT CHANGE UP (ref 1–3.3)
LYMPHOCYTES # BLD AUTO: 20.9 % — SIGNIFICANT CHANGE UP (ref 13–44)
MAGNESIUM SERPL-MCNC: 1.9 MG/DL — SIGNIFICANT CHANGE UP (ref 1.6–2.6)
MCHC RBC-ENTMCNC: 29.4 PG — SIGNIFICANT CHANGE UP (ref 27–34)
MCHC RBC-ENTMCNC: 32.4 GM/DL — SIGNIFICANT CHANGE UP (ref 32–36)
MCV RBC AUTO: 90.7 FL — SIGNIFICANT CHANGE UP (ref 80–100)
MONOCYTES # BLD AUTO: 0.53 K/UL — SIGNIFICANT CHANGE UP (ref 0–0.9)
MONOCYTES NFR BLD AUTO: 6.7 % — SIGNIFICANT CHANGE UP (ref 2–14)
NEUTROPHILS # BLD AUTO: 5.57 K/UL — SIGNIFICANT CHANGE UP (ref 1.8–7.4)
NEUTROPHILS NFR BLD AUTO: 70.7 % — SIGNIFICANT CHANGE UP (ref 43–77)
PHOSPHATE SERPL-MCNC: 3.6 MG/DL — SIGNIFICANT CHANGE UP (ref 2.4–4.7)
PLATELET # BLD AUTO: 232 K/UL — SIGNIFICANT CHANGE UP (ref 150–400)
POTASSIUM SERPL-MCNC: 4.3 MMOL/L — SIGNIFICANT CHANGE UP (ref 3.5–5.3)
POTASSIUM SERPL-SCNC: 4.3 MMOL/L — SIGNIFICANT CHANGE UP (ref 3.5–5.3)
PROTHROM AB SERPL-ACNC: 24.6 SEC — HIGH (ref 10.5–13.4)
RBC # BLD: 3.64 M/UL — LOW (ref 3.8–5.2)
RBC # FLD: 14.6 % — HIGH (ref 10.3–14.5)
SODIUM SERPL-SCNC: 141 MMOL/L — SIGNIFICANT CHANGE UP (ref 135–145)
WBC # BLD: 7.88 K/UL — SIGNIFICANT CHANGE UP (ref 3.8–10.5)
WBC # FLD AUTO: 7.88 K/UL — SIGNIFICANT CHANGE UP (ref 3.8–10.5)

## 2022-11-13 PROCEDURE — 99233 SBSQ HOSP IP/OBS HIGH 50: CPT

## 2022-11-13 RX ORDER — SODIUM CHLORIDE 9 MG/ML
1000 INJECTION INTRAMUSCULAR; INTRAVENOUS; SUBCUTANEOUS
Refills: 0 | Status: COMPLETED | OUTPATIENT
Start: 2022-11-13 | End: 2022-11-13

## 2022-11-13 RX ORDER — WARFARIN SODIUM 2.5 MG/1
3 TABLET ORAL ONCE
Refills: 0 | Status: COMPLETED | OUTPATIENT
Start: 2022-11-13 | End: 2022-11-13

## 2022-11-13 RX ADMIN — PANTOPRAZOLE SODIUM 40 MILLIGRAM(S): 20 TABLET, DELAYED RELEASE ORAL at 06:05

## 2022-11-13 RX ADMIN — ATORVASTATIN CALCIUM 20 MILLIGRAM(S): 80 TABLET, FILM COATED ORAL at 23:49

## 2022-11-13 RX ADMIN — SODIUM CHLORIDE 75 MILLILITER(S): 9 INJECTION INTRAMUSCULAR; INTRAVENOUS; SUBCUTANEOUS at 18:02

## 2022-11-13 RX ADMIN — Medication 81 MILLIGRAM(S): at 11:34

## 2022-11-13 NOTE — PHYSICAL THERAPY INITIAL EVALUATION ADULT - PERTINENT HX OF CURRENT PROBLEM, REHAB EVAL
76 y/o F presents as transfer from [place] as code stroke with hx of a-fib on coumadin, she recent stopped taking for dental procedure for 15 days. Patient presented with stroke like symptoms and CT head with acute infarct. She underwent thrombectomy with neuroIR and admitted to Neuro ICU.

## 2022-11-13 NOTE — PHYSICAL THERAPY INITIAL EVALUATION ADULT - ADDITIONAL COMMENTS
Language lines  [id number] used for Mandarin. Pt reports living with spouse in the main level of a house with 9 IOANA. Pt's son, his wife and son live upstairs.  Pt amb with SAC as needed and is independent with functional mobility, ADLs, and IADLs.  Pt has support of family. Pt owns SAC.

## 2022-11-13 NOTE — OCCUPATIONAL THERAPY INITIAL EVALUATION ADULT - RANGE OF MOTION EXAMINATION, UPPER EXTREMITY
left hand unable to make full fist due to edema/bilateral UE Active ROM was WNL (within normal limits)

## 2022-11-13 NOTE — OCCUPATIONAL THERAPY INITIAL EVALUATION ADULT - PLANNED THERAPY INTERVENTIONS, OT EVAL
ADL retraining/IADL retraining/balance training/fine motor coordination training/neuromuscular re-education/transfer training

## 2022-11-13 NOTE — PHYSICAL THERAPY INITIAL EVALUATION ADULT - RANGE OF MOTION EXAMINATION, REHAB EVAL
except L hand with limited movement due to edema/bilateral upper extremity ROM was WFL (within functional limits)/bilateral lower extremity ROM was WFL (within functional limits)

## 2022-11-13 NOTE — PROGRESS NOTE ADULT - ASSESSMENT
78 y/o F presents as transfer from [place] as code stroke with hx of a-fib on coumadin, she recent stopped taking for dental procedure for 15 days. Patient presented with stroke like symptoms and CT head with acute infarct. She underwent thrombectomy with neuroIR and admitted to Neuro ICU. Now medically improved and without deficit, NIHSS of 0. Being downgraded to medical floor at this time.    1-acute ischemic CVA   Acute infarct involving the right basal ganglia region  s/p thrombectomy of R ICA  - being downgraded to step down for q 2 neuro and q2 vitals 11/12   cont aspirin , warfarin  for atrial fib   patient ambulating independently  - neurology follow up   - patient has cardiologist in [place] Dr. Kirk Hansen  she has her PPM info , will contact radiology and coordinate for MR of brain     2-a-fib / valvular disease (rheumatic valve disease) with PPM  - rate controlled  cardiology consult appreciated   cont warfarin daily , monitor INR   stop lovenox INR is 2  2   echo with normal EF and mild MR with moderate MS    3-Prerenal azotemia   encourage oral fluid intake   BP is soft will start NS keep BP higher in setting of stroke     DVT ppx -   SCD for now  on warfarin INR 2   pending MRI home soon

## 2022-11-13 NOTE — PROGRESS NOTE ADULT - SUBJECTIVE AND OBJECTIVE BOX
pt is transferred to [facility] for  CVA- transfer for  emergent thrombectomy    chart reviewed   seen earlier via Mandarin  Jazmine [id number]        MEDICATIONS  (STANDING):  aspirin  chewable 81 milliGRAM(s) Oral daily  atorvastatin 20 milliGRAM(s) Oral at bedtime  enoxaparin Injectable 30 milliGRAM(s) SubCutaneous every 24 hours  pantoprazole    Tablet 40 milliGRAM(s) Oral before breakfast        Vital Signs Last 24 Hrs  T(C): 36.4 (12 Nov 2022 12:01), Max: 36.7 (12 Nov 2022 08:01)  T(F): 97.6 (12 Nov 2022 12:01), Max: 98.1 (12 Nov 2022 08:01)  HR: 65 (12 Nov 2022 16:00) (63 - 94)  BP: 94/72 (12 Nov 2022 16:00) (82/61 - 119/74)  BP(mean): 80 (12 Nov 2022 16:00) (67 - 88)  RR: 14 (12 Nov 2022 16:00) (14 - 31)  SpO2: 97% (12 Nov 2022 16:00) (92% - 100%)      PHYSICAL EXAM:  General: in no acute distress,   Head: Normocephalic; atraumatic  Neck: Supple; non tender; no masses  Respiratory: No wheezes, rales or rhonchi, CTA   Cardiovascular: irregular rate and rhythm, s1 /s2   Gastrointestinal: Soft non-tender non-distended; Normal bowel sounds  Extremities:, No clubbing, cyanosis or edema  Neurological: Alert and oriented x4; no focal strength deficit, no sensory deficit, speech normal     Psychiatric: Cooperative and appropriate                          10.7   7.88  )-----------( 232      ( 13 Nov 2022 02:31 )             33.0   11-13    141  |  107  |  23.6<H>  ----------------------------<  95  4.3   |  25.0  |  0.99    Ca    8.3<L>      13 Nov 2022 02:31  Phos  3.6     11-13  Mg     1.9     11-13

## 2022-11-13 NOTE — OCCUPATIONAL THERAPY INITIAL EVALUATION ADULT - GENERAL OBSERVATIONS, REHAB EVAL
pt received in bedside recliner and left as found, +tele and  in place, 0/10 pain. left hand edema. pt pleasant, motivated and following all commands and used Mandarian  via [employer]

## 2022-11-13 NOTE — OCCUPATIONAL THERAPY INITIAL EVALUATION ADULT - ADDITIONAL COMMENTS
as per pt, lives with  in private home and son, daughter n law and 2 grandchildren live above, 9 IOANA and lives on ground level, has wlak in shower, owns rw and cane, was independent prior, and assist is available 24/7 as needed

## 2022-11-14 ENCOUNTER — TRANSCRIPTION ENCOUNTER (OUTPATIENT)
Age: 77
End: 2022-11-14

## 2022-11-14 VITALS
OXYGEN SATURATION: 100 % | TEMPERATURE: 98 F | HEART RATE: 75 BPM | DIASTOLIC BLOOD PRESSURE: 65 MMHG | RESPIRATION RATE: 18 BRPM | SYSTOLIC BLOOD PRESSURE: 112 MMHG

## 2022-11-14 DIAGNOSIS — I63.9 CEREBRAL INFARCTION, UNSPECIFIED: ICD-10-CM

## 2022-11-14 DIAGNOSIS — Z95.0 PRESENCE OF CARDIAC PACEMAKER: Chronic | ICD-10-CM

## 2022-11-14 LAB
INR BLD: 2.2 RATIO — HIGH (ref 0.88–1.16)
PROTHROM AB SERPL-ACNC: 25.7 SEC — HIGH (ref 10.5–13.4)

## 2022-11-14 PROCEDURE — 93005 ELECTROCARDIOGRAM TRACING: CPT

## 2022-11-14 PROCEDURE — C1894: CPT

## 2022-11-14 PROCEDURE — 85730 THROMBOPLASTIN TIME PARTIAL: CPT

## 2022-11-14 PROCEDURE — 93288 INTERROG EVL PM/LDLS PM IP: CPT | Mod: 26

## 2022-11-14 PROCEDURE — 83036 HEMOGLOBIN GLYCOSYLATED A1C: CPT

## 2022-11-14 PROCEDURE — C1773: CPT

## 2022-11-14 PROCEDURE — C8929: CPT

## 2022-11-14 PROCEDURE — 99239 HOSP IP/OBS DSCHRG MGMT >30: CPT

## 2022-11-14 PROCEDURE — 71045 X-RAY EXAM CHEST 1 VIEW: CPT

## 2022-11-14 PROCEDURE — 85027 COMPLETE CBC AUTOMATED: CPT

## 2022-11-14 PROCEDURE — 99232 SBSQ HOSP IP/OBS MODERATE 35: CPT

## 2022-11-14 PROCEDURE — 82330 ASSAY OF CALCIUM: CPT

## 2022-11-14 PROCEDURE — 82550 ASSAY OF CK (CPK): CPT

## 2022-11-14 PROCEDURE — 83735 ASSAY OF MAGNESIUM: CPT

## 2022-11-14 PROCEDURE — 99291 CRITICAL CARE FIRST HOUR: CPT | Mod: 25

## 2022-11-14 PROCEDURE — 80061 LIPID PANEL: CPT

## 2022-11-14 PROCEDURE — 61645 PERQ ART M-THROMBECT &/NFS: CPT

## 2022-11-14 PROCEDURE — 36415 COLL VENOUS BLD VENIPUNCTURE: CPT

## 2022-11-14 PROCEDURE — C1887: CPT

## 2022-11-14 PROCEDURE — 97163 PT EVAL HIGH COMPLEX 45 MIN: CPT

## 2022-11-14 PROCEDURE — 85025 COMPLETE CBC W/AUTO DIFF WBC: CPT

## 2022-11-14 PROCEDURE — 84484 ASSAY OF TROPONIN QUANT: CPT

## 2022-11-14 PROCEDURE — 84443 ASSAY THYROID STIM HORMONE: CPT

## 2022-11-14 PROCEDURE — C1769: CPT

## 2022-11-14 PROCEDURE — 80048 BASIC METABOLIC PNL TOTAL CA: CPT

## 2022-11-14 PROCEDURE — 84100 ASSAY OF PHOSPHORUS: CPT

## 2022-11-14 PROCEDURE — 70450 CT HEAD/BRAIN W/O DYE: CPT | Mod: MA

## 2022-11-14 PROCEDURE — 85610 PROTHROMBIN TIME: CPT

## 2022-11-14 PROCEDURE — C1757: CPT

## 2022-11-14 PROCEDURE — C1760: CPT

## 2022-11-14 RX ORDER — ASPIRIN/CALCIUM CARB/MAGNESIUM 324 MG
1 TABLET ORAL
Qty: 30 | Refills: 0
Start: 2022-11-14 | End: 2022-12-13

## 2022-11-14 RX ORDER — ATORVASTATIN CALCIUM 80 MG/1
1 TABLET, FILM COATED ORAL
Qty: 30 | Refills: 0
Start: 2022-11-14 | End: 2022-12-13

## 2022-11-14 RX ORDER — WARFARIN SODIUM 2.5 MG/1
3 TABLET ORAL ONCE
Refills: 0 | Status: DISCONTINUED | OUTPATIENT
Start: 2022-11-14 | End: 2022-11-14

## 2022-11-14 RX ORDER — WARFARIN SODIUM 2.5 MG/1
0.5 TABLET ORAL
Qty: 8 | Refills: 0
Start: 2022-11-14 | End: 2022-12-15

## 2022-11-14 RX ORDER — WARFARIN SODIUM 2.5 MG/1
1 TABLET ORAL
Qty: 30 | Refills: 0
Start: 2022-11-14 | End: 2022-12-13

## 2022-11-14 RX ADMIN — WARFARIN SODIUM 3 MILLIGRAM(S): 2.5 TABLET ORAL at 00:00

## 2022-11-14 RX ADMIN — PANTOPRAZOLE SODIUM 40 MILLIGRAM(S): 20 TABLET, DELAYED RELEASE ORAL at 07:31

## 2022-11-14 RX ADMIN — Medication 81 MILLIGRAM(S): at 11:55

## 2022-11-14 NOTE — DISCHARGE NOTE PROVIDER - ATTENDING DISCHARGE PHYSICAL EXAMINATION:
Vital Signs Last 24 Hrs  T(C): 36.7 (14 Nov 2022 13:00), Max: 37.2 (13 Nov 2022 17:04)  T(F): 98 (14 Nov 2022 13:00), Max: 98.9 (13 Nov 2022 17:04)  HR: 78 (14 Nov 2022 13:00) (62 - 78)  BP: 103/62 (14 Nov 2022 13:00) (93/55 - 136/92)  BP(mean): 103 (13 Nov 2022 16:41) (103 - 103)  RR: 18 (14 Nov 2022 13:00) (18 - 19)  SpO2: 97% (14 Nov 2022 13:00) (96% - 98%)    Parameters below as of 14 Nov 2022 13:00  Patient On (Oxygen Delivery Method): room air    CONSTITUTIONAL: NAD, sitting up in bed   ENMT: Moist oral mucosa, EOMI   RESPIRATORY: Normal respiratory effort; lungs are clear to auscultation bilaterally   CARDIOVASCULAR: Irregular rate and rhythm, normal S1 and S2, No lower extremity edema   ABDOMEN: Nontender to palpation, normoactive bowel sounds   MUSCULOSKELETAL:  No clubbing or cyanosis of digits   PSYCH: A+O to person, place, and time; affect appropriate  NEUROLOGY: No gross sensory or motor deficits   SKIN: No rashes; warm and dry

## 2022-11-14 NOTE — PROGRESS NOTE ADULT - NS ATTEND AMEND GEN_ALL_CORE FT
Patient was seen and examined by Dr Tyler Ortega and  me. History and exam as documented above by PA/NP was confirmed by me.   Agree with plan as outlined above.

## 2022-11-14 NOTE — PROGRESS NOTE ADULT - ASSESSMENT
ASSESSMENT: 76 y/o F presents as transfer from [place] as Code Stroke and possible biplane candidate. She was last known well at 10 pm  11/10/22, fell in bathroom after walking in herself, at which point  noticed left sided weakness. She was taken to [facility] where CT demonstrated a hyper-dense right mCA - patient's INR was subtherapeutic, not a tPA candidate reported last seen well 10pm 11/10/22. Per  that day , the patient fell in the bathroom at 730am 11/11/22, went to the bathroom twice last night, but he was asleep and wasn't sure if she was normal during that time or not. Patient taken to IR and underwent Successful TICI 2b revascularization of the right internal carotid and  middle cerebral arteries utilizing mechanical thrombectomy and  intra-arterial thrombolysis with residual small M4 branch occlusions of  the left frontal and parietal lobes. Etiology likely cardioembolic given atrial fibrillation. Hx rheumatic heart disease.      NEURO: Continue close monitoring for neurologic deterioration, gradual normotension as tolerated 110-160mmHg , continue home statin regimen as LDL at goal of < 70 MRI Brain w/o, MRA Head w/o and Neck w/contrast not indicated as to likely not  at this time . Physical therapy/OT/Speech eval/treatment.     ANTITHROMBOTIC THERAPY: Warfarin     CARDIOVASCULAR:  TTE as noted , cardiac monitoring to ensure rate control, PPM interrogation, cardiology following                               GASTROINTESTINAL:  dysphagia screen  passed      Diet: advance as tolerated     -Hydration as tolerated     HEMATOLOGY: patient should have all age and risk appropriate malignancy screenings      DVT ppx:  pending clinical and radiological stability, SCD for now     DISPOSITION: Rehab or home depending on PT eval once stable and workup is complete      CORE MEASURES:        Admission NIHSS:  11     TPA: [] YES [x] NO      LDL/HDL: 48/66     Depression Screen: pending      Statin Therapy: as noted      Dysphagia Screen: [x] PASS [] FAIL      Smoking [] YES [x] NO      Afib [x] YES [] NO -reported irregular rhythm on coumadin      Stroke Education [x] YES [] NO      ASSESSMENT: 76 y/o F presents as transfer from [place] as Code Stroke and possible biplane candidate. She was last known well at 10 pm  11/10/22, fell in bathroom after walking in herself, at which point  noticed left sided weakness. She was taken to [facility] where CT demonstrated a hyper-dense right mCA - patient's INR was subtherapeutic, not a tPA candidate reported last seen well 10pm 11/10/22. Per  that day , the patient fell in the bathroom at 730am 11/11/22, went to the bathroom twice last night, but he was asleep and wasn't sure if she was normal during that time or not. Patient taken to IR and underwent Successful TICI 2b revascularization of the right internal carotid and  middle cerebral arteries utilizing mechanical thrombectomy and  intra-arterial thrombolysis with residual small M4 branch occlusions of  the left frontal and parietal lobes. Etiology likely cardioembolic given atrial fibrillation. Hx rheumatic heart disease.      NEURO: Continue close monitoring for neurologic deterioration, gradual normotension as tolerated 110-160mmHg , continue home statin regimen as LDL at goal of < 70 MRI Brain w/o, MRA Head w/o and Neck w/contrast not indicated as to likely not  at this time . Physical therapy/OT/Speech eval/treatment.     ANTITHROMBOTIC THERAPY: Warfarin     CARDIOVASCULAR:  TTE as noted , cardiac monitoring to ensure rate control, PPM interrogation, cardiology following                               GASTROINTESTINAL:  dysphagia screen  passed      Diet: advance as tolerated     -Hydration as tolerated     HEMATOLOGY: patient should have all age and risk appropriate malignancy screenings      DVT ppx:  pending clinical and radiological stability, SCD for now     DISPOSITION: Rehab or home depending on PT eval once stable and workup is complete      CORE MEASURES:        Admission NIHSS:  11     TPA: [] YES [x] NO      LDL/HDL: 48/66     Depression Screen: pending      Statin Therapy: as noted      Dysphagia Screen: [x] PASS [] FAIL      Smoking [] YES [x] NO      Afib [x] YES [] NO -reported irregular rhythm on coumadin      Stroke Education [x] YES [] NO

## 2022-11-14 NOTE — DISCHARGE NOTE PROVIDER - NSDCMRMEDTOKEN_GEN_ALL_CORE_FT
aspirin 81 mg oral tablet, chewable: 1 tab(s) orally once a day  atorvastatin 20 mg oral tablet: 1 tab(s) orally once a day (at bedtime)  PT : OUTPATIENT PT as recommended for stroke  warfarin 1 mg oral tablet: 0.5 tab(s) orally every other day   warfarin 2 mg oral tablet: 1 tab(s) orally once a day

## 2022-11-14 NOTE — PROCEDURE NOTE - ADDITIONAL PROCEDURE DETAILS
Device implanted on Jul/13/2020 by Dr. Renteria  Ventricular pacing 72%  Device is conditionally approved for use with MRI. MRI department will contact [employer] for reprogramming during scan. Order form completed and placed in chart. Please call EP service with any questions or concerns.

## 2022-11-14 NOTE — DISCHARGE NOTE PROVIDER - CARE PROVIDER_API CALL
Sonny Castro)  Neurology; Vascular Neurology  [facility] - Dept of Neurology, [address] [place] [address]  Phone: [phone]  Fax: [phone]  Follow Up Time:     Own Cardiologist,   Phone: (   )    -  Fax: (   )    -  Follow Up Time:

## 2022-11-14 NOTE — DISCHARGE NOTE PROVIDER - NSDCCPCAREPLAN_GEN_ALL_CORE_FT
PRINCIPAL DISCHARGE DIAGNOSIS  Diagnosis: Cerebrovascular accident (CVA)  Assessment and Plan of Treatment: Acute infarct involving the right basal ganglia region s/p thrombectomy of R ICA. Pt was started on aspirin and recommended to continue warfarin for atrial fib. Pt to follow up with Neurologist and cardiologist.      SECONDARY DISCHARGE DIAGNOSES  Diagnosis: Atrial fibrillation  Assessment and Plan of Treatment: with valvular disease (rheumatic valve disease) with PPM - rate controlled, Please follow up with your own Cardiologist

## 2022-11-14 NOTE — PROGRESS NOTE ADULT - ASSESSMENT
76 y/o F presents as transfer from [place] as code stroke with hx of a-fib on coumadin, she recent stopped taking for dental procedure for 15 days. Patient presented with stroke like symptoms and CT head with acute infarct. She underwent thrombectomy with neuro IR and admitted to Neuro ICU. Now medically improved and without deficit, NIHSS of 0. Transferred to medical floor at this time.     Acute ischemic CVA   Acute infarct involving the right basal ganglia region s/p thrombectomy of R ICA  c/w Neuro checks   cont aspirin, warfarin for atrial fib   Neuro follow up requested today   Pt to follow up with cardiologist in [place] Dr. Kirk Hansen  awaiting recs from Neuro regarding MRI imaging as pt has her PPM info     A-fib / valvular disease (rheumatic valve disease) with PPM  rate controlled  cardiology consult appreciated   cont warfarin daily, monitor INR   echo with normal EF and mild MR with moderate MS    Prerenal azotemia   Encourage oral fluid intake   Improving     DVT ppx - coumadin   Dispo: pending MRI

## 2022-11-14 NOTE — PROGRESS NOTE ADULT - SUBJECTIVE AND OBJECTIVE BOX
Preliminary note, official recommendations pending attending signature/review     HPI:78 y/o F presents as transfer from [place] as Code Stroke and possible biplane candidate. She was last known well at 10 pm last night, fell in bathroom after walking in herself, at which point  noticed left sided weakness. She was taken to [facility] where CT demonstrated a hyper-dense right mCA - patient's INR was subtherapeutic, not a tPA candidate reported last seen well 10pm 11/10/22. Per  with Mandarin , the patient fell in the bathroom at 730am this morning, went to the bathroom twice last night, but he was asleep and wasn't sure if she was normal during that time or not.  NIH 11 Pre-MRS 0     SUBJECTIVE: No events overnight.  No new neurologic complaints.  ROS reported negative unless otherwise noted.    aspirin  chewable 81 milliGRAM(s) Oral daily  atorvastatin 20 milliGRAM(s) Oral at bedtime  pantoprazole    Tablet 40 milliGRAM(s) Oral before breakfast  warfarin 3 milliGRAM(s) Oral once      PHYSICAL EXAM:   Vital Signs Last 24 Hrs  T(C): 36.7 (14 Nov 2022 13:00), Max: 37.2 (13 Nov 2022 17:04)  T(F): 98 (14 Nov 2022 13:00), Max: 98.9 (13 Nov 2022 17:04)  HR: 78 (14 Nov 2022 13:00) (62 - 78)  BP: 103/62 (14 Nov 2022 13:00) (93/55 - 136/92)  BP(mean): 103 (13 Nov 2022 16:41) (85 - 113)  RR: 18 (14 Nov 2022 13:00) (16 - 19)  SpO2: 97% (14 Nov 2022 13:00) (96% - 100%)    Parameters below as of 14 Nov 2022 13:00  Patient On (Oxygen Delivery Method): room air       PENDING EXAM 11/14/22  General: No acute distress      NEUROLOGICAL EXAM:  Mental status: Awake, alert, oriented x3, speech fluent, follows commands, no neglect, normal memory   Cranial Nerves: No facial asymmetry, no nystagmus, no dysarthria,  tongue midline  Motor exam: Normal tone, no drift, 5/5 RUE, 5/5 RLE, 5/5 LUE, 5/5 LLE, normal fine finger movements.  Sensation: Intact to light touch   Coordination/ Gait: No dysmetria, gait not tested    LABS:                        10.7   7.88  )-----------( 232      ( 13 Nov 2022 02:31 )             33.0    11-13    141  |  107  |  23.6<H>  ----------------------------<  95  4.3   |  25.0  |  0.99    Ca    8.3<L>      13 Nov 2022 02:31  Phos  3.6     11-13  Mg     1.9     11-13    PT/INR - ( 14 Nov 2022 04:42 )   PT: 25.7 sec;   INR: 2.20 ratio         PTT - ( 13 Nov 2022 02:31 )  PTT:39.9 sec      IMAGING: Reviewed by me.       CT Head 11/11/22:  Intravascular contrast present on the study from prior CTA.  Contrast is noted within the previously occluded right MCA M1 segment,   suggesting interval resolution of thrombosis.  No acute transcortical infarct or gross intracranial hemorrhage   identified.    CT angiography neck: No hemodynamically significant stenosis of the   bilateral cervical ICAs using NASCET criteria.  Patent vertebral   arteries.  No evidence of vascular dissection.    CT angiography brain: Occlusion of the distal right supraclinoid ICA and   right proximal M1 and A1 segments. Separately, there is occlusion of a   right distal M1/proximal M2 branch.    IR Neuro (11.11.22 @ 12:47)   1. Occlusion of the right ICA terminus.    2. Successful TICI 2b revascularization of the right internal carotid and   middle cerebral arteries utilizing mechanical thrombectomy and   intra-arterial thrombolysis with residual small M4 branch occlusions of   the left frontal and parietal lobes.     TTE 11.11.22   1. Left ventricular ejection fraction, by visual estimation, is 60 to   65%.   2. LV Ejection Fraction by Serrano's Method with a biplane EF of 62 %.   3. Normal global left ventricular systolic function.   4. The mitral in-flow pattern reveals no discernable A-wave, therefore   no comment on diastolic function can be made.   5. Rheumatic mitral valve. Mild mitral regurgitation. Moderate mitral   stenosis. MV MG 8.4 mmHg. Rhythm strip demonstrates Atrial flutter with   variable AV Block during the study. Consider to obtain limited FU study   when heart rate controlled to further evaluate mitral stenosis severity   if clinically indicated.   6. Mild aortic regurgitation.   7. Estimated pulmonary artery systolic pressure is 39.5 mmHg assuming a   right atrial pressure of 3 mmHg, which is consistent with borderline   pulmonary hypertension.       HPI:76 y/o F presents as transfer from [place] as Code Stroke and possible biplane candidate. She was last known well at 10 pm last night, fell in bathroom after walking in herself, at which point  noticed left sided weakness. She was taken to [facility] where CT demonstrated a hyper-dense right mCA - patient's INR was subtherapeutic, not a tPA candidate reported last seen well 10pm 11/10/22. Per  with Mandarin , the patient fell in the bathroom at 730am this morning, went to the bathroom twice last night, but he was asleep and wasn't sure if she was normal during that time or not.  NIH 11 Pre-MRS 0     SUBJECTIVE: No events overnight.  No new neurologic complaints.  ROS reported negative unless otherwise noted.    aspirin  chewable 81 milliGRAM(s) Oral daily  atorvastatin 20 milliGRAM(s) Oral at bedtime  pantoprazole    Tablet 40 milliGRAM(s) Oral before breakfast  warfarin 3 milliGRAM(s) Oral once      PHYSICAL EXAM:   Vital Signs Last 24 Hrs  T(C): 36.7 (14 Nov 2022 13:00), Max: 37.2 (13 Nov 2022 17:04)  T(F): 98 (14 Nov 2022 13:00), Max: 98.9 (13 Nov 2022 17:04)  HR: 78 (14 Nov 2022 13:00) (62 - 78)  BP: 103/62 (14 Nov 2022 13:00) (93/55 - 136/92)  BP(mean): 103 (13 Nov 2022 16:41) (85 - 113)  RR: 18 (14 Nov 2022 13:00) (16 - 19)  SpO2: 97% (14 Nov 2022 13:00) (96% - 100%)    Parameters below as of 14 Nov 2022 13:00  Patient On (Oxygen Delivery Method): room air       PENDING EXAM 11/14/22  General: No acute distress      NEUROLOGICAL EXAM:  Mental status: Awake, alert, oriented x3, speech fluent, follows commands, no neglect, normal memory   Cranial Nerves: No facial asymmetry, no nystagmus, no dysarthria,  tongue midline  Motor exam: Normal tone, no drift, 5/5 RUE, 5/5 RLE, 5/5 LUE, 5/5 LLE, normal fine finger movements.  Sensation: Intact to light touch   Coordination/ Gait: No dysmetria, gait not tested    LABS:                        10.7   7.88  )-----------( 232      ( 13 Nov 2022 02:31 )             33.0    11-13    141  |  107  |  23.6<H>  ----------------------------<  95  4.3   |  25.0  |  0.99    Ca    8.3<L>      13 Nov 2022 02:31  Phos  3.6     11-13  Mg     1.9     11-13    PT/INR - ( 14 Nov 2022 04:42 )   PT: 25.7 sec;   INR: 2.20 ratio         PTT - ( 13 Nov 2022 02:31 )  PTT:39.9 sec      IMAGING: Reviewed by me.       CT Head 11/11/22:  Intravascular contrast present on the study from prior CTA.  Contrast is noted within the previously occluded right MCA M1 segment,   suggesting interval resolution of thrombosis.  No acute transcortical infarct or gross intracranial hemorrhage   identified.    CT angiography neck: No hemodynamically significant stenosis of the   bilateral cervical ICAs using NASCET criteria.  Patent vertebral   arteries.  No evidence of vascular dissection.    CT angiography brain: Occlusion of the distal right supraclinoid ICA and   right proximal M1 and A1 segments. Separately, there is occlusion of a   right distal M1/proximal M2 branch.    IR Neuro (11.11.22 @ 12:47)   1. Occlusion of the right ICA terminus.    2. Successful TICI 2b revascularization of the right internal carotid and   middle cerebral arteries utilizing mechanical thrombectomy and   intra-arterial thrombolysis with residual small M4 branch occlusions of   the left frontal and parietal lobes.     TTE 11.11.22   1. Left ventricular ejection fraction, by visual estimation, is 60 to   65%.   2. LV Ejection Fraction by Serrano's Method with a biplane EF of 62 %.   3. Normal global left ventricular systolic function.   4. The mitral in-flow pattern reveals no discernable A-wave, therefore   no comment on diastolic function can be made.   5. Rheumatic mitral valve. Mild mitral regurgitation. Moderate mitral   stenosis. MV MG 8.4 mmHg. Rhythm strip demonstrates Atrial flutter with   variable AV Block during the study. Consider to obtain limited FU study   when heart rate controlled to further evaluate mitral stenosis severity   if clinically indicated.   6. Mild aortic regurgitation.   7. Estimated pulmonary artery systolic pressure is 39.5 mmHg assuming a   right atrial pressure of 3 mmHg, which is consistent with borderline   pulmonary hypertension.

## 2022-11-14 NOTE — DISCHARGE NOTE PROVIDER - PROVIDER TOKENS
PROVIDER:[TOKEN:[[id number]]],FREE:[LAST:[Own Cardiologist],PHONE:[(   )    -],FAX:[(   )    -]]

## 2022-11-14 NOTE — PROGRESS NOTE ADULT - SUBJECTIVE AND OBJECTIVE BOX
[facility] Division of Hospital Medicine    Chief Complaint: CVA     SUBJECTIVE / OVERNIGHT EVENTS:  Met with pt and  at bedside   Mandarin  used for this encounter     Patient denies chest pain, SOB, abd pain, N/V, fever, chills, dysuria or any other complaints. All remainder ROS negative.     MEDICATIONS  (STANDING):  aspirin  chewable 81 milliGRAM(s) Oral daily  atorvastatin 20 milliGRAM(s) Oral at bedtime  pantoprazole    Tablet 40 milliGRAM(s) Oral before breakfast  warfarin 3 milliGRAM(s) Oral once    MEDICATIONS  (PRN):        I&O's Summary    13 Nov 2022 07:01  -  14 Nov 2022 07:00  --------------------------------------------------------  IN: 300 mL / OUT: 0 mL / NET: 300 mL        PHYSICAL EXAM:  Vital Signs Last 24 Hrs  T(C): 36.3 (14 Nov 2022 10:05), Max: 37.2 (13 Nov 2022 17:04)  T(F): 97.4 (14 Nov 2022 10:05), Max: 98.9 (13 Nov 2022 17:04)  HR: 76 (14 Nov 2022 10:05) (62 - 78)  BP: 93/55 (14 Nov 2022 10:05) (93/55 - 136/92)  BP(mean): 103 (13 Nov 2022 16:41) (74 - 113)  RR: 18 (14 Nov 2022 10:05) (16 - 20)  SpO2: 96% (14 Nov 2022 10:05) (96% - 100%)    Parameters below as of 14 Nov 2022 10:05  Patient On (Oxygen Delivery Method): room air          CONSTITUTIONAL: NAD, sitting up in bed   ENMT: Moist oral mucosa, EOMI   RESPIRATORY: Normal respiratory effort; lungs are clear to auscultation bilaterally   CARDIOVASCULAR: Irregular rate and rhythm, normal S1 and S2, No lower extremity edema   ABDOMEN: Nontender to palpation, normoactive bowel sounds   MUSCULOSKELETAL:  No clubbing or cyanosis of digits   PSYCH: A+O to person, place, and time; affect appropriate  NEUROLOGY: No gross sensory or motor deficits   SKIN: No rashes; warm and dry       LABS:                        10.7   7.88  )-----------( 232      ( 13 Nov 2022 02:31 )             33.0     11-13    141  |  107  |  23.6<H>  ----------------------------<  95  4.3   |  25.0  |  0.99    Ca    8.3<L>      13 Nov 2022 02:31  Phos  3.6     11-13  Mg     1.9     11-13      PT/INR - ( 14 Nov 2022 04:42 )   PT: 25.7 sec;   INR: 2.20 ratio         PTT - ( 13 Nov 2022 02:31 )  PTT:39.9 sec

## 2022-11-14 NOTE — DISCHARGE NOTE PROVIDER - HOSPITAL COURSE
78 y/o F presents as transfer from [place] as code stroke with hx of a-fib on coumadin, she recent stopped taking for dental procedure for 15 days. Patient presented with stroke like symptoms and CT head with acute infarct. She underwent thrombectomy with neuro IR and admitted to Neuro ICU. Now medically improved and without deficit, NIHSS of 0. Transferred to medical floor under medicine.   Acute ischemic CVA - Acute infarct involving the right basal ganglia region s/p thrombectomy of R ICA. Pt was started on aspirin and continue warfarin for atrial fib. Pt to follow up with Neurologist and cardiologist in [place] Dr. Kirk Hansen.   A-fib / valvular disease (rheumatic valve disease) with PPM - rate controlled, cardiology consult appreciated and rec to cont warfarin daily. Echo with normal EF and mild MR with moderate MS  Prerenal azotemia - resolved

## 2022-12-07 PROBLEM — I63.9 CEREBRAL INFARCTION, UNSPECIFIED: Chronic | Status: ACTIVE | Noted: 2022-11-14

## 2022-12-07 PROBLEM — R01.1 CARDIAC MURMUR, UNSPECIFIED: Chronic | Status: ACTIVE | Noted: 2022-11-14

## 2022-12-07 PROBLEM — I48.91 UNSPECIFIED ATRIAL FIBRILLATION: Chronic | Status: ACTIVE | Noted: 2022-11-14

## 2022-12-16 ENCOUNTER — APPOINTMENT (OUTPATIENT)
Dept: NEUROLOGY | Facility: CLINIC | Age: 77
End: 2022-12-16

## 2022-12-16 VITALS
HEIGHT: 62 IN | SYSTOLIC BLOOD PRESSURE: 131 MMHG | WEIGHT: 112 LBS | BODY MASS INDEX: 20.61 KG/M2 | HEART RATE: 80 BPM | TEMPERATURE: 98 F | DIASTOLIC BLOOD PRESSURE: 83 MMHG | OXYGEN SATURATION: 98 %

## 2022-12-16 DIAGNOSIS — Z79.01 LONG TERM (CURRENT) USE OF ANTICOAGULANTS: ICD-10-CM

## 2022-12-16 DIAGNOSIS — I48.91 UNSPECIFIED ATRIAL FIBRILLATION: ICD-10-CM

## 2022-12-16 DIAGNOSIS — I63.411 CEREBRAL INFARCTION DUE TO EMBOLISM OF RIGHT MIDDLE CEREBRAL ARTERY: ICD-10-CM

## 2022-12-16 DIAGNOSIS — I05.0 RHEUMATIC MITRAL STENOSIS: ICD-10-CM

## 2022-12-16 DIAGNOSIS — M81.0 AGE-RELATED OSTEOPOROSIS W/OUT CURRENT PATHOLOGICAL FRACTURE: ICD-10-CM

## 2022-12-16 PROCEDURE — 99215 OFFICE O/P EST HI 40 MIN: CPT

## 2022-12-16 RX ORDER — DENOSUMAB 60 MG/ML
60 INJECTION SUBCUTANEOUS
Refills: 0 | Status: ACTIVE | COMMUNITY
Start: 2022-12-16

## 2022-12-16 RX ORDER — WARFARIN 5 MG/1
5 TABLET ORAL
Refills: 0 | Status: ACTIVE | COMMUNITY
Start: 2022-12-16

## 2022-12-16 NOTE — PHYSICAL EXAM
[FreeTextEntry1] : GENERAL APPEARANCE:\par Well developed, well nourished Chinese speaking woman in no acute distress.\par CARDIOVASCULAR:\par Regular rate and rhythm \par  \par NEUROLOGIC EXAM:\par MENTAL STATUS:\par Alert and Oriented to person, place and time.\par Speech is fluent, without aphasia or dysarthria\par Behavior and affect appropriate to situation.                        \par CRANIAL NERVES:\par CN 2:    Visual fields are full to confrontation.\par CN 3, 4, 6: Extraocular movements are intact. No nystagmus or ophthalmoplegia is evident. Pupils are equally round and reactive to light.\par CN 5:     Facial sensation is intact to light touch in all 3 divisions\par CN 7:     Facial excursion is full and symmetric bilaterally.\par MOTOR:\par Strength is 5/5 throughout for age and stature.\par No orbiting or drift noted.\par SENSORY:\par Intact to light touch perception in all four extremities without extinction to double simultaneous stimulation\par COORD:\par Finger to nose testing without dysmetria bilaterally.\par GAIT:\par Normal station and mildly wobbly gait.  She is able to ambulate without assistive device.\par \par

## 2022-12-16 NOTE — HISTORY OF PRESENT ILLNESS
[FreeTextEntry1] : The patient's son accompanies her today and translates for her.\par \par She is home residing with her .\par She is able to walk without walker or assistive device. \par She is tolerating warfarin without bleeding issues reported.\par Her last INR was 11/29/2022 and was 1.1.  She has not heard any instructions to adjust her warfarin.\par She takes warfarin 2.5 mg every other day alternating with 2 mg every other day.\par \par She has completed as much outpatient physical therapy as her insurance allows. \par No falls, no recurrent weakness or numbness or visual problems. \par Rare headaches and balance is fairly good. \par \par She is able to perform activities of daily living herself including cleaning, cooking, bathing, personal hygiene, dressing and grooming.\par She doesn't drive normally. \par

## 2022-12-16 NOTE — ASSESSMENT
[FreeTextEntry1] : 77 year old woman with atrial fibrillation on warfarin with PPM who was admitted to [facility] as a transfer from [facility] 11/11/2022 after suffering fall with left hemiparesis found to have right ICA terminus occlusion, out of window for IV tPA, subtherapeutic INR 1.55, who underwent successful mechanical thrombectomy and good functional recovery.\par \par Last INR recorded in our system was on 11/29/2022 and it was 1.1.\par Patient reports that her "doctor" prescribes and manages her warfarin.  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  She was therefore continued on warfarin therapy as opposed to changing to a DOAC.  She will follow-up with her cardiologist for her mitral valve stenosis.  She currently denies any shortness of breath nor demonstrates any signs of active heart failure at this time.\par \par PLAN:\par 1. Continue warfarin with goal INR 2.0-3.0 \par 2. Continue home exercise and fall precautions. \par 3. Follow up with cardiology\par 4.  Obtain stat INR now.  We will forward results to patient's PCP/cardiologist once completed. \par \par Patient was educated about signs and symptoms of stroke and was counseled to contact 911 upon emergence of strokelike symptoms. Intravenous thrombolysis and mechanical thrombectomy was also counseled and educated to the patient today.\par \par \par \par

## 2023-01-27 ENCOUNTER — INPATIENT (INPATIENT)
Facility: HOSPITAL | Age: 78
LOS: 5 days | Discharge: HOME | DRG: 378 | End: 2023-02-02
Attending: STUDENT IN AN ORGANIZED HEALTH CARE EDUCATION/TRAINING PROGRAM | Admitting: STUDENT IN AN ORGANIZED HEALTH CARE EDUCATION/TRAINING PROGRAM
Payer: COMMERCIAL

## 2023-01-27 VITALS
RESPIRATION RATE: 18 BRPM | HEIGHT: 61 IN | SYSTOLIC BLOOD PRESSURE: 102 MMHG | HEART RATE: 87 BPM | TEMPERATURE: 98 F | OXYGEN SATURATION: 100 % | DIASTOLIC BLOOD PRESSURE: 70 MMHG | WEIGHT: 119.93 LBS

## 2023-01-27 DIAGNOSIS — R79.1 ABNORMAL COAGULATION PROFILE: ICD-10-CM

## 2023-01-27 DIAGNOSIS — Z29.9 ENCOUNTER FOR PROPHYLACTIC MEASURES, UNSPECIFIED: ICD-10-CM

## 2023-01-27 DIAGNOSIS — K92.2 GASTROINTESTINAL HEMORRHAGE, UNSPECIFIED: ICD-10-CM

## 2023-01-27 DIAGNOSIS — R79.89 OTHER SPECIFIED ABNORMAL FINDINGS OF BLOOD CHEMISTRY: ICD-10-CM

## 2023-01-27 DIAGNOSIS — Z95.0 PRESENCE OF CARDIAC PACEMAKER: Chronic | ICD-10-CM

## 2023-01-27 LAB
ALBUMIN SERPL ELPH-MCNC: 2.9 G/DL — LOW (ref 3.3–5)
ALP SERPL-CCNC: 104 U/L — SIGNIFICANT CHANGE UP (ref 30–120)
ALT FLD-CCNC: 17 U/L DA — SIGNIFICANT CHANGE UP (ref 10–60)
ANION GAP SERPL CALC-SCNC: 7 MMOL/L — SIGNIFICANT CHANGE UP (ref 5–17)
APTT BLD: 50.3 SEC — HIGH (ref 27.5–35.5)
AST SERPL-CCNC: 17 U/L — SIGNIFICANT CHANGE UP (ref 10–40)
BASOPHILS # BLD AUTO: 0.02 K/UL — SIGNIFICANT CHANGE UP (ref 0–0.2)
BASOPHILS NFR BLD AUTO: 0.2 % — SIGNIFICANT CHANGE UP (ref 0–2)
BILIRUB SERPL-MCNC: 0.3 MG/DL — SIGNIFICANT CHANGE UP (ref 0.2–1.2)
BLD GP AB SCN SERPL QL: SIGNIFICANT CHANGE UP
BUN SERPL-MCNC: 60 MG/DL — HIGH (ref 7–23)
CALCIUM SERPL-MCNC: 9.1 MG/DL — SIGNIFICANT CHANGE UP (ref 8.4–10.5)
CHLORIDE SERPL-SCNC: 106 MMOL/L — SIGNIFICANT CHANGE UP (ref 96–108)
CO2 SERPL-SCNC: 28 MMOL/L — SIGNIFICANT CHANGE UP (ref 22–31)
CREAT SERPL-MCNC: 0.94 MG/DL — SIGNIFICANT CHANGE UP (ref 0.5–1.3)
EGFR: 62 ML/MIN/1.73M2 — SIGNIFICANT CHANGE UP
EOSINOPHIL # BLD AUTO: 0.06 K/UL — SIGNIFICANT CHANGE UP (ref 0–0.5)
EOSINOPHIL NFR BLD AUTO: 0.7 % — SIGNIFICANT CHANGE UP (ref 0–6)
GLUCOSE SERPL-MCNC: 111 MG/DL — HIGH (ref 70–99)
HCT VFR BLD CALC: 29.1 % — LOW (ref 34.5–45)
HCT VFR BLD CALC: 32.8 % — LOW (ref 34.5–45)
HGB BLD-MCNC: 10.6 G/DL — LOW (ref 11.5–15.5)
HGB BLD-MCNC: 9.5 G/DL — LOW (ref 11.5–15.5)
IMM GRANULOCYTES NFR BLD AUTO: 0.4 % — SIGNIFICANT CHANGE UP (ref 0–0.9)
INR BLD: 4.93 RATIO — HIGH (ref 0.88–1.16)
LYMPHOCYTES # BLD AUTO: 0.96 K/UL — LOW (ref 1–3.3)
LYMPHOCYTES # BLD AUTO: 11.4 % — LOW (ref 13–44)
MCHC RBC-ENTMCNC: 28.5 PG — SIGNIFICANT CHANGE UP (ref 27–34)
MCHC RBC-ENTMCNC: 32.3 GM/DL — SIGNIFICANT CHANGE UP (ref 32–36)
MCV RBC AUTO: 88.2 FL — SIGNIFICANT CHANGE UP (ref 80–100)
MONOCYTES # BLD AUTO: 0.5 K/UL — SIGNIFICANT CHANGE UP (ref 0–0.9)
MONOCYTES NFR BLD AUTO: 5.9 % — SIGNIFICANT CHANGE UP (ref 2–14)
NEUTROPHILS # BLD AUTO: 6.84 K/UL — SIGNIFICANT CHANGE UP (ref 1.8–7.4)
NEUTROPHILS NFR BLD AUTO: 81.4 % — HIGH (ref 43–77)
NRBC # BLD: 0 /100 WBCS — SIGNIFICANT CHANGE UP (ref 0–0)
OB PNL STL: POSITIVE
PLATELET # BLD AUTO: 322 K/UL — SIGNIFICANT CHANGE UP (ref 150–400)
POTASSIUM SERPL-MCNC: 4.4 MMOL/L — SIGNIFICANT CHANGE UP (ref 3.5–5.3)
POTASSIUM SERPL-SCNC: 4.4 MMOL/L — SIGNIFICANT CHANGE UP (ref 3.5–5.3)
PROT SERPL-MCNC: 6.7 G/DL — SIGNIFICANT CHANGE UP (ref 6–8.3)
PROTHROM AB SERPL-ACNC: 57.6 SEC — HIGH (ref 10.5–13.4)
RBC # BLD: 3.72 M/UL — LOW (ref 3.8–5.2)
RBC # FLD: 13.4 % — SIGNIFICANT CHANGE UP (ref 10.3–14.5)
SARS-COV-2 RNA SPEC QL NAA+PROBE: SIGNIFICANT CHANGE UP
SODIUM SERPL-SCNC: 141 MMOL/L — SIGNIFICANT CHANGE UP (ref 135–145)
TROPONIN I, HIGH SENSITIVITY RESULT: 20.7 NG/L — SIGNIFICANT CHANGE UP
WBC # BLD: 8.41 K/UL — SIGNIFICANT CHANGE UP (ref 3.8–10.5)
WBC # FLD AUTO: 8.41 K/UL — SIGNIFICANT CHANGE UP (ref 3.8–10.5)

## 2023-01-27 PROCEDURE — 99221 1ST HOSP IP/OBS SF/LOW 40: CPT

## 2023-01-27 PROCEDURE — 99223 1ST HOSP IP/OBS HIGH 75: CPT

## 2023-01-27 PROCEDURE — 71045 X-RAY EXAM CHEST 1 VIEW: CPT | Mod: 26

## 2023-01-27 PROCEDURE — 99285 EMERGENCY DEPT VISIT HI MDM: CPT

## 2023-01-27 PROCEDURE — 93010 ELECTROCARDIOGRAM REPORT: CPT

## 2023-01-27 RX ORDER — SODIUM CHLORIDE 9 MG/ML
500 INJECTION INTRAMUSCULAR; INTRAVENOUS; SUBCUTANEOUS ONCE
Refills: 0 | Status: COMPLETED | OUTPATIENT
Start: 2023-01-27 | End: 2023-01-27

## 2023-01-27 RX ORDER — PANTOPRAZOLE SODIUM 20 MG/1
8 TABLET, DELAYED RELEASE ORAL
Qty: 80 | Refills: 0 | Status: DISCONTINUED | OUTPATIENT
Start: 2023-01-27 | End: 2023-01-31

## 2023-01-27 RX ORDER — WARFARIN SODIUM 2.5 MG/1
0 TABLET ORAL
Qty: 0 | Refills: 0 | DISCHARGE

## 2023-01-27 RX ORDER — PANTOPRAZOLE SODIUM 20 MG/1
40 TABLET, DELAYED RELEASE ORAL ONCE
Refills: 0 | Status: COMPLETED | OUTPATIENT
Start: 2023-01-27 | End: 2023-01-27

## 2023-01-27 RX ORDER — SODIUM CHLORIDE 9 MG/ML
1000 INJECTION, SOLUTION INTRAVENOUS ONCE
Refills: 0 | Status: COMPLETED | OUTPATIENT
Start: 2023-01-27 | End: 2023-01-27

## 2023-01-27 RX ORDER — DENOSUMAB 60 MG/ML
0 INJECTION SUBCUTANEOUS
Qty: 0 | Refills: 0 | DISCHARGE

## 2023-01-27 RX ORDER — SUCRALFATE 1 G
1 TABLET ORAL EVERY 6 HOURS
Refills: 0 | Status: DISCONTINUED | OUTPATIENT
Start: 2023-01-27 | End: 2023-02-02

## 2023-01-27 RX ADMIN — SODIUM CHLORIDE 500 MILLILITER(S): 9 INJECTION INTRAMUSCULAR; INTRAVENOUS; SUBCUTANEOUS at 17:59

## 2023-01-27 RX ADMIN — PANTOPRAZOLE SODIUM 10 MG/HR: 20 TABLET, DELAYED RELEASE ORAL at 21:32

## 2023-01-27 RX ADMIN — SODIUM CHLORIDE 1000 MILLILITER(S): 9 INJECTION, SOLUTION INTRAVENOUS at 21:14

## 2023-01-27 RX ADMIN — PANTOPRAZOLE SODIUM 40 MILLIGRAM(S): 20 TABLET, DELAYED RELEASE ORAL at 16:59

## 2023-01-27 RX ADMIN — PANTOPRAZOLE SODIUM 40 MILLIGRAM(S): 20 TABLET, DELAYED RELEASE ORAL at 21:36

## 2023-01-27 RX ADMIN — Medication 1 GRAM(S): at 23:08

## 2023-01-27 RX ADMIN — SODIUM CHLORIDE 500 MILLILITER(S): 9 INJECTION INTRAMUSCULAR; INTRAVENOUS; SUBCUTANEOUS at 16:59

## 2023-01-27 NOTE — H&P ADULT - NSHPLABSRESULTS_GEN_ALL_CORE
-                   10.6   8.41   )----------(  322       ( 27 Jan 2023 15:59 )               32.8        141    |  106    |  60     ----------------------------<  111        ( 27 Jan 2023 15:59 )  4.4     |  28     |  0.94     Ca    9.1        ( 27 Jan 2023 15:59 )    TPro  6.7    /  Alb  2.9    /  TBili  0.3    /  DBili  x      /  AST  17     /  ALT  17     /  AlkPhos  104    ( 27 Jan 2023 15:59 )    LIVER FUNCTIONS - ( 27 Jan 2023 15:59 )  Alb: 2.9 g/dL / Pro: 6.7 g/dL / ALK PHOS: 104 U/L / ALT: 17 U/L DA / AST: 17 U/L / GGT: x           PT/INR -  57.6 sec / 4.93 ratio   ( 27 Jan 2023 15:59 )  PTT -  50.3 sec   ( 27 Jan 2023 15:59 )    COVID-19 PCR: NotDetec (27 Jan 2023 15:59)      Occult Blood, Feces (01.27.23 @ 15:59)   Occult Blood, Feces: Positive.    Troponin I, High Sensitivity (01.27.23 @ 16:03)   Troponin I, High Sensitivity Result: 20.7.      XR CHEST PORTABLE ROUTINE   PROCEDURE DATE:  01/27/2023    INTERPRETATION:  Portable chest radiograph  CLINICAL INFORMATION: Rectal bleeding  TECHNIQUE:  Portable  AP chest radiograph.  COMPARISON: 11/11/2022 chest x-ray .  FINDINGS:  CATHETERS AND TUBES: None  PULMONARY: The visualized lungs are clear of airspace consolidations or   pleural effusions.   No pneumothorax.  HEART/VASCULAR: The  heart is enlarged in transverse diameter. Micra   Transcatheter Pacing System cardiac  device within RIGHT ventricle.  .  BONES: Visualized osseous structures are intact.  IMPRESSION:   No radiographic evidence of active chest disease.  Personally reviewed by me.        EKG:    As per my review shows A. Fib with CVR at 80/min, LVH with 2ry repolarization abnormality, normal QRS duration & axis (+75), with normal transition.      -

## 2023-01-27 NOTE — H&P ADULT - PROBLEM SELECTOR PLAN 3
BUN/Cr of 60/0.94 ML 2ry to nitrogen load related to upper GI bleed, also hypovolemia 2ry to acute blood loss, received 500 ml NS bolus by ED team earlier, gave additional 1000 ml of LR bolus on admission, cautious IVF hydration given her moderate MS, monitor BUN/Cr.

## 2023-01-27 NOTE — ED PROVIDER NOTE - CLINICAL SUMMARY MEDICAL DECISION MAKING FREE TEXT BOX
Family  per patient request  Patient brought in by family for melena and near syncope this afternoon.  No fever chest pain abdominal pain nausea vomiting.  Patient on Coumadin for A. fib.    Plan EKG chest x-ray labs IV fluid Protonix   Differential including but not limited to GI bleed anemia dehydration electrolyte abnormality arrhythmia

## 2023-01-27 NOTE — ED PROVIDER NOTE - DIFFERENTIAL DIAGNOSIS
Differential including but not limited to GI bleed anemia dehydration electrolyte abnormality arrhythmia Differential Diagnosis

## 2023-01-27 NOTE — CONSULT NOTE ADULT - SUBJECTIVE AND OBJECTIVE BOX
PAST MEDICAL & SURGICAL HISTORY:  Pyelonephritis  Heart murmur after rheumatic heart disease  Atrial fibrillation  Stroke  Pacemaker      Allergies:  No Known Allergies      Social History:    FAMILY HISTORY:  Cerebrovascular accident (CVA) (Father)  Cerebrovascular accident (CVA) (Sibling)        ICU Vital Signs Last 24 Hrs  T(C): 36.4 (27 Jan 2023 16:05), Max: 36.4 (27 Jan 2023 16:05)  T(F): 97.6 (27 Jan 2023 16:05), Max: 97.6 (27 Jan 2023 16:05)  HR: 77 (27 Jan 2023 19:13) (70 - 87)  BP: 109/63 (27 Jan 2023 19:13) (92/64 - 109/63)  BP(mean): 68 (27 Jan 2023 18:55) (68 - 68)  ABP: --  ABP(mean): --  RR: 15 (27 Jan 2023 19:13) (15 - 18)  SpO2: 99% (27 Jan 2023 19:13) (99% - 100%)    O2 Parameters below as of 27 Jan 2023 19:13  Patient On (Oxygen Delivery Method): room air                         10.6   8.41  )-----------( 322      ( 27 Jan 2023 15:59 )             32.8       01-27    141  |  106  |  60<H>  ----------------------------<  111<H>  4.4   |  28  |  0.94    Ca    9.1      27 Jan 2023 15:59    PT/INR - ( 27 Jan 2023 15:59 )   PT: 57.6 sec;   INR: 4.93 ratio       PTT - ( 27 Jan 2023 15:59 )  PTT:50.3 sec    TPro  6.7  /  Alb  2.9<L>  /  TBili  0.3  /  DBili  x   /  AST  17  /  ALT  17  /  AlkPhos  104  01-27         76 y/o woman Mandarin speaking, with hx of Afib, CVA (on warfarin), s/p pacemaker who was brought in by her son with complaints of feeling dizzy and passing black stool.  History obtained by her son who lives with her.  As per son, patient was in usual state of health until taking a shower at 1 pm today - where she felt dizzy, nausea, diaphoretic - as per son she managed to get out of the shower and then later in the day at 3 pm, she had an urge to have a BM - patients  noted that her stool was soft and black in color.  Last bowel movement was at 3 pm today.   Patient seen feeling well now and denies having abdominal pain, nausea, vomiting, fevers, chills, constipation, diarrhea, dizziness, chest pain, sob, palpitations.  She says she had an upper endoscopy a year ago and son says she was found to have gastritis.  She had a colonoscopy 5 years ago - unremarkable as per son.  Denies NSAID use, son says she manages her arthritis pain was Tylenol.    Denies GI cancers in family.         All other ROS negative.     PAST MEDICAL & SURGICAL HISTORY:  Pyelonephritis  Heart murmur after rheumatic heart disease  Atrial fibrillation  Stroke  s/p Pacemaker    Allergies:  No Known Allergies    Social History:  No tobacco, no ETOH - lives with son    FAMILY HISTORY:  Cerebrovascular accident (CVA) (Father)  Cerebrovascular accident (CVA) (Sibling)    Home Medications:  Warfarin     ICU Vital Signs Last 24 Hrs  T(C): 36.4 (27 Jan 2023 16:05), Max: 36.4 (27 Jan 2023 16:05)  T(F): 97.6 (27 Jan 2023 16:05), Max: 97.6 (27 Jan 2023 16:05)  HR: 77 (27 Jan 2023 19:13) (70 - 87)  BP: 109/63 (27 Jan 2023 19:13) (92/64 - 109/63)  BP(mean): 68 (27 Jan 2023 18:55) (68 - 68)  ABP: --  ABP(mean): --  RR: 15 (27 Jan 2023 19:13) (15 - 18)  SpO2: 99% (27 Jan 2023 19:13) (99% - 100%)    O2 Parameters below as of 27 Jan 2023 19:13  Patient On (Oxygen Delivery Method): room air      HEENT:  NCAT  Lung:  CTA b/l  CV:  Nml s1 and s2  Abd:  Soft, +BS, NT, ND  Rectal exam:  as per ER physician irving  Ext:  no e/c/c                 10.6   8.41  )-----------( 322      ( 27 Jan 2023 15:59 )             32.8       01-27    141  |  106  |  60<H>  ----------------------------<  111<H>  4.4   |  28  |  0.94    Ca    9.1      27 Jan 2023 15:59    PT/INR - ( 27 Jan 2023 15:59 )   PT: 57.6 sec;   INR: 4.93 ratio       PTT - ( 27 Jan 2023 15:59 )  PTT:50.3 sec    TPro  6.7  /  Alb  2.9<L>  /  TBili  0.3  /  DBili  x   /  AST  17  /  ALT  17  /  AlkPhos  104  01-27

## 2023-01-27 NOTE — ED ADULT NURSE NOTE - NSIMPLEMENTINTERV_GEN_ALL_ED
Implemented All Fall with Harm Risk Interventions:  [place] to call system. Call bell, personal items and telephone within reach. Instruct patient to call for assistance. Room bathroom lighting operational. Non-slip footwear when patient is off stretcher. Physically safe environment: no spills, clutter or unnecessary equipment. Stretcher in lowest position, wheels locked, appropriate side rails in place. Provide visual cue, wrist band, yellow gown, etc. Monitor gait and stability. Monitor for mental status changes and reorient to person, place, and time. Review medications for side effects contributing to fall risk. Reinforce activity limits and safety measures with patient and family. Provide visual clues: red socks.

## 2023-01-27 NOTE — H&P ADULT - NSHPREVIEWOFSYSTEMS_GEN_ALL_CORE
-    CONSTITUTIONAL: No fever or chills.  EYES: No eye pain, visual disturbances, or discharge.  ENMT:  No difficulty hearing, vertigo, sinus or throat pain.  NECK: No pain or stiffness.	  RESPIRATORY: No cough, wheezing, or hemoptysis; No shortness of breath.  CARDIOVASCULAR: No chest pain, palpitations, or leg swelling, (+) dizziness.  GASTROINTESTINAL: No abdominal pain, (+) nausea, no vomiting, or hematemesis; No diarrhea, (+) melena, no hematochezia.  GENITOURINARY: No dysuria, frequency, hematuria, or incontinence.  NEUROLOGICAL: No headaches, focal muscle weakness, numbness, or tremors.  SKIN: No itching, burning or rashes.  MUSCULOSKELETAL: No joint swelling or pain.  PSYCHIATRIC: No depression, anxiety, or agitation.  HEME/LYMPH: No easy bruising, bleeding gums, or nose bleed.  ALLERGY AND IMMUNOLOGIC: No hives or eczema.

## 2023-01-27 NOTE — H&P ADULT - NSHPADDITIONALINFOADULT_GEN_ALL_CORE
Radiologists confirmed results of right finger x-ray which are essentially the same as the [facility] physicians results and patient was notified of these results at time of visit.       Management plan was discussed with patient & son Pierre at the bedside, son was translating as per patient request earlier, they understand & agree.

## 2023-01-27 NOTE — ED ADULT NURSE NOTE - OBJECTIVE STATEMENT
Pt is alert and oriented. Pt arrives to the ER stating that she started having black stools before 15:00 today. As per son, pt went in the shower and had a near syncopal episode. Pt states that she became diaphoretic, sob and dizzy. Pt states that she then exited the shower and walked to the sofa. Pt denies hitting her head or LOC. Pt is on coumadin for afib and was recently treated for a stroke at [facility]. Pt denies sob, chest pain, nausea, vomiting, dizziness and pain. Pt resp are even and unlabored, skin color balta for race. Pt updated on plan of care. Pt placed on cm. Tele monitor made aware.

## 2023-01-27 NOTE — H&P ADULT - ASSESSMENT
78 y/o F with PMH of Dyslipidemia, Rheumatic MS, A. Fib on Coumadin, s/p Micra, CVA 10 weeks ago s/p endovascular therapy, and Osteoporosis, presented with melena.

## 2023-01-27 NOTE — H&P ADULT - PROBLEM SELECTOR PLAN 2
INR of 4.93, hold Coumadin, monitor INR, Cardiology consult with Dr. Cary was called earlier by ED team.

## 2023-01-27 NOTE — H&P ADULT - PROBLEM SELECTOR PLAN 4
already on Coumadin for valvular A. Fib with supra therapeutic INR, currently on hold for active GI bleeding, started her on B/L intermittent pneumatic compression device for mechanical DVT prophylaxis.

## 2023-01-27 NOTE — ED PROVIDER NOTE - OBJECTIVE STATEMENT
Patient is a 77-year-old mandarin speaking female past medical history of A. bharti on Coumadin ischemic stroke here for black stools.  Patient states she had black stool bowel movement at 3 PM today has been feeling very weak and had a near syncopal episode today in shower patient states she lowered herself to the ground and did not pass out denies any head trauma chest pain shortness of breath abdominal pain fever chills numbness tingling weakness.  Patient was recently transferred to [place] for revascularization of the right internal carotid and MCA with conical thrombectomy thrombolysis.  Patient has no residual weakness from the stroke.  pt prefers son translates   pcp Jem dewitt

## 2023-01-27 NOTE — H&P ADULT - NSHPPHYSICALEXAM_GEN_ALL_CORE
-    Vital Signs Last 24 Hrs  T(C): 36.4 (27 Jan 2023 16:05), Max: 36.4 (27 Jan 2023 16:05)  T(F): 97.6 (27 Jan 2023 16:05), Max: 97.6 (27 Jan 2023 16:05)  HR: 74 (27 Jan 2023 21:14) (70 - 87)  BP: 102/64 (27 Jan 2023 21:14) (88/57 - 109/63)  BP(mean): 68 (27 Jan 2023 18:55) (68 - 68)  RR: 15 (27 Jan 2023 21:14) (15 - 18)  SpO2: 99% (27 Jan 2023 21:14) (99% - 100%)    Parameters below as of 27 Jan 2023 21:14  Patient On (Oxygen Delivery Method): room air        PHYSICAL EXAM:  	  GENERAL: NAD, well-groomed, well-developed.  HEAD:  Atraumatic, Norm cephalic.  EYES: PERRLA, conjunctiva clear.  ENMT: no nasal discharge, MMM.   NECK: Supple, No JVD.  NERVOUS SYSTEM:  Alert & oriented X3, minimal right leg weakness (patient is right handed), no facial asymmetry, no slurred speech.  CHEST/LUNG: Good air entry B/L, no rales, rhonchi, or wheezing.  HEART: Sharp variable S1 & acc S2, no murmurs, (+) OS.  ABDOMEN: Soft, non-tender, non-distended; bowel sounds present, no palpable masses or organomegaly.  EXTREMITIES:  No clubbing, cyanosis, or edema.  VASCULAR: 2+ radial, DPA / PTA pulses B/L.  SKIN: No rashes or lesions.  PSYCH: normal affect & behavior.

## 2023-01-27 NOTE — ED ADULT NURSE NOTE - NSICDXPASTMEDICALHX_GEN_ALL_CORE_FT
PAST MEDICAL HISTORY:  Atrial fibrillation     Heart murmur after rheumatic heart disease     Pyelonephritis     Stroke

## 2023-01-27 NOTE — H&P ADULT - HISTORY OF PRESENT ILLNESS
This is a 76 y/o F with PMH of Dyslipidemia, Rheumatic MS, A. Fib on Coumadin, s/p Micra, CVA 10 weeks ago s/p endovascular therapy, and Osteoporosis, who presented with passing black stools. Patient was taking a shower earlier today when she suddenly felt dizzy & weak, was able to manage to walk to the sofa without passing out, was witnessed to be diaphoretic & pale, and around 3 pm she went to the bathroom where she got a liquid BM with black stools, no abdominal pain, reports nausea without vomiting. No history of similar episode in the past but had an endoscopy about a year ago that revealed gastritis. At the ED her INR was 4.93, no reported bleeding from any other source. This is a 76 y/o F with PMH of Dyslipidemia, Rheumatic MS, A. Fib on Coumadin, s/p Micra, CVA 10 weeks ago s/p endovascular therapy, and Osteoporosis, who presented with passing black stools. Patient was taking a shower earlier today when she suddenly felt dizzy & weak, was able to manage to walk to the sofa without passing out, was witnessed to be diaphoretic & pale, and around 3 pm she went to the bathroom where she got a liquid BM with black stools, no abdominal pain, reports nausea without vomiting, no further BMs since then, no history of similar episode in the past but had an endoscopy about a year ago that revealed gastritis. At the ED her INR was 4.93, no reported bleeding from any other source.

## 2023-01-27 NOTE — H&P ADULT - PROBLEM SELECTOR PLAN 1
not using Aspirin or other NSAIDs, H/O Gastritis, no ETOH, never treated for H Pylori, admitted to medicine, NPO except for meds, type & screen, IV fluid bolus, started on Protonix infusion after an IVP loading dose, also started on Carafate 1 gm Q 6h, monitor H & H and transfuse as needed, discussed with patient & son at the bedside, they consented on transfusion, H Pylori Ab, GI consult with Dr. Lin was called earlier by ED team, already saw the patient, possible diagnostic/therapeutic EGD on Monday.

## 2023-01-28 LAB
ANION GAP SERPL CALC-SCNC: 7 MMOL/L — SIGNIFICANT CHANGE UP (ref 5–17)
APTT BLD: 51.3 SEC — HIGH (ref 27.5–35.5)
BASOPHILS # BLD AUTO: 0.01 K/UL — SIGNIFICANT CHANGE UP (ref 0–0.2)
BASOPHILS NFR BLD AUTO: 0.2 % — SIGNIFICANT CHANGE UP (ref 0–2)
BUN SERPL-MCNC: 38 MG/DL — HIGH (ref 7–23)
CALCIUM SERPL-MCNC: 8.5 MG/DL — SIGNIFICANT CHANGE UP (ref 8.4–10.5)
CHLORIDE SERPL-SCNC: 112 MMOL/L — HIGH (ref 96–108)
CO2 SERPL-SCNC: 27 MMOL/L — SIGNIFICANT CHANGE UP (ref 22–31)
CREAT SERPL-MCNC: 0.84 MG/DL — SIGNIFICANT CHANGE UP (ref 0.5–1.3)
EGFR: 71 ML/MIN/1.73M2 — SIGNIFICANT CHANGE UP
EOSINOPHIL # BLD AUTO: 0.09 K/UL — SIGNIFICANT CHANGE UP (ref 0–0.5)
EOSINOPHIL NFR BLD AUTO: 1.7 % — SIGNIFICANT CHANGE UP (ref 0–6)
GLUCOSE SERPL-MCNC: 88 MG/DL — SIGNIFICANT CHANGE UP (ref 70–99)
HCT VFR BLD CALC: 28.5 % — LOW (ref 34.5–45)
HCT VFR BLD CALC: 29.2 % — LOW (ref 34.5–45)
HCT VFR BLD CALC: 29.3 % — LOW (ref 34.5–45)
HGB BLD-MCNC: 9.2 G/DL — LOW (ref 11.5–15.5)
HGB BLD-MCNC: 9.4 G/DL — LOW (ref 11.5–15.5)
HGB BLD-MCNC: 9.5 G/DL — LOW (ref 11.5–15.5)
IMM GRANULOCYTES NFR BLD AUTO: 0.2 % — SIGNIFICANT CHANGE UP (ref 0–0.9)
INR BLD: 4.02 RATIO — HIGH (ref 0.88–1.16)
LYMPHOCYTES # BLD AUTO: 1.41 K/UL — SIGNIFICANT CHANGE UP (ref 1–3.3)
LYMPHOCYTES # BLD AUTO: 26 % — SIGNIFICANT CHANGE UP (ref 13–44)
MCHC RBC-ENTMCNC: 28.8 PG — SIGNIFICANT CHANGE UP (ref 27–34)
MCHC RBC-ENTMCNC: 32.3 GM/DL — SIGNIFICANT CHANGE UP (ref 32–36)
MCV RBC AUTO: 89.1 FL — SIGNIFICANT CHANGE UP (ref 80–100)
MONOCYTES # BLD AUTO: 0.38 K/UL — SIGNIFICANT CHANGE UP (ref 0–0.9)
MONOCYTES NFR BLD AUTO: 7 % — SIGNIFICANT CHANGE UP (ref 2–14)
NEUTROPHILS # BLD AUTO: 3.52 K/UL — SIGNIFICANT CHANGE UP (ref 1.8–7.4)
NEUTROPHILS NFR BLD AUTO: 64.9 % — SIGNIFICANT CHANGE UP (ref 43–77)
NRBC # BLD: 0 /100 WBCS — SIGNIFICANT CHANGE UP (ref 0–0)
PLATELET # BLD AUTO: 290 K/UL — SIGNIFICANT CHANGE UP (ref 150–400)
POTASSIUM SERPL-MCNC: 4.1 MMOL/L — SIGNIFICANT CHANGE UP (ref 3.5–5.3)
POTASSIUM SERPL-SCNC: 4.1 MMOL/L — SIGNIFICANT CHANGE UP (ref 3.5–5.3)
PROTHROM AB SERPL-ACNC: 46.9 SEC — HIGH (ref 10.5–13.4)
RBC # BLD: 3.2 M/UL — LOW (ref 3.8–5.2)
RBC # FLD: 13.7 % — SIGNIFICANT CHANGE UP (ref 10.3–14.5)
SODIUM SERPL-SCNC: 146 MMOL/L — HIGH (ref 135–145)
WBC # BLD: 5.42 K/UL — SIGNIFICANT CHANGE UP (ref 3.8–10.5)
WBC # FLD AUTO: 5.42 K/UL — SIGNIFICANT CHANGE UP (ref 3.8–10.5)

## 2023-01-28 PROCEDURE — 99232 SBSQ HOSP IP/OBS MODERATE 35: CPT

## 2023-01-28 PROCEDURE — 99233 SBSQ HOSP IP/OBS HIGH 50: CPT

## 2023-01-28 RX ORDER — SODIUM CHLORIDE 9 MG/ML
1000 INJECTION, SOLUTION INTRAVENOUS
Refills: 0 | Status: DISCONTINUED | OUTPATIENT
Start: 2023-01-28 | End: 2023-02-01

## 2023-01-28 RX ADMIN — PANTOPRAZOLE SODIUM 10 MG/HR: 20 TABLET, DELAYED RELEASE ORAL at 05:11

## 2023-01-28 RX ADMIN — PANTOPRAZOLE SODIUM 10 MG/HR: 20 TABLET, DELAYED RELEASE ORAL at 17:57

## 2023-01-28 RX ADMIN — Medication 1 GRAM(S): at 17:14

## 2023-01-28 RX ADMIN — Medication 1 GRAM(S): at 11:03

## 2023-01-28 RX ADMIN — Medication 1 GRAM(S): at 05:11

## 2023-01-28 RX ADMIN — SODIUM CHLORIDE 75 MILLILITER(S): 9 INJECTION, SOLUTION INTRAVENOUS at 14:16

## 2023-01-28 NOTE — PROGRESS NOTE ADULT - ASSESSMENT
IMPRESSION:  #  Likely upper GI bleeding - clinically stable (stable vitals, one formed black stool today)  -  Elevated BUN, melena     #  Elevated INR    #  Comorbidities:  Afib, CVA (on warfarin), s/p pacemaker         PLAN:   -  PPI gtt/carafate  -  Check Hpylori serology  -  CBC Q 6 hours - transfuse as needed   -  Continue holding coumadin, monitor INR  -  NPO for now, clear liquid diet tomorrow if continues to be clinically stable  -  Likely EGD on Monday when INR improves    IMPRESSION:  #  Likely upper GI bleeding - clinically stable (stable vitals, one formed black stool today)  -  Elevated BUN, melena     #  Elevated INR    #  Comorbidities:  Afib, CVA (on warfarin), s/p pacemaker         PLAN:   -  PPI gtt/carafate  -  Check Hpylori serology  -  CBC Q 6 hours - transfuse as needed   -  Continue holding coumadin, monitor INR  -  Start clear liquid diet   -  Likely EGD on Monday when INR improves

## 2023-01-28 NOTE — PROGRESS NOTE ADULT - SUBJECTIVE AND OBJECTIVE BOX
MEDICATIONS  (STANDING):  lactated ringers. 1000 milliLiter(s) (75 mL/Hr) IV Continuous <Continuous>  pantoprazole Infusion 8 mG/Hr (10 mL/Hr) IV Continuous <Continuous>  sucralfate 1 Gram(s) Oral every 6 hours            Vital Signs Last 24 Hrs  T(C): 36.6 (28 Jan 2023 13:30), Max: 36.7 (27 Jan 2023 21:14)  T(F): 97.9 (28 Jan 2023 13:30), Max: 98 (27 Jan 2023 21:14)  HR: 71 (28 Jan 2023 13:30) (65 - 93)  BP: 92/59 (28 Jan 2023 13:30) (88/57 - 117/67)  BP(mean): 68 (27 Jan 2023 18:55) (68 - 68)  RR: 15 (28 Jan 2023 13:30) (15 - 18)  SpO2: 96% (28 Jan 2023 13:30) (96% - 100%)    Parameters below as of 28 Jan 2023 13:30  Patient On (Oxygen Delivery Method): room air                                9.5    x     )-----------( x        ( 28 Jan 2023 11:47 )             29.3       01-28    146<H>  |  112<H>  |  38<H>  ----------------------------<  88  4.1   |  27  |  0.84    Ca    8.5      28 Jan 2023 06:00    TPro  6.7  /  Alb  2.9<L>  /  TBili  0.3  /  DBili  x   /  AST  17  /  ALT  17  /  AlkPhos  104  01-27   Patient feels well - offers no complaints -  in room with patient - phone call made to son Pierre.       MEDICATIONS  (STANDING):  lactated ringers. 1000 milliLiter(s) (75 mL/Hr) IV Continuous <Continuous>  pantoprazole Infusion 8 mG/Hr (10 mL/Hr) IV Continuous <Continuous>  sucralfate 1 Gram(s) Oral every 6 hours            Vital Signs Last 24 Hrs  T(C): 36.6 (28 Jan 2023 13:30), Max: 36.7 (27 Jan 2023 21:14)  T(F): 97.9 (28 Jan 2023 13:30), Max: 98 (27 Jan 2023 21:14)  HR: 71 (28 Jan 2023 13:30) (65 - 93)  BP: 92/59 (28 Jan 2023 13:30) (88/57 - 117/67)  BP(mean): 68 (27 Jan 2023 18:55) (68 - 68)  RR: 15 (28 Jan 2023 13:30) (15 - 18)  SpO2: 96% (28 Jan 2023 13:30) (96% - 100%)    Parameters below as of 28 Jan 2023 13:30  Patient On (Oxygen Delivery Method): room air      HEENT:  NCAT  Lung:  CTA b/l  CV:  nml s1 and s2  Abd:  soft, NT, ND  Ext:  no e/c/c                            9.5    x     )-----------( x        ( 28 Jan 2023 11:47 )             29.3       01-28    146<H>  |  112<H>  |  38<H>  ----------------------------<  88  4.1   |  27  |  0.84    Ca    8.5      28 Jan 2023 06:00    TPro  6.7  /  Alb  2.9<L>  /  TBili  0.3  /  DBili  x   /  AST  17  /  ALT  17  /  AlkPhos  104  01-27

## 2023-01-28 NOTE — CONSULT NOTE ADULT - SUBJECTIVE AND OBJECTIVE BOX
History of Present Illness: The patient is a 77 year old female with a history of severe rheumatic mitral stenosis, atrial fibrillation, CVA, leadless pacemaker who presents with GI bleed. The patient has noted black over the past 1-2 days. She noted feeling nauseous, diaphoretic, lightheaded. No chest pain, shortness of breath, palpitations. She is on warfarin for recent CVA with atrial fibrillation and mitral stenosis.    Past Medical/Surgical History:  severe rheumatic mitral stenosis, atrial fibrillation, CVA, leadless pacemaker    Medications:  Warfarin 1 mg alternating with 2 mg daily  Aspirin 81 mg daily  Atorvastatin 20 mg daily      Family History: Non-contributory family history of premature cardiovascular atherosclerotic disease    Social History: No tobacco, alcohol or drug use    Review of Systems:  General: No fevers, chills, weight gain  Skin: No rashes, color changes  Cardiovascular: No chest pain, orthopnea  Respiratory: No shortness of breath, cough  Gastrointestinal: No nausea, abdominal pain  Genitourinary: No incontinence, pain with urination  Musculoskeletal: No pain, swelling, decreased range of motion  Neurological: No headache, weakness  Psychiatric: No depression, anxiety  Endocrine: No weight gain, increased thirst  All other systems are comprehensively negative.    Physical Exam:  Vitals:        Vital Signs Last 24 Hrs  T(C): 36.4 (28 Jan 2023 04:59), Max: 36.7 (27 Jan 2023 21:14)  T(F): 97.5 (28 Jan 2023 04:59), Max: 98 (27 Jan 2023 21:14)  HR: 65 (28 Jan 2023 04:59) (65 - 93)  BP: 100/62 (28 Jan 2023 04:59) (88/57 - 117/67)  BP(mean): 68 (27 Jan 2023 18:55) (68 - 68)  RR: 16 (28 Jan 2023 04:59) (15 - 18)  SpO2: 100% (28 Jan 2023 04:59) (99% - 100%)  Parameters below as of 27 Jan 2023 21:14  Patient On (Oxygen Delivery Method): room air  General: NAD  HEENT: MMM  Neck: No JVD, no carotid bruit  Lungs: CTAB  CV: RRR, nl S1/S2, no M/R/G  Abdomen: S/NT/ND, +BS  Extremities: No LE edema, no cyanosis  Neuro: AAOx3, non-focal  Skin: No rash    Labs:                        9.2    5.42  )-----------( 290      ( 28 Jan 2023 06:00 )             28.5     01-28    146<H>  |  112<H>  |  38<H>  ----------------------------<  88  4.1   |  27  |  0.84    Ca    8.5      28 Jan 2023 06:00    TPro  6.7  /  Alb  2.9<L>  /  TBili  0.3  /  DBili  x   /  AST  17  /  ALT  17  /  AlkPhos  104  01-27        PT/INR - ( 28 Jan 2023 06:00 )   PT: 46.9 sec;   INR: 4.02 ratio         PTT - ( 28 Jan 2023 06:00 )  PTT:51.3 sec    ECG/Telemetry: AF, LVH with secondary repolarization abnormality

## 2023-01-28 NOTE — PROGRESS NOTE ADULT - ASSESSMENT
77F HLD, Atrial Fibrillation, Rheumatic MS and recent CVA admitted for GI Bleed    GI Bleed / Acute Blood Loss Anemia  Patient on Coumadin and INR was supratherapeutic   Hold Coumadin and trend INR  Type and Screen and Transfuse if HgB <7.0  Protonix Infusion and Carafate Q6H   GI Consulted and planned for EGD on Monday 1/30/23    Atrial Fibrillation / Rheumatic MS / HLD   Has pacemaker and Atrial Fibrillation on ECG   Echo 11/22 with normal LV systolic function, rheumatic mitral stenosis  Holding Warfarin given GI Bleed  Cardiology Consult noted     Diet  Clears     DVT Prophylaxis  Hold all agents    Disposition   Discharge planning pending hospital course
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
## 2023-01-28 NOTE — PROGRESS NOTE ADULT - SUBJECTIVE AND OBJECTIVE BOX
Subjective: Patient seen and examined. No overnight events. No BM's.  HgB has been trending down.     MEDICATIONS  (STANDING):  lactated ringers. 1000 milliLiter(s) (75 mL/Hr) IV Continuous <Continuous>  pantoprazole Infusion 8 mG/Hr (10 mL/Hr) IV Continuous <Continuous>  sucralfate 1 Gram(s) Oral every 6 hours    MEDICATIONS  (PRN):      Allergies    Allergy Status Unknown  No Known Allergies    Intolerances        Vital Signs Last 24 Hrs  T(C): 36.6 (28 Jan 2023 13:30), Max: 36.7 (27 Jan 2023 21:14)  T(F): 97.9 (28 Jan 2023 13:30), Max: 98 (27 Jan 2023 21:14)  HR: 71 (28 Jan 2023 13:30) (65 - 93)  BP: 92/59 (28 Jan 2023 13:30) (88/57 - 117/67)  BP(mean): 68 (27 Jan 2023 18:55) (68 - 68)  RR: 15 (28 Jan 2023 13:30) (15 - 18)  SpO2: 96% (28 Jan 2023 13:30) (96% - 100%)    Parameters below as of 28 Jan 2023 13:30  Patient On (Oxygen Delivery Method): room air        PHYSICAL EXAM:  GENERAL: NAD, well-groomed, well-developed  HEAD:  Atraumatic, Normocephalic  ENMT: Moist mucous membranes,   NECK: Supple, No JVD, Normal thyroid  NERVOUS SYSTEM:  All 4 extremities mobile, no gross sensory deficits.   CHEST/LUNG: Clear to auscultation bilaterally; No rales, rhonchi, wheezing, or rubs  HEART: Regular rate and rhythm; No murmurs, rubs, or gallops  ABDOMEN: Soft, Nontender, Nondistended; Bowel sounds present  EXTREMITIES:  2+ Peripheral Pulses, No clubbing, cyanosis, or edema      LABS:                        9.5    x     )-----------( x        ( 28 Jan 2023 11:47 )             29.3     28 Jan 2023 06:00    146    |  112    |  38     ----------------------------<  88     4.1     |  27     |  0.84     Ca    8.5        28 Jan 2023 06:00    TPro  6.7    /  Alb  2.9    /  TBili  0.3    /  DBili  x      /  AST  17     /  ALT  17     /  AlkPhos  104    27 Jan 2023 15:59    PT/INR - ( 28 Jan 2023 06:00 )   PT: 46.9 sec;   INR: 4.02 ratio         PTT - ( 28 Jan 2023 06:00 )  PTT:51.3 sec    CAPILLARY BLOOD GLUCOSE          RADIOLOGY & ADDITIONAL TESTS:    Imaging Personally Reviewed:  [ ] YES     Consultant(s) Notes Reviewed:      Care Discussed with Consultants/Other Providers:    Advanced Directives: [ ] DNR  [ ] No feeding tube  [ ] MOLST in chart  [ ] MOLST completed today  [ ] Unknown

## 2023-01-29 ENCOUNTER — TRANSCRIPTION ENCOUNTER (OUTPATIENT)
Age: 78
End: 2023-01-29

## 2023-01-29 LAB
BASOPHILS # BLD AUTO: 0.02 K/UL — SIGNIFICANT CHANGE UP (ref 0–0.2)
BASOPHILS NFR BLD AUTO: 0.4 % — SIGNIFICANT CHANGE UP (ref 0–2)
EOSINOPHIL # BLD AUTO: 0.11 K/UL — SIGNIFICANT CHANGE UP (ref 0–0.5)
EOSINOPHIL NFR BLD AUTO: 2.3 % — SIGNIFICANT CHANGE UP (ref 0–6)
HCT VFR BLD CALC: 27.3 % — LOW (ref 34.5–45)
HCT VFR BLD CALC: 28 % — LOW (ref 34.5–45)
HCT VFR BLD CALC: 29.4 % — LOW (ref 34.5–45)
HGB BLD-MCNC: 8.8 G/DL — LOW (ref 11.5–15.5)
HGB BLD-MCNC: 8.9 G/DL — LOW (ref 11.5–15.5)
HGB BLD-MCNC: 9.4 G/DL — LOW (ref 11.5–15.5)
IMM GRANULOCYTES NFR BLD AUTO: 0.4 % — SIGNIFICANT CHANGE UP (ref 0–0.9)
INR BLD: 2.69 RATIO — HIGH (ref 0.88–1.16)
LYMPHOCYTES # BLD AUTO: 1.11 K/UL — SIGNIFICANT CHANGE UP (ref 1–3.3)
LYMPHOCYTES # BLD AUTO: 23 % — SIGNIFICANT CHANGE UP (ref 13–44)
MCHC RBC-ENTMCNC: 28.4 PG — SIGNIFICANT CHANGE UP (ref 27–34)
MCHC RBC-ENTMCNC: 31.8 GM/DL — LOW (ref 32–36)
MCV RBC AUTO: 89.5 FL — SIGNIFICANT CHANGE UP (ref 80–100)
MONOCYTES # BLD AUTO: 0.37 K/UL — SIGNIFICANT CHANGE UP (ref 0–0.9)
MONOCYTES NFR BLD AUTO: 7.7 % — SIGNIFICANT CHANGE UP (ref 2–14)
NEUTROPHILS # BLD AUTO: 3.19 K/UL — SIGNIFICANT CHANGE UP (ref 1.8–7.4)
NEUTROPHILS NFR BLD AUTO: 66.2 % — SIGNIFICANT CHANGE UP (ref 43–77)
NRBC # BLD: 0 /100 WBCS — SIGNIFICANT CHANGE UP (ref 0–0)
PLATELET # BLD AUTO: 277 K/UL — SIGNIFICANT CHANGE UP (ref 150–400)
PROTHROM AB SERPL-ACNC: 31.2 SEC — HIGH (ref 10.5–13.4)
RBC # BLD: 3.13 M/UL — LOW (ref 3.8–5.2)
RBC # FLD: 13.9 % — SIGNIFICANT CHANGE UP (ref 10.3–14.5)
WBC # BLD: 4.82 K/UL — SIGNIFICANT CHANGE UP (ref 3.8–10.5)
WBC # FLD AUTO: 4.82 K/UL — SIGNIFICANT CHANGE UP (ref 3.8–10.5)

## 2023-01-29 PROCEDURE — 99233 SBSQ HOSP IP/OBS HIGH 50: CPT

## 2023-01-29 PROCEDURE — 99232 SBSQ HOSP IP/OBS MODERATE 35: CPT

## 2023-01-29 RX ADMIN — SODIUM CHLORIDE 75 MILLILITER(S): 9 INJECTION, SOLUTION INTRAVENOUS at 18:16

## 2023-01-29 RX ADMIN — Medication 1 GRAM(S): at 05:08

## 2023-01-29 RX ADMIN — SODIUM CHLORIDE 75 MILLILITER(S): 9 INJECTION, SOLUTION INTRAVENOUS at 04:14

## 2023-01-29 RX ADMIN — Medication 1 GRAM(S): at 11:05

## 2023-01-29 RX ADMIN — Medication 1 GRAM(S): at 00:08

## 2023-01-29 RX ADMIN — PANTOPRAZOLE SODIUM 10 MG/HR: 20 TABLET, DELAYED RELEASE ORAL at 14:13

## 2023-01-29 RX ADMIN — Medication 1 GRAM(S): at 17:07

## 2023-01-29 RX ADMIN — PANTOPRAZOLE SODIUM 10 MG/HR: 20 TABLET, DELAYED RELEASE ORAL at 04:14

## 2023-01-29 NOTE — DIETITIAN INITIAL EVALUATION ADULT - NS FNS DIET ORDER
Diet, NPO after Midnight:      NPO Start Date: 29-Jan-2023,   NPO Start Time: 23:59 (01-29-23 @ 10:32)  Diet, Clear Liquid (01-28-23 @ 17:42)

## 2023-01-29 NOTE — PROGRESS NOTE ADULT - SUBJECTIVE AND OBJECTIVE BOX
MEDICATIONS  (STANDING):  lactated ringers. 1000 milliLiter(s) (75 mL/Hr) IV Continuous <Continuous>  pantoprazole Infusion 8 mG/Hr (10 mL/Hr) IV Continuous <Continuous>  sucralfate 1 Gram(s) Oral every 6 hours    MEDICATIONS  (PRN):      Vital Signs Last 24 Hrs  T(C): 36.4 (29 Jan 2023 04:50), Max: 36.8 (28 Jan 2023 17:39)  T(F): 97.5 (29 Jan 2023 04:50), Max: 98.3 (28 Jan 2023 17:39)  HR: 73 (29 Jan 2023 04:50) (69 - 74)  BP: 97/62 (29 Jan 2023 04:50) (92/59 - 108/73)  BP(mean): --  RR: 18 (29 Jan 2023 04:50) (15 - 18)  SpO2: 97% (29 Jan 2023 04:50) (96% - 98%)    Parameters below as of 29 Jan 2023 04:50  Patient On (Oxygen Delivery Method): room air                              8.9    4.82  )-----------( 277      ( 29 Jan 2023 06:30 )             28.0       01-28    146<H>  |  112<H>  |  38<H>  ----------------------------<  88  4.1   |  27  |  0.84    Ca    8.5      28 Jan 2023 06:00    TPro  6.7  /  Alb  2.9<L>  /  TBili  0.3  /  DBili  x   /  AST  17  /  ALT  17  /  AlkPhos  104  01-27   Reviewed chart.    Called son Pierre who was speaker phone - provided translation for patient.  Patient reports feeling well.  Had two small dotty of black stool yesterday saved in a plastic cup by her .  No BM today.  Denies abdominal pain, n/v.      MEDICATIONS  (STANDING):  lactated ringers. 1000 milliLiter(s) (75 mL/Hr) IV Continuous <Continuous>  pantoprazole Infusion 8 mG/Hr (10 mL/Hr) IV Continuous <Continuous>  sucralfate 1 Gram(s) Oral every 6 hours    MEDICATIONS  (PRN):      Vital Signs Last 24 Hrs  T(C): 36.4 (29 Jan 2023 04:50), Max: 36.8 (28 Jan 2023 17:39)  T(F): 97.5 (29 Jan 2023 04:50), Max: 98.3 (28 Jan 2023 17:39)  HR: 73 (29 Jan 2023 04:50) (69 - 74)  BP: 97/62 (29 Jan 2023 04:50) (92/59 - 108/73)  BP(mean): --  RR: 18 (29 Jan 2023 04:50) (15 - 18)  SpO2: 97% (29 Jan 2023 04:50) (96% - 98%)    Parameters below as of 29 Jan 2023 04:50  Patient On (Oxygen Delivery Method): room air      HEENT:  NCAT  CV:  Nml s1 and s2  Lung:  CTA  Abd:  soft, NT, ND  Ext: no e/c/c                          8.9    4.82  )-----------( 277      ( 29 Jan 2023 06:30 )             28.0       01-28    146<H>  |  112<H>  |  38<H>  ----------------------------<  88  4.1   |  27  |  0.84    Ca    8.5      28 Jan 2023 06:00    TPro  6.7  /  Alb  2.9<L>  /  TBili  0.3  /  DBili  x   /  AST  17  /  ALT  17  /  AlkPhos  104  01-27

## 2023-01-29 NOTE — DIETITIAN INITIAL EVALUATION ADULT - OTHER INFO
Visited patient in room, diet advanced to clear liquid since 1/28 with good tolerance. Denies n/v/d/c, last BM 1/27. No reported difficulty chewing or swallowing. NKFA. Reported #, 104# in Nov2022#, current adm weight 120#, no daily weight in house. Sates also been slim, weight appears to be stable, will continue to monitor weight trends as able.     Pertinent medications/nutrition labs reviewed; receiving LR in house.     Per chart review, pt to be NPO at midnight for EGD on monday. Will provide diet education when appropriate, will assess %po intake. Pt receptive, no nutrition related questions at this time. RD to continue to monitor nutrition status per protocol.  Visited patient in room, diet advanced to clear liquid since 1/28 with good tolerance. Denies n/v/d/c, last BM 1/27. No reported difficulty chewing or swallowing. NKFA. Reported #, 104# in Nov2022#, current adm weight 120#, no daily weight in house. Sates always been slim, weight appears to be stable, will continue to monitor weight trends as able.     Pertinent medications/nutrition labs reviewed; receiving LR in house.     Per chart review, pt to be NPO at midnight for EGD on monday. Will provide diet education when appropriate, will assess %po intake. Pt receptive, no nutrition related questions at this time. RD to continue to monitor nutrition status per protocol.

## 2023-01-29 NOTE — DIETITIAN INITIAL EVALUATION ADULT - REASON FOR ADMISSION
Per H&P "This is a 76 y/o F with PMH of Dyslipidemia, Rheumatic MS, A. Fib on Coumadin, s/p Micra, CVA 10 weeks ago s/p endovascular therapy, and Osteoporosis, who presented with passing black stools. Patient was taking a shower earlier today when she suddenly felt dizzy & weak, was able to manage to walk to the sofa without passing out, was witnessed to be diaphoretic & pale, and around 3 pm she went to the bathroom where she got a liquid BM with black stools, no abdominal pain, reports nausea without vomiting, no further BMs since then, no history of similar episode in the past but had an endoscopy about a year ago that revealed gastritis. At the ED her INR was 4.93, no reported bleeding from any other source."

## 2023-01-29 NOTE — PROGRESS NOTE ADULT - SUBJECTIVE AND OBJECTIVE BOX
Chief Complaint: GI bleed    Interval Events: No events overnight.    Review of Systems:  General: No fevers, chills, weight gain  Skin: No rashes, color changes  Cardiovascular: No chest pain, orthopnea  Respiratory: No shortness of breath, cough  Gastrointestinal: No nausea, abdominal pain  Genitourinary: No incontinence, pain with urination  Musculoskeletal: No pain, swelling, decreased range of motion  Neurological: No headache, weakness  Psychiatric: No depression, anxiety  Endocrine: No weight gain, increased thirst  All other systems are comprehensively negative.    Physical Exam:  Vitals:        Vital Signs Last 24 Hrs  T(C): 36.4 (29 Jan 2023 04:50), Max: 36.8 (28 Jan 2023 17:39)  T(F): 97.5 (29 Jan 2023 04:50), Max: 98.3 (28 Jan 2023 17:39)  HR: 73 (29 Jan 2023 04:50) (69 - 74)  BP: 97/62 (29 Jan 2023 04:50) (92/59 - 108/73)  BP(mean): --  RR: 18 (29 Jan 2023 04:50) (15 - 18)  SpO2: 97% (29 Jan 2023 04:50) (96% - 98%)  Parameters below as of 29 Jan 2023 04:50  Patient On (Oxygen Delivery Method): room air  General: NAD  HEENT: MMM  Neck: No JVD, no carotid bruit  Lungs: CTAB  CV: Irregular, nl S1/S2, no M/R/G  Abdomen: S/NT/ND, +BS  Extremities: No LE edema, no cyanosis  Neuro: AAOx3, non-focal  Skin: No rash    Labs:                        8.9    4.82  )-----------( 277      ( 29 Jan 2023 06:30 )             28.0     01-28    146<H>  |  112<H>  |  38<H>  ----------------------------<  88  4.1   |  27  |  0.84    Ca    8.5      28 Jan 2023 06:00    TPro  6.7  /  Alb  2.9<L>  /  TBili  0.3  /  DBili  x   /  AST  17  /  ALT  17  /  AlkPhos  104  01-27        PT/INR - ( 28 Jan 2023 06:00 )   PT: 46.9 sec;   INR: 4.02 ratio         PTT - ( 28 Jan 2023 06:00 )  PTT:51.3 sec    ECG/Telemetry: AF

## 2023-01-29 NOTE — DIETITIAN INITIAL EVALUATION ADULT - PERTINENT LABORATORY DATA
01-28    146<H>  |  112<H>  |  38<H>  ----------------------------<  88  4.1   |  27  |  0.84    Ca    8.5      28 Jan 2023 06:00    TPro  6.7  /  Alb  2.9<L>  /  TBili  0.3  /  DBili  x   /  AST  17  /  ALT  17  /  AlkPhos  104  01-27  A1C with Estimated Average Glucose Result: 5.7 % (11-11-22 @ 19:24)

## 2023-01-29 NOTE — PROGRESS NOTE ADULT - ASSESSMENT
IMPRESSION:  #  Likely upper GI bleeding - clinically stable (stable vitals, one formed black stool today)  -  Elevated BUN, melena     #  Elevated INR    #  Comorbidities:  Afib, CVA (on warfarin), s/p pacemaker         PLAN:   -  PPI gtt/carafate  -  Check Hpylori serology  -  CBC Q 6 hours - transfuse as needed   -  Continue holding coumadin, monitor INR  -  Start clear liquid diet   -  Likely EGD on Monday when INR improves        IMPRESSION:  #  Likely upper GI bleeding - clinically stable - no active bleeding currently.   -  Elevated BUN, melena     #  Elevated INR - no result today    #  Comorbidities:  Afib, CVA (on warfarin), s/p pacemaker         PLAN:   -  EGD with Dr. Dimitri Villa tomorrow morning when INR appropriate (please check INR for tomorrow morning as well)  -  Continue clears, NPO after midnight   -  PPI gtt/carafate  -  Check Hpylori serology  -  CBC Q 6 hours - transfuse as needed   -  Continue holding coumadin, monitor INR

## 2023-01-29 NOTE — PROGRESS NOTE ADULT - SUBJECTIVE AND OBJECTIVE BOX
Subjective: Patient doing well with no overnight events. No more BM's. HgB continues to drift down. INR Pending.     MEDICATIONS  (STANDING):  lactated ringers. 1000 milliLiter(s) (75 mL/Hr) IV Continuous <Continuous>  pantoprazole Infusion 8 mG/Hr (10 mL/Hr) IV Continuous <Continuous>  sucralfate 1 Gram(s) Oral every 6 hours    MEDICATIONS  (PRN):      Allergies    Allergy Status Unknown  No Known Allergies    Intolerances        Vital Signs Last 24 Hrs  T(C): 36.4 (29 Jan 2023 04:50), Max: 36.8 (28 Jan 2023 17:39)  T(F): 97.5 (29 Jan 2023 04:50), Max: 98.3 (28 Jan 2023 17:39)  HR: 73 (29 Jan 2023 04:50) (69 - 74)  BP: 97/62 (29 Jan 2023 04:50) (92/59 - 108/73)  BP(mean): --  RR: 18 (29 Jan 2023 04:50) (15 - 18)  SpO2: 97% (29 Jan 2023 04:50) (96% - 98%)    Parameters below as of 29 Jan 2023 04:50  Patient On (Oxygen Delivery Method): room air        PHYSICAL EXAM:  GENERAL: NAD, well-groomed, well-developed  HEAD:  Atraumatic, Normocephalic  ENMT: Moist mucous membranes,   NECK: Supple, No JVD, Normal thyroid  NERVOUS SYSTEM:  All 4 extremities mobile, no gross sensory deficits.   CHEST/LUNG: Clear to auscultation bilaterally; No rales, rhonchi, wheezing, or rubs  HEART: Regular rate and rhythm; No murmurs, rubs, or gallops  ABDOMEN: Soft, Nontender, Nondistended; Bowel sounds present  EXTREMITIES:  2+ Peripheral Pulses, No clubbing, cyanosis, or edema      LABS:                        8.9    4.82  )-----------( 277      ( 29 Jan 2023 06:30 )             28.0       Ca    8.5        28 Jan 2023 06:00      PT/INR - ( 28 Jan 2023 06:00 )   PT: 46.9 sec;   INR: 4.02 ratio         PTT - ( 28 Jan 2023 06:00 )  PTT:51.3 sec    CAPILLARY BLOOD GLUCOSE          RADIOLOGY & ADDITIONAL TESTS:    Imaging Personally Reviewed:  [ ] YES     Consultant(s) Notes Reviewed:      Care Discussed with Consultants/Other Providers:    Advanced Directives: [ ] DNR  [ ] No feeding tube  [ ] MOLST in chart  [ ] MOLST completed today  [ ] Unknown

## 2023-01-29 NOTE — PROGRESS NOTE ADULT - ASSESSMENT
77F HLD, Atrial Fibrillation, Rheumatic MS and recent CVA admitted for GI Bleed    GI Bleed / Acute Blood Loss Anemia  Patient on Coumadin and INR was supratherapeutic   Hold Coumadin and trend INR  Type and Screen and Transfuse if HgB <7.0  Protonix Infusion and Carafate Q6H   GI Consulted and planned for EGD on Monday 1/30/23  Patient medically optimized with no further testing needed for EGD    Atrial Fibrillation / Rheumatic MS / HLD   Has pacemaker and Atrial Fibrillation on ECG   Echo 11/22 with normal LV systolic function, rheumatic mitral stenosis  Holding Warfarin given GI Bleed  Cardiology Consult noted     Diet  Clears  NPO after midnight for EGD     DVT Prophylaxis  Hold all agents    Disposition   Discharge planning pending hospital course

## 2023-01-29 NOTE — DIETITIAN INITIAL EVALUATION ADULT - NUTRITON FOCUSED PHYSICAL EXAM

## 2023-01-29 NOTE — DIETITIAN INITIAL EVALUATION ADULT - PERTINENT MEDS FT
MEDICATIONS  (STANDING):  lactated ringers. 1000 milliLiter(s) (75 mL/Hr) IV Continuous <Continuous>  pantoprazole Infusion 8 mG/Hr (10 mL/Hr) IV Continuous <Continuous>  sucralfate 1 Gram(s) Oral every 6 hours    MEDICATIONS  (PRN):
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
## 2023-01-30 ENCOUNTER — RESULT REVIEW (OUTPATIENT)
Age: 78
End: 2023-01-30

## 2023-01-30 LAB
ANION GAP SERPL CALC-SCNC: 7 MMOL/L — SIGNIFICANT CHANGE UP (ref 5–17)
BUN SERPL-MCNC: 18 MG/DL — SIGNIFICANT CHANGE UP (ref 7–23)
CALCIUM SERPL-MCNC: 8.3 MG/DL — LOW (ref 8.4–10.5)
CHLORIDE SERPL-SCNC: 109 MMOL/L — HIGH (ref 96–108)
CO2 SERPL-SCNC: 28 MMOL/L — SIGNIFICANT CHANGE UP (ref 22–31)
CREAT SERPL-MCNC: 1 MG/DL — SIGNIFICANT CHANGE UP (ref 0.5–1.3)
EGFR: 58 ML/MIN/1.73M2 — LOW
GLUCOSE SERPL-MCNC: 76 MG/DL — SIGNIFICANT CHANGE UP (ref 70–99)
HCT VFR BLD CALC: 27.4 % — LOW (ref 34.5–45)
HGB BLD-MCNC: 8.8 G/DL — LOW (ref 11.5–15.5)
INR BLD: 3.25 RATIO — HIGH (ref 0.88–1.16)
MCHC RBC-ENTMCNC: 28.5 PG — SIGNIFICANT CHANGE UP (ref 27–34)
MCHC RBC-ENTMCNC: 32.1 GM/DL — SIGNIFICANT CHANGE UP (ref 32–36)
MCV RBC AUTO: 88.7 FL — SIGNIFICANT CHANGE UP (ref 80–100)
NRBC # BLD: 0 /100 WBCS — SIGNIFICANT CHANGE UP (ref 0–0)
PLATELET # BLD AUTO: 309 K/UL — SIGNIFICANT CHANGE UP (ref 150–400)
POTASSIUM SERPL-MCNC: 3.7 MMOL/L — SIGNIFICANT CHANGE UP (ref 3.5–5.3)
POTASSIUM SERPL-SCNC: 3.7 MMOL/L — SIGNIFICANT CHANGE UP (ref 3.5–5.3)
PROTHROM AB SERPL-ACNC: 38.8 SEC — HIGH (ref 10.5–13.4)
RBC # BLD: 3.09 M/UL — LOW (ref 3.8–5.2)
RBC # FLD: 13.9 % — SIGNIFICANT CHANGE UP (ref 10.3–14.5)
SODIUM SERPL-SCNC: 144 MMOL/L — SIGNIFICANT CHANGE UP (ref 135–145)
WBC # BLD: 4.89 K/UL — SIGNIFICANT CHANGE UP (ref 3.8–10.5)
WBC # FLD AUTO: 4.89 K/UL — SIGNIFICANT CHANGE UP (ref 3.8–10.5)

## 2023-01-30 PROCEDURE — 88305 TISSUE EXAM BY PATHOLOGIST: CPT | Mod: 26

## 2023-01-30 PROCEDURE — 88312 SPECIAL STAINS GROUP 1: CPT | Mod: 26

## 2023-01-30 PROCEDURE — 99232 SBSQ HOSP IP/OBS MODERATE 35: CPT

## 2023-01-30 DEVICE — SPEEDBAND SPRVW 7: Type: IMPLANTABLE DEVICE | Status: FUNCTIONAL

## 2023-01-30 DEVICE — RETRIEVAL FOOD BOLUS ROTHNET: Type: IMPLANTABLE DEVICE | Status: FUNCTIONAL

## 2023-01-30 DEVICE — CLIP RESOLUTION 360 235CM: Type: IMPLANTABLE DEVICE | Status: FUNCTIONAL

## 2023-01-30 DEVICE — RESOLUTION CLIP HEMOSTATIC DEVICE: Type: IMPLANTABLE DEVICE | Status: FUNCTIONAL

## 2023-01-30 DEVICE — ESOPHAGEAL BALLOON CATH CRE FIXED WIRE 12-13.5-15MM: Type: IMPLANTABLE DEVICE | Status: FUNCTIONAL

## 2023-01-30 RX ORDER — SODIUM CHLORIDE 9 MG/ML
1000 INJECTION, SOLUTION INTRAVENOUS
Refills: 0 | Status: DISCONTINUED | OUTPATIENT
Start: 2023-01-30 | End: 2023-01-31

## 2023-01-30 RX ORDER — ONDANSETRON 8 MG/1
4 TABLET, FILM COATED ORAL ONCE
Refills: 0 | Status: DISCONTINUED | OUTPATIENT
Start: 2023-01-30 | End: 2023-01-31

## 2023-01-30 RX ADMIN — PANTOPRAZOLE SODIUM 10 MG/HR: 20 TABLET, DELAYED RELEASE ORAL at 00:18

## 2023-01-30 RX ADMIN — Medication 1 GRAM(S): at 00:18

## 2023-01-30 RX ADMIN — Medication 1 GRAM(S): at 18:01

## 2023-01-30 RX ADMIN — SODIUM CHLORIDE 75 MILLILITER(S): 9 INJECTION, SOLUTION INTRAVENOUS at 17:52

## 2023-01-30 RX ADMIN — PANTOPRAZOLE SODIUM 10 MG/HR: 20 TABLET, DELAYED RELEASE ORAL at 11:18

## 2023-01-30 RX ADMIN — PANTOPRAZOLE SODIUM 10 MG/HR: 20 TABLET, DELAYED RELEASE ORAL at 17:52

## 2023-01-30 RX ADMIN — Medication 1 GRAM(S): at 23:57

## 2023-01-30 NOTE — CARE COORDINATION ASSESSMENT. - OTHER PERTINENT REFERRAL INFORMATION
Abdominal RUQ TTP and mild RLQ TTP.
This is a 76 y/o F with PMH of Dyslipidemia, Rheumatic MS, A. Fib on Coumadin, s/p Micra, CVA 10 weeks ago s/p endovascular therapy, and Osteoporosis, who presented with passing black stools. Patient was taking a shower earlier today when she suddenly felt dizzy & weak, was able to manage to walk to the sofa without passing out, was witnessed to be diaphoretic & pale, and around 3 pm she went to the bathroom where she got a liquid BM with black stools, no abdominal pain, reports nausea without vomiting, no further BMs since then, no history of similar episode in the past but had an endoscopy about a year ago that revealed gastritis. At the ED her INR was 4.93, no reported bleeding from any other source. Lives w supportive spouse and son in a split level. No DME needs PTA. No HCS. Son to transport when DC ready

## 2023-01-30 NOTE — PROGRESS NOTE ADULT - SUBJECTIVE AND OBJECTIVE BOX
Chief Complaint: GI bleed    Interval Events: No events overnight.    Review of Systems:  General: No fevers, chills, weight gain  Skin: No rashes, color changes  Cardiovascular: No chest pain, orthopnea  Respiratory: No shortness of breath, cough  Gastrointestinal: No nausea, abdominal pain  Genitourinary: No incontinence, pain with urination  Musculoskeletal: No pain, swelling, decreased range of motion  Neurological: No headache, weakness  Psychiatric: No depression, anxiety  Endocrine: No weight gain, increased thirst  All other systems are comprehensively negative.    Physical Exam:  Vital Signs Last 24 Hrs  T(C): 36.7 (30 Jan 2023 04:39), Max: 36.9 (30 Jan 2023 02:02)  T(F): 98 (30 Jan 2023 04:39), Max: 98.4 (30 Jan 2023 02:02)  HR: 62 (30 Jan 2023 04:39) (62 - 85)  BP: 102/63 (30 Jan 2023 04:39) (97/63 - 103/63)  BP(mean): --  RR: 18 (30 Jan 2023 04:39) (15 - 18)  SpO2: 98% (30 Jan 2023 04:39) (97% - 100%)  Parameters below as of 30 Jan 2023 02:02  Patient On (Oxygen Delivery Method): room air  General: NAD  HEENT: MMM  Neck: No JVD, no carotid bruit  Lungs: CTAB  CV: Irregular, nl S1/S2, no M/R/G  Abdomen: S/NT/ND, +BS  Extremities: No LE edema, no cyanosis  Neuro: AAOx3, non-focal  Skin: No rash    Labs:    01-30    144  |  109<H>  |  18  ----------------------------<  75  3.7   |  28  |  1.00    Ca    8.3<L>      30 Jan 2023 06:00                          8.8    4.89  )-----------( 309      ( 30 Jan 2023 06:00 )             27.4       ECG/Telemetry: AF

## 2023-01-30 NOTE — PROGRESS NOTE ADULT - SUBJECTIVE AND OBJECTIVE BOX
Subjective: Patient seen and examined. No overnight events. Doing well. Waiting for Colonscopy.     MEDICATIONS  (STANDING):  lactated ringers. 1000 milliLiter(s) (75 mL/Hr) IV Continuous <Continuous>  pantoprazole Infusion 8 mG/Hr (10 mL/Hr) IV Continuous <Continuous>  sucralfate 1 Gram(s) Oral every 6 hours    MEDICATIONS  (PRN):      Allergies    Allergy Status Unknown  No Known Allergies    Intolerances        Vital Signs Last 24 Hrs  T(C): 36.4 (30 Jan 2023 13:00), Max: 36.9 (30 Jan 2023 02:02)  T(F): 97.6 (30 Jan 2023 13:00), Max: 98.4 (30 Jan 2023 02:02)  HR: 70 (30 Jan 2023 13:00) (62 - 75)  BP: 105/67 (30 Jan 2023 13:00) (100/61 - 109/65)  BP(mean): --  RR: 16 (30 Jan 2023 13:00) (16 - 18)  SpO2: 99% (30 Jan 2023 13:00) (98% - 99%)    Parameters below as of 30 Jan 2023 13:00  Patient On (Oxygen Delivery Method): room air        PHYSICAL EXAM:  GENERAL: NAD, well-groomed, well-developed  HEAD:  Atraumatic, Normocephalic  ENMT: Moist mucous membranes,   NECK: Supple, No JVD, Normal thyroid  NERVOUS SYSTEM:  All 4 extremities mobile, no gross sensory deficits.   CHEST/LUNG: Clear to auscultation bilaterally; No rales, rhonchi, wheezing, or rubs  HEART: Regular rate and rhythm; No murmurs, rubs, or gallops  ABDOMEN: Soft, Nontender, Nondistended; Bowel sounds present  EXTREMITIES:  2+ Peripheral Pulses, No clubbing, cyanosis, or edema      LABS:                        8.8    4.89  )-----------( 309      ( 30 Jan 2023 06:00 )             27.4     30 Jan 2023 06:00    144    |  109    |  18     ----------------------------<  75     3.7     |  28     |  1.00     Ca    8.3        30 Jan 2023 06:00      PT/INR - ( 30 Jan 2023 06:00 )   PT: 38.8 sec;   INR: 3.25 ratio             CAPILLARY BLOOD GLUCOSE          RADIOLOGY & ADDITIONAL TESTS:    Imaging Personally Reviewed:  [ ] YES     Consultant(s) Notes Reviewed:      Care Discussed with Consultants/Other Providers:    Advanced Directives: [ ] DNR  [ ] No feeding tube  [ ] MOLST in chart  [ ] MOLST completed today  [ ] Unknown

## 2023-01-30 NOTE — PROGRESS NOTE ADULT - ASSESSMENT
77F HLD, Atrial Fibrillation, Rheumatic MS and recent CVA admitted for GI Bleed    GI Bleed / Acute Blood Loss Anemia  Patient on Coumadin and INR was supratherapeutic   Hold Coumadin and trend INR  Type and Screen and Transfuse if HgB <7.0  Protonix Infusion and Carafate Q6H   GI Consulted and planned for EGD on Monday 1/30/23  Patient medically optimized with no further testing needed for EGD    Atrial Fibrillation / Rheumatic MS / HLD   Has pacemaker and Atrial Fibrillation on ECG   Echo 11/22 with normal LV systolic function, rheumatic mitral stenosis  Holding Warfarin given GI Bleed  Cardiology Consult noted     Diet  NPO    DVT Prophylaxis  Hold all agents    Disposition   Discharge planning pending hospital course   Patient to stay overnight to discuss AC options and trend HgB

## 2023-01-30 NOTE — CARE COORDINATION ASSESSMENT. - NSPASTMEDSURGHISTORY_GEN_ALL_CORE_FT
PAST MEDICAL & SURGICAL HISTORY:  Pyelonephritis      Stroke      Atrial fibrillation      Heart murmur after rheumatic heart disease      Pacemaker

## 2023-01-30 NOTE — CARE COORDINATION ASSESSMENT. - NSCAREPROVIDERS_GEN_ALL_CORE_FT
CARE PROVIDERS:  Accepting Physician: Susan Bautista  Administration: Errol Hackett  Admitting: Susan Bautista  Attending: Susan Bautista  Case Management: Gayathri Greenberg  Consultant: Dimitri Villa  Consultant: Martir Dixon  ED ACP: Phi Varghese  ED Attending: Domo Cruz  ED Nurse: Evaristo Plata  ED Nurse 2: Tanmay Llamas  Infection Control: Huong George  Nurse: Ruby Lara  Nurse: Jahaira Donis  Nurse: Paty Escamilla  Nurse: Raf Ellis  Nurse: Elvin Franklin  Outpatient Provider: Martir Dixon  Override: Yoly Gold  PCA/Nursing Assistant: Kailash Staley  Primary Team: Royal Hairston  : Poly Mendez  Team: REBEKAH  Hospitalists, Team  Technician: Kylah Peres  UR// Supp. Assoc.: Doug Moreno  UR// Supp. Assoc.: Yoly Gold

## 2023-01-31 LAB
H PYLORI AB SER-ACNC: <5 UNITS — SIGNIFICANT CHANGE UP
HCT VFR BLD CALC: 28.8 % — LOW (ref 34.5–45)
HGB BLD-MCNC: 9.3 G/DL — LOW (ref 11.5–15.5)
INR BLD: 3.48 RATIO — HIGH (ref 0.88–1.16)
MCHC RBC-ENTMCNC: 28.4 PG — SIGNIFICANT CHANGE UP (ref 27–34)
MCHC RBC-ENTMCNC: 32.3 GM/DL — SIGNIFICANT CHANGE UP (ref 32–36)
MCV RBC AUTO: 87.8 FL — SIGNIFICANT CHANGE UP (ref 80–100)
NRBC # BLD: 0 /100 WBCS — SIGNIFICANT CHANGE UP (ref 0–0)
PLATELET # BLD AUTO: 332 K/UL — SIGNIFICANT CHANGE UP (ref 150–400)
PROTHROM AB SERPL-ACNC: 40.5 SEC — HIGH (ref 10.5–13.4)
RBC # BLD: 3.28 M/UL — LOW (ref 3.8–5.2)
RBC # FLD: 14.1 % — SIGNIFICANT CHANGE UP (ref 10.3–14.5)
WBC # BLD: 5.22 K/UL — SIGNIFICANT CHANGE UP (ref 3.8–10.5)
WBC # FLD AUTO: 5.22 K/UL — SIGNIFICANT CHANGE UP (ref 3.8–10.5)

## 2023-01-31 PROCEDURE — 99232 SBSQ HOSP IP/OBS MODERATE 35: CPT

## 2023-01-31 RX ORDER — PANTOPRAZOLE SODIUM 20 MG/1
40 TABLET, DELAYED RELEASE ORAL EVERY 12 HOURS
Refills: 0 | Status: DISCONTINUED | OUTPATIENT
Start: 2023-01-31 | End: 2023-02-02

## 2023-01-31 RX ADMIN — Medication 1 GRAM(S): at 17:14

## 2023-01-31 RX ADMIN — Medication 1 GRAM(S): at 05:56

## 2023-01-31 RX ADMIN — PANTOPRAZOLE SODIUM 40 MILLIGRAM(S): 20 TABLET, DELAYED RELEASE ORAL at 17:15

## 2023-01-31 RX ADMIN — Medication 1 GRAM(S): at 12:05

## 2023-01-31 NOTE — PROGRESS NOTE ADULT - SUBJECTIVE AND OBJECTIVE BOX
Chief Complaint: GI bleed    Interval Events: No events overnight.    Review of Systems:  General: No fevers, chills, weight gain  Skin: No rashes, color changes  Cardiovascular: No chest pain, orthopnea  Respiratory: No shortness of breath, cough  Gastrointestinal: No nausea, abdominal pain  Genitourinary: No incontinence, pain with urination  Musculoskeletal: No pain, swelling, decreased range of motion  Neurological: No headache, weakness  Psychiatric: No depression, anxiety  Endocrine: No weight gain, increased thirst  All other systems are comprehensively negative.    Physical Exam:  Vital Signs Last 24 Hrs  T(C): 36.6 (31 Jan 2023 06:55), Max: 36.7 (30 Jan 2023 15:27)  T(F): 97.8 (31 Jan 2023 06:55), Max: 98.1 (30 Jan 2023 15:27)  HR: 83 (31 Jan 2023 06:55) (70 - 86)  BP: 120/74 (31 Jan 2023 06:55) (105/67 - 131/66)  BP(mean): --  RR: 16 (31 Jan 2023 06:55) (15 - 21)  SpO2: 98% (31 Jan 2023 06:55) (94% - 100%)  Parameters below as of 31 Jan 2023 06:55  Patient On (Oxygen Delivery Method): room air  General: NAD  HEENT: MMM  Neck: No JVD, no carotid bruit  Lungs: CTAB  CV: Irregular, nl S1/S2, no M/R/G  Abdomen: S/NT/ND, +BS  Extremities: No LE edema, no cyanosis  Neuro: AAOx3, non-focal  Skin: No rash    Labs:    01-30    144  |  109<H>  |  18  ----------------------------<  76  3.7   |  28  |  1.00    Ca    8.3<L>      30 Jan 2023 06:00                          9.3    5.22  )-----------( 332      ( 31 Jan 2023 08:13 )             28.8         ECG/Telemetry: AF

## 2023-01-31 NOTE — PROGRESS NOTE ADULT - SUBJECTIVE AND OBJECTIVE BOX
Problem: Potential for Falls  Goal: Patient will remain free of falls  Description  INTERVENTIONS:  - Assess patient frequently for physical needs  -  Identify cognitive and physical deficits and behaviors that affect risk of falls    -  [place] fall precautions as indicated by assessment   - Educate patient/family on patient safety including physical limitations  - Instruct patient to call for assistance with activity based on assessment  - Modify environment to reduce risk of injury  - Consider OT/PT consult to assist with strengthening/mobility  Outcome: Progressing INTERVAL HPI/OVERNIGHT EVENTS:  No new overnight event.  No N/V/D.  Tolerating diet.  no melena    Allergies    Allergy Status Unknown    Intolerances    General:  No wt loss, fevers, chills, night sweats, fatigue,   Eyes:  Good vision, no reported pain  ENT:  No sore throat, pain, runny nose, dysphagia  CV:  No pain, palpitations, hypo/hypertension  Resp:  No dyspnea, cough, tachypnea, wheezing  GI:  No pain, No nausea, No vomiting, No diarrhea, No constipation, No weight loss, No fever, No pruritis, No rectal bleeding, No tarry stools, No dysphagia,  :  No pain, bleeding, incontinence, nocturia  Muscle:  No pain, weakness  Neuro:  No weakness, tingling, memory problems  Psych:  No fatigue, insomnia, mood problems, depression  Endocrine:  No polyuria, polydipsia, cold/heat intolerance  Heme:  No petechiae, ecchymosis, easy bruisability  Skin:  No rash, tattoos, scars, edema      PHYSICAL EXAM:   Vital Signs:  Vital Signs Last 24 Hrs  T(C): 36.6 (31 Jan 2023 06:55), Max: 36.7 (30 Jan 2023 15:27)  T(F): 97.8 (31 Jan 2023 06:55), Max: 98.1 (30 Jan 2023 15:27)  HR: 83 (31 Jan 2023 06:55) (70 - 86)  BP: 120/74 (31 Jan 2023 06:55) (105/67 - 131/66)  BP(mean): --  RR: 16 (31 Jan 2023 06:55) (15 - 21)  SpO2: 98% (31 Jan 2023 06:55) (94% - 100%)    Parameters below as of 31 Jan 2023 06:55  Patient On (Oxygen Delivery Method): room air      Daily Height in cm: 154.94 (30 Jan 2023 15:27)    Daily I&O's Summary    30 Jan 2023 07:01  -  31 Jan 2023 07:00  --------------------------------------------------------  IN: 1280 mL / OUT: 0 mL / NET: 1280 mL        GENERAL:  Appears stated age, well-groomed, well-nourished, no distress  HEENT:  NC/AT,  conjunctivae clear and pink, no thyromegaly, nodules, adenopathy, no JVD, sclera -anicteric  CHEST:  Full & symmetric excursion, no increased effort, breath sounds clear  HEART:  Regular rhythm, S1, S2, no murmur/rub/S3/S4, no abdominal bruit, no edema  ABDOMEN:  Soft, non-tender, non-distended, normoactive bowel sounds,  no masses ,no hepato-splenomegaly, no signs of chronic liver disease  EXTEREMITIES:  no cyanosis,clubbing or edema  SKIN:  No rash/erythema/ecchymoses/petechiae/wounds/abscess/warm/dry  NEURO:  Alert, oriented, no asterixis, no tremor, no encephalopathy      LABS:                        9.3    5.22  )-----------( 332      ( 31 Jan 2023 08:13 )             28.8     01-30    144  |  109<H>  |  18  ----------------------------<  76  3.7   |  28  |  1.00    Ca    8.3<L>      30 Jan 2023 06:00      PT/INR - ( 30 Jan 2023 06:00 )   PT: 38.8 sec;   INR: 3.25 ratio             amylase   lipase  RADIOLOGY & ADDITIONAL TESTS:

## 2023-01-31 NOTE — CHART NOTE - NSCHARTNOTEFT_GEN_A_CORE
Assessment: Per H&P "This is a 76 y/o F with PMH of Dyslipidemia, Rheumatic MS, A. Fib on Coumadin, s/p Micra, CVA 10 weeks ago s/p endovascular therapy, and Osteoporosis, who presented with passing black stools. Patient was taking a shower earlier today when she suddenly felt dizzy & weak, was able to manage to walk to the sofa without passing out, was witnessed to be diaphoretic & pale, and around 3 pm she went to the bathroom where she got a liquid BM with black stools, no abdominal pain, reports nausea without vomiting, no further BMs since then, no history of similar episode in the past but had an endoscopy about a year ago that revealed gastritis. At the ED her INR was 4.93, no reported bleeding from any other source."    1/31  Pt seen for nutrition follow-up. Writer fluent in Mandarin, declined  at this time. Visited patient in room, previously NPO/clear x4 days, diet advanced to regular today 1/31. Presents with fair-good appetite/po intake, consuming >50% of meals. Denies n/v/d/c, last BM today. Pertinent medications/nutrition labs reviewed; receiving LR in house. Educated on low-fiber diet (short-term), assisted in meal selection. Pt receptive, no nutrition related questions at this time. RD to continue to monitor nutrition status per protocol.     Factors impacting intake: [x ] none [ ] nausea  [ ] vomiting [ ] diarrhea [ ] constipation  [ ]chewing problems [ ] swallowing issues  [ ] other:     Diet Prescription: Diet, Regular (01-31-23 @ 08:49)    Intake: fair    Daily   % Weight Change    Pertinent Medications: MEDICATIONS  (STANDING):  lactated ringers. 1000 milliLiter(s) (75 mL/Hr) IV Continuous <Continuous>  pantoprazole    Tablet 40 milliGRAM(s) Oral every 12 hours  sucralfate 1 Gram(s) Oral every 6 hours    MEDICATIONS  (PRN):    Pertinent Labs: 01-30 Na144 mmol/L Glu 76 mg/dL K+ 3.7 mmol/L Cr  1.00 mg/dL BUN 18 mg/dL 01-27 Alb 2.9 g/dL<L>     CAPILLARY BLOOD GLUCOSE    No edema or pressure injury documented per flowsheets. Gastric ulcer noted      Estimated Needs:   [ x] no change since previous assessment  [ ] recalculated:     Previous Nutrition Diagnosis:   [ ] Inadequate Energy Intake [x ]Inadequate Oral Intake [ ] Excessive Energy Intake   [ ] Underweight [ ] Increased Nutrient Needs [ ] Overweight/Obesity [ ] Altered Nutrition related lab values  [ ] Altered GI Function [ ] Unintended Weight Loss [ ] Food & Nutrition Related Knowledge Deficit [ ] Malnutrition     Nutrition Diagnosis is [x ] ongoing  [ ] resolved [ ] not applicable     New Nutrition Diagnosis: [ x] not applicable       Interventions: continue with current diet order, encourage po intake as needed. Reinforce diet education.  Recommend  [ ] Change Diet To:  [ ] Nutrition Supplement  [ ] Nutrition Support  [ ] Other:     Monitoring and Evaluation:   [X ] Intake [ x ] Tolerance to diet prescription [ x ] weights [ x ] labs[ x ] follow up per protocol  [ ] other:
s/p  upper gastrointestinal endoscopy with biopsy and clip placemewnt  gastric ulcer  gastritis   hiatal hernia    plan  adv to clears  check cbc  restart a/c  ppi drip for 72 hours  gerd precautions  carafate 1 gr q 6 hours  f/u biopsy    Advanced care planning was discussed with patient and family.  Advanced care planning forms were reviewed and discussed.  Risks, benefits and alternatives of gastroenterologic procedures were discussed in detail and all questions were answered.    30 minutes spent.

## 2023-01-31 NOTE — PROGRESS NOTE ADULT - SUBJECTIVE AND OBJECTIVE BOX
Subjective: Patient seen and examined. EGD reveals Gastric Ulcer. Clip placed. No BM.     MEDICATIONS  (STANDING):  lactated ringers. 1000 milliLiter(s) (75 mL/Hr) IV Continuous <Continuous>  lactated ringers. 1000 milliLiter(s) (75 mL/Hr) IV Continuous <Continuous>  pantoprazole Infusion 8 mG/Hr (10 mL/Hr) IV Continuous <Continuous>  sucralfate 1 Gram(s) Oral every 6 hours    MEDICATIONS  (PRN):  ondansetron Injectable 4 milliGRAM(s) IV Push once PRN Nausea and/or Vomiting      Allergies    Allergy Status Unknown    Intolerances        Vital Signs Last 24 Hrs  T(C): 36.6 (31 Jan 2023 06:55), Max: 36.7 (30 Jan 2023 15:27)  T(F): 97.8 (31 Jan 2023 06:55), Max: 98.1 (30 Jan 2023 15:27)  HR: 83 (31 Jan 2023 06:55) (70 - 86)  BP: 120/74 (31 Jan 2023 06:55) (105/67 - 131/66)  BP(mean): --  RR: 16 (31 Jan 2023 06:55) (15 - 21)  SpO2: 98% (31 Jan 2023 06:55) (94% - 100%)    Parameters below as of 31 Jan 2023 06:55  Patient On (Oxygen Delivery Method): room air        PHYSICAL EXAM:  GENERAL: NAD, well-groomed, well-developed  HEAD:  Atraumatic, Normocephalic  ENMT: Moist mucous membranes,   NECK: Supple, No JVD, Normal thyroid  NERVOUS SYSTEM:  All 4 extremities mobile, no gross sensory deficits.   CHEST/LUNG: Clear to auscultation bilaterally; No rales, rhonchi, wheezing, or rubs  HEART: Regular rate and rhythm; No murmurs, rubs, or gallops  ABDOMEN: Soft, Nontender, Nondistended; Bowel sounds present  EXTREMITIES:  2+ Peripheral Pulses, No clubbing, cyanosis, or edema      LABS:                        9.3    5.22  )-----------( 332      ( 31 Jan 2023 08:13 )             28.8       Ca    8.3        30 Jan 2023 06:00      PT/INR - ( 30 Jan 2023 06:00 )   PT: 38.8 sec;   INR: 3.25 ratio             CAPILLARY BLOOD GLUCOSE          RADIOLOGY & ADDITIONAL TESTS:    Imaging Personally Reviewed:  [ ] YES     Consultant(s) Notes Reviewed:      Care Discussed with Consultants/Other Providers:    Advanced Directives: [ ] DNR  [ ] No feeding tube  [ ] MOLST in chart  [ ] MOLST completed today  [ ] Unknown
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
## 2023-01-31 NOTE — PROGRESS NOTE ADULT - ASSESSMENT
gu  melena  on a/c    plan  in and out  ppi drip  carafate 1 gr po q 6hours  gerd precautions  f/u bx  restart a/c    Advanced care planning was discussed with patient and family.  Advanced care planning forms were reviewed and discussed.  Risks, benefits and alternatives of gastroenterologic procedures were discussed in detail and all questions were answered.    30 minutes spent.

## 2023-01-31 NOTE — PROGRESS NOTE ADULT - ASSESSMENT
77F HLD, Atrial Fibrillation, Rheumatic MS and recent CVA admitted for GI Bleed    GI Bleed / Acute Blood Loss Anemia / S/P EGD 1/30/23  Gastric Ulcer and S/P Clipping   Patient on Coumadin and INR was supratherapeutic   Continue PPI BID and Carfate Q6H  Will recheck INR and dose Coumadin accordingly and observe for further bleeding episodes  GI Consult noted     Atrial Fibrillation / Rheumatic MS / HLD   Has pacemaker and Atrial Fibrillation on ECG   Echo 11/22 with normal LV systolic function, rheumatic mitral stenosis  Holding Warfarin given GI Bleed  Cardiology Consult noted     Diet  Clears    DVT Prophylaxis  Hold all agents    Disposition   Discharge planning pending hospital course

## 2023-02-01 LAB
ALBUMIN SERPL ELPH-MCNC: 2.9 G/DL — LOW (ref 3.3–5)
ALP SERPL-CCNC: 92 U/L — SIGNIFICANT CHANGE UP (ref 30–120)
ALT FLD-CCNC: 21 U/L DA — SIGNIFICANT CHANGE UP (ref 10–60)
ANION GAP SERPL CALC-SCNC: 6 MMOL/L — SIGNIFICANT CHANGE UP (ref 5–17)
AST SERPL-CCNC: 26 U/L — SIGNIFICANT CHANGE UP (ref 10–40)
BASOPHILS # BLD AUTO: 0.01 K/UL — SIGNIFICANT CHANGE UP (ref 0–0.2)
BASOPHILS NFR BLD AUTO: 0.2 % — SIGNIFICANT CHANGE UP (ref 0–2)
BILIRUB SERPL-MCNC: 0.6 MG/DL — SIGNIFICANT CHANGE UP (ref 0.2–1.2)
BUN SERPL-MCNC: 15 MG/DL — SIGNIFICANT CHANGE UP (ref 7–23)
CALCIUM SERPL-MCNC: 8.7 MG/DL — SIGNIFICANT CHANGE UP (ref 8.4–10.5)
CHLORIDE SERPL-SCNC: 103 MMOL/L — SIGNIFICANT CHANGE UP (ref 96–108)
CO2 SERPL-SCNC: 31 MMOL/L — SIGNIFICANT CHANGE UP (ref 22–31)
CREAT SERPL-MCNC: 1.02 MG/DL — SIGNIFICANT CHANGE UP (ref 0.5–1.3)
EGFR: 56 ML/MIN/1.73M2 — LOW
EOSINOPHIL # BLD AUTO: 0.1 K/UL — SIGNIFICANT CHANGE UP (ref 0–0.5)
EOSINOPHIL NFR BLD AUTO: 1.7 % — SIGNIFICANT CHANGE UP (ref 0–6)
GLUCOSE SERPL-MCNC: 99 MG/DL — SIGNIFICANT CHANGE UP (ref 70–99)
HCT VFR BLD CALC: 30.8 % — LOW (ref 34.5–45)
HGB BLD-MCNC: 9.6 G/DL — LOW (ref 11.5–15.5)
IMM GRANULOCYTES NFR BLD AUTO: 0.3 % — SIGNIFICANT CHANGE UP (ref 0–0.9)
INR BLD: 2.72 RATIO — HIGH (ref 0.88–1.16)
LYMPHOCYTES # BLD AUTO: 0.98 K/UL — LOW (ref 1–3.3)
LYMPHOCYTES # BLD AUTO: 16.3 % — SIGNIFICANT CHANGE UP (ref 13–44)
MCHC RBC-ENTMCNC: 27.8 PG — SIGNIFICANT CHANGE UP (ref 27–34)
MCHC RBC-ENTMCNC: 31.2 GM/DL — LOW (ref 32–36)
MCV RBC AUTO: 89.3 FL — SIGNIFICANT CHANGE UP (ref 80–100)
MONOCYTES # BLD AUTO: 0.39 K/UL — SIGNIFICANT CHANGE UP (ref 0–0.9)
MONOCYTES NFR BLD AUTO: 6.5 % — SIGNIFICANT CHANGE UP (ref 2–14)
NEUTROPHILS # BLD AUTO: 4.5 K/UL — SIGNIFICANT CHANGE UP (ref 1.8–7.4)
NEUTROPHILS NFR BLD AUTO: 75 % — SIGNIFICANT CHANGE UP (ref 43–77)
NRBC # BLD: 0 /100 WBCS — SIGNIFICANT CHANGE UP (ref 0–0)
PLATELET # BLD AUTO: 362 K/UL — SIGNIFICANT CHANGE UP (ref 150–400)
POTASSIUM SERPL-MCNC: 3.6 MMOL/L — SIGNIFICANT CHANGE UP (ref 3.5–5.3)
POTASSIUM SERPL-SCNC: 3.6 MMOL/L — SIGNIFICANT CHANGE UP (ref 3.5–5.3)
PROT SERPL-MCNC: 5.6 G/DL — LOW (ref 6–8.3)
PROTHROM AB SERPL-ACNC: 31.6 SEC — HIGH (ref 10.5–13.4)
RBC # BLD: 3.45 M/UL — LOW (ref 3.8–5.2)
RBC # FLD: 14.6 % — HIGH (ref 10.3–14.5)
SODIUM SERPL-SCNC: 140 MMOL/L — SIGNIFICANT CHANGE UP (ref 135–145)
WBC # BLD: 6 K/UL — SIGNIFICANT CHANGE UP (ref 3.8–10.5)
WBC # FLD AUTO: 6 K/UL — SIGNIFICANT CHANGE UP (ref 3.8–10.5)

## 2023-02-01 PROCEDURE — 99232 SBSQ HOSP IP/OBS MODERATE 35: CPT

## 2023-02-01 RX ORDER — WARFARIN SODIUM 2.5 MG/1
2 TABLET ORAL ONCE
Refills: 0 | Status: COMPLETED | OUTPATIENT
Start: 2023-02-01 | End: 2023-02-01

## 2023-02-01 RX ADMIN — WARFARIN SODIUM 2 MILLIGRAM(S): 2.5 TABLET ORAL at 21:17

## 2023-02-01 RX ADMIN — PANTOPRAZOLE SODIUM 40 MILLIGRAM(S): 20 TABLET, DELAYED RELEASE ORAL at 18:24

## 2023-02-01 RX ADMIN — Medication 1 GRAM(S): at 11:16

## 2023-02-01 RX ADMIN — Medication 1 GRAM(S): at 05:09

## 2023-02-01 RX ADMIN — Medication 1 GRAM(S): at 23:16

## 2023-02-01 RX ADMIN — Medication 1 GRAM(S): at 00:36

## 2023-02-01 RX ADMIN — Medication 1 GRAM(S): at 18:24

## 2023-02-01 RX ADMIN — PANTOPRAZOLE SODIUM 40 MILLIGRAM(S): 20 TABLET, DELAYED RELEASE ORAL at 05:09

## 2023-02-01 NOTE — PROGRESS NOTE ADULT - SUBJECTIVE AND OBJECTIVE BOX
Chief Complaint: GI bleed    Interval Events: No events overnight.    Review of Systems:  General: No fevers, chills, weight gain  Skin: No rashes, color changes  Cardiovascular: No chest pain, orthopnea  Respiratory: No shortness of breath, cough  Gastrointestinal: No nausea, abdominal pain  Genitourinary: No incontinence, pain with urination  Musculoskeletal: No pain, swelling, decreased range of motion  Neurological: No headache, weakness  Psychiatric: No depression, anxiety  Endocrine: No weight gain, increased thirst  All other systems are comprehensively negative.    Physical Exam:  Vital Signs Last 24 Hrs  T(C): 36.3 (01 Feb 2023 05:11), Max: 36.7 (31 Jan 2023 09:53)  T(F): 97.4 (01 Feb 2023 05:11), Max: 98.1 (31 Jan 2023 09:53)  HR: 79 (01 Feb 2023 05:11) (69 - 79)  BP: 108/68 (01 Feb 2023 05:11) (99/63 - 138/68)  BP(mean): --  RR: 18 (01 Feb 2023 05:11) (16 - 18)  SpO2: 97% (01 Feb 2023 05:11) (95% - 98%)  Parameters below as of 01 Feb 2023 05:11  Patient On (Oxygen Delivery Method): room air  General: NAD  HEENT: MMM  Neck: No JVD, no carotid bruit  Lungs: CTAB  CV: Irregular, nl S1/S2, no M/R/G  Abdomen: S/NT/ND, +BS  Extremities: No LE edema, no cyanosis  Neuro: AAOx3, non-focal  Skin: No rash    Labs:    02-01    140  |  103  |  15  ----------------------------<  99  3.6   |  31  |  1.02    Ca    8.7      01 Feb 2023 08:11    TPro  5.6<L>  /  Alb  2.9<L>  /  TBili  0.6  /  DBili  x   /  AST  26  /  ALT  21  /  AlkPhos  92  02-01                        9.6    6.00  )-----------( 362      ( 01 Feb 2023 08:11 )             30.8       ECG/Telemetry: AF

## 2023-02-01 NOTE — CASE MANAGEMENT PROGRESS NOTE - NSCMPROGRESSNOTE_GEN_ALL_CORE
RN/CM engaged  services ID# [id number],  is Ivelisse. CM met with pt, explained role. Pt is anticipating no skilled needs upon transition home, independent prior to admission. Pt stated she resides with her very supportive family and once she is cleared for DC home, her son will pick her up and transport her. Pt stated that MD spoke to her today and stated that she is NOT yet DC ready, will observe how she responds to restarting anticoagulation. Pt has verbalized to this CM a request for MD to re-eval her continued need for IV Fluids now that she has been advanced to a regular diet- this was relayed to MD who will review pt's case. Staff on unit apprised re: all above. CM remains available.

## 2023-02-01 NOTE — PROGRESS NOTE ADULT - SUBJECTIVE AND OBJECTIVE BOX
INTERVAL HPI/OVERNIGHT EVENTS:  No new overnight event.  No N/V/D.  Tolerating diet.  no bleding    Allergies    Allergy Status Unknown    Intolerances    General:  No wt loss, fevers, chills, night sweats, fatigue,   Eyes:  Good vision, no reported pain  ENT:  No sore throat, pain, runny nose, dysphagia  CV:  No pain, palpitations, hypo/hypertension  Resp:  No dyspnea, cough, tachypnea, wheezing  GI:  No pain, No nausea, No vomiting, No diarrhea, No constipation, No weight loss, No fever, No pruritis, No rectal bleeding, No tarry stools, No dysphagia,  :  No pain, bleeding, incontinence, nocturia  Muscle:  No pain, weakness  Neuro:  No weakness, tingling, memory problems  Psych:  No fatigue, insomnia, mood problems, depression  Endocrine:  No polyuria, polydipsia, cold/heat intolerance  Heme:  No petechiae, ecchymosis, easy bruisability  Skin:  No rash, tattoos, scars, edema      PHYSICAL EXAM:   Vital Signs:  Vital Signs Last 24 Hrs  T(C): 36.5 (2023 10:29), Max: 36.7 (2023 13:28)  T(F): 97.7 (2023 10:29), Max: 98 (2023 13:28)  HR: 87 (2023 10:29) (69 - 87)  BP: 96/64 (2023 10:29) (96/64 - 124/56)  BP(mean): --  RR: 16 (2023 10:29) (16 - 18)  SpO2: 97% (2023 10:29) (95% - 97%)    Parameters below as of 2023 10:29  Patient On (Oxygen Delivery Method): room air      Daily     Daily Weight in k.5 (2023 05:11)I&O's Summary    2023 07:01  -  2023 07:00  --------------------------------------------------------  IN: 1985 mL / OUT: 0 mL / NET:  mL    2023 07:01  -  2023 13:05  --------------------------------------------------------  IN: 450 mL / OUT: 0 mL / NET: 450 mL        GENERAL:  Appears stated age, well-groomed, well-nourished, no distress  HEENT:  NC/AT,  conjunctivae clear and pink, no thyromegaly, nodules, adenopathy, no JVD, sclera -anicteric  CHEST:  Full & symmetric excursion, no increased effort, breath sounds clear  HEART:  Regular rhythm, S1, S2, no murmur/rub/S3/S4, no abdominal bruit, no edema  ABDOMEN:  Soft, non-tender, non-distended, normoactive bowel sounds,  no masses ,no hepato-splenomegaly, no signs of chronic liver disease  EXTEREMITIES:  no cyanosis,clubbing or edema  SKIN:  No rash/erythema/ecchymoses/petechiae/wounds/abscess/warm/dry  NEURO:  Alert, oriented, no asterixis, no tremor, no encephalopathy      LABS:                        9.6    6.00  )-----------( 362      ( 2023 08:11 )             30.8     02-    140  |  103  |  15  ----------------------------<  99  3.6   |  31  |  1.02    Ca    8.7      2023 08:11    TPro  5.6<L>  /  Alb  2.9<L>  /  TBili  0.6  /  DBili  x   /  AST  26  /  ALT  21  /  AlkPhos  92  02-    PT/INR - ( 2023 08:11 )   PT: 31.6 sec;   INR: 2.72 ratio             amylase   lipase  RADIOLOGY & ADDITIONAL TESTS:

## 2023-02-01 NOTE — PROGRESS NOTE ADULT - ASSESSMENT
77F HLD, Atrial Fibrillation, Rheumatic MS and recent CVA admitted for GI Bleed    GI Bleed / Acute Blood Loss Anemia / S/P EGD 1/30/23  Gastric Ulcer and S/P Clipping   Patient resumed Coumadin and INR Target 2.0 to 3.0  Continue PPI BID and Carfate Q6H  Will recheck INR and dose Coumadin accordingly and observe for further bleeding episodes  GI Consult noted     Atrial Fibrillation / Rheumatic MS / HLD   Has pacemaker and Atrial Fibrillation on ECG   Echo 11/22 with normal LV systolic function, rheumatic mitral stenosis  Warfarin 2.5mg to be dosed tonight  Cardiology Consult noted     Diet  Clears    DVT Prophylaxis  Hold all agents    Disposition   Discharge planning pending hospital course

## 2023-02-01 NOTE — PROGRESS NOTE ADULT - SUBJECTIVE AND OBJECTIVE BOX
Subjective: Patient seen and examined.  Having dark black BM's still.  HgB stable for now.  INR finally below 3.0 for first time today. No questions.  Does want to go home. Tolerating regular diet.     MEDICATIONS  (STANDING):  pantoprazole    Tablet 40 milliGRAM(s) Oral every 12 hours  sucralfate 1 Gram(s) Oral every 6 hours    MEDICATIONS  (PRN):      Allergies    Allergy Status Unknown    Intolerances        Vital Signs Last 24 Hrs  T(C): 36.7 (01 Feb 2023 21:11), Max: 36.9 (01 Feb 2023 14:31)  T(F): 98.1 (01 Feb 2023 21:11), Max: 98.4 (01 Feb 2023 14:31)  HR: 76 (01 Feb 2023 21:11) (69 - 88)  BP: 119/78 (01 Feb 2023 21:11) (96/64 - 165/85)  BP(mean): --  RR: 16 (01 Feb 2023 21:11) (16 - 18)  SpO2: 93% (01 Feb 2023 21:11) (93% - 98%)    Parameters below as of 01 Feb 2023 21:11  Patient On (Oxygen Delivery Method): room air        PHYSICAL EXAM:  GENERAL: NAD, well-groomed, well-developed  HEAD:  Atraumatic, Normocephalic  ENMT: Moist mucous membranes,   NECK: Supple, No JVD, Normal thyroid  NERVOUS SYSTEM:  All 4 extremities mobile, no gross sensory deficits.   CHEST/LUNG: Clear to auscultation bilaterally; No rales, rhonchi, wheezing, or rubs  HEART: Regular rate and rhythm; No murmurs, rubs, or gallops  ABDOMEN: Soft, Nontender, Nondistended; Bowel sounds present  EXTREMITIES:  2+ Peripheral Pulses, No clubbing, cyanosis, or edema      LABS:                        9.6    6.00  )-----------( 362      ( 01 Feb 2023 08:11 )             30.8     01 Feb 2023 08:11    140    |  103    |  15     ----------------------------<  99     3.6     |  31     |  1.02     Ca    8.7        01 Feb 2023 08:11    TPro  5.6    /  Alb  2.9    /  TBili  0.6    /  DBili  x      /  AST  26     /  ALT  21     /  AlkPhos  92     01 Feb 2023 08:11    PT/INR - ( 01 Feb 2023 08:11 )   PT: 31.6 sec;   INR: 2.72 ratio             CAPILLARY BLOOD GLUCOSE          RADIOLOGY & ADDITIONAL TESTS:    Imaging Personally Reviewed:  [ ] YES     Consultant(s) Notes Reviewed:      Care Discussed with Consultants/Other Providers:    Advanced Directives: [ ] DNR  [ ] No feeding tube  [ ] MOLST in chart  [ ] MOLST completed today  [ ] Unknown

## 2023-02-02 ENCOUNTER — TRANSCRIPTION ENCOUNTER (OUTPATIENT)
Age: 78
End: 2023-02-02

## 2023-02-02 VITALS
OXYGEN SATURATION: 99 % | DIASTOLIC BLOOD PRESSURE: 77 MMHG | RESPIRATION RATE: 16 BRPM | HEART RATE: 80 BPM | SYSTOLIC BLOOD PRESSURE: 119 MMHG | TEMPERATURE: 98 F

## 2023-02-02 LAB
ANION GAP SERPL CALC-SCNC: 7 MMOL/L — SIGNIFICANT CHANGE UP (ref 5–17)
APTT BLD: 37.7 SEC — HIGH (ref 27.5–35.5)
BUN SERPL-MCNC: 11 MG/DL — SIGNIFICANT CHANGE UP (ref 7–23)
CALCIUM SERPL-MCNC: 8.6 MG/DL — SIGNIFICANT CHANGE UP (ref 8.4–10.5)
CHLORIDE SERPL-SCNC: 109 MMOL/L — HIGH (ref 96–108)
CO2 SERPL-SCNC: 30 MMOL/L — SIGNIFICANT CHANGE UP (ref 22–31)
CREAT SERPL-MCNC: 0.96 MG/DL — SIGNIFICANT CHANGE UP (ref 0.5–1.3)
EGFR: 61 ML/MIN/1.73M2 — SIGNIFICANT CHANGE UP
GLUCOSE SERPL-MCNC: 94 MG/DL — SIGNIFICANT CHANGE UP (ref 70–99)
HCT VFR BLD CALC: 31.7 % — LOW (ref 34.5–45)
HGB BLD-MCNC: 10 G/DL — LOW (ref 11.5–15.5)
INR BLD: 1.86 RATIO — HIGH (ref 0.88–1.16)
MCHC RBC-ENTMCNC: 28.5 PG — SIGNIFICANT CHANGE UP (ref 27–34)
MCHC RBC-ENTMCNC: 31.5 GM/DL — LOW (ref 32–36)
MCV RBC AUTO: 90.3 FL — SIGNIFICANT CHANGE UP (ref 80–100)
NRBC # BLD: 0 /100 WBCS — SIGNIFICANT CHANGE UP (ref 0–0)
PLATELET # BLD AUTO: 364 K/UL — SIGNIFICANT CHANGE UP (ref 150–400)
POTASSIUM SERPL-MCNC: 3.5 MMOL/L — SIGNIFICANT CHANGE UP (ref 3.5–5.3)
POTASSIUM SERPL-SCNC: 3.5 MMOL/L — SIGNIFICANT CHANGE UP (ref 3.5–5.3)
PROTHROM AB SERPL-ACNC: 22.1 SEC — HIGH (ref 10.5–13.4)
RBC # BLD: 3.51 M/UL — LOW (ref 3.8–5.2)
RBC # FLD: 15.3 % — HIGH (ref 10.3–14.5)
SODIUM SERPL-SCNC: 146 MMOL/L — HIGH (ref 135–145)
WBC # BLD: 6.01 K/UL — SIGNIFICANT CHANGE UP (ref 3.8–10.5)
WBC # FLD AUTO: 6.01 K/UL — SIGNIFICANT CHANGE UP (ref 3.8–10.5)

## 2023-02-02 PROCEDURE — 88312 SPECIAL STAINS GROUP 1: CPT

## 2023-02-02 PROCEDURE — 96374 THER/PROPH/DIAG INJ IV PUSH: CPT

## 2023-02-02 PROCEDURE — 87635 SARS-COV-2 COVID-19 AMP PRB: CPT

## 2023-02-02 PROCEDURE — 85610 PROTHROMBIN TIME: CPT

## 2023-02-02 PROCEDURE — 86677 HELICOBACTER PYLORI ANTIBODY: CPT

## 2023-02-02 PROCEDURE — 88305 TISSUE EXAM BY PATHOLOGIST: CPT

## 2023-02-02 PROCEDURE — 99239 HOSP IP/OBS DSCHRG MGMT >30: CPT

## 2023-02-02 PROCEDURE — 93005 ELECTROCARDIOGRAM TRACING: CPT

## 2023-02-02 PROCEDURE — 86901 BLOOD TYPING SEROLOGIC RH(D): CPT

## 2023-02-02 PROCEDURE — 71045 X-RAY EXAM CHEST 1 VIEW: CPT

## 2023-02-02 PROCEDURE — 85025 COMPLETE CBC W/AUTO DIFF WBC: CPT

## 2023-02-02 PROCEDURE — C1889: CPT

## 2023-02-02 PROCEDURE — 80053 COMPREHEN METABOLIC PANEL: CPT

## 2023-02-02 PROCEDURE — 82272 OCCULT BLD FECES 1-3 TESTS: CPT

## 2023-02-02 PROCEDURE — 96376 TX/PRO/DX INJ SAME DRUG ADON: CPT

## 2023-02-02 PROCEDURE — 86900 BLOOD TYPING SEROLOGIC ABO: CPT

## 2023-02-02 PROCEDURE — 85018 HEMOGLOBIN: CPT

## 2023-02-02 PROCEDURE — 85014 HEMATOCRIT: CPT

## 2023-02-02 PROCEDURE — 86850 RBC ANTIBODY SCREEN: CPT

## 2023-02-02 PROCEDURE — 85027 COMPLETE CBC AUTOMATED: CPT

## 2023-02-02 PROCEDURE — 80048 BASIC METABOLIC PNL TOTAL CA: CPT

## 2023-02-02 PROCEDURE — 85730 THROMBOPLASTIN TIME PARTIAL: CPT

## 2023-02-02 PROCEDURE — 36415 COLL VENOUS BLD VENIPUNCTURE: CPT

## 2023-02-02 PROCEDURE — 84484 ASSAY OF TROPONIN QUANT: CPT

## 2023-02-02 PROCEDURE — 99285 EMERGENCY DEPT VISIT HI MDM: CPT

## 2023-02-02 RX ORDER — PANTOPRAZOLE SODIUM 20 MG/1
1 TABLET, DELAYED RELEASE ORAL
Qty: 60 | Refills: 0
Start: 2023-02-02 | End: 2023-03-03

## 2023-02-02 RX ORDER — SUCRALFATE 1 G
1 TABLET ORAL
Qty: 120 | Refills: 0
Start: 2023-02-02 | End: 2023-03-03

## 2023-02-02 RX ADMIN — Medication 1 GRAM(S): at 11:52

## 2023-02-02 RX ADMIN — PANTOPRAZOLE SODIUM 40 MILLIGRAM(S): 20 TABLET, DELAYED RELEASE ORAL at 05:41

## 2023-02-02 RX ADMIN — Medication 1 GRAM(S): at 05:41

## 2023-02-02 NOTE — PROGRESS NOTE ADULT - REASON FOR ADMISSION
Black stools.

## 2023-02-02 NOTE — DISCHARGE NOTE PROVIDER - HOSPITAL COURSE
77F HLD, Atrial Fibrillation, Rheumatic MS and recent CVA admitted for GI Bleed    GI Bleed / Acute Blood Loss Anemia / S/P EGD 1/30/23  Gastric Ulcer and S/P Clipping   Patient resumed Coumadin and INR Target 2.0 to 3.0  Continue PPI BID and Carfate Q6H  INR today is sub-therapeutic and will need resume Coumadin at home  Will need to check INR frequently for next 2 weeks and follow up with PCP     Atrial Fibrillation / Rheumatic MS / HLD   Has pacemaker and Atrial Fibrillation on ECG   Echo 11/22 with normal LV systolic function, rheumatic mitral stenosis  Warfarin 2.5mg to be dosed tonight  Cardiology Consult noted

## 2023-02-02 NOTE — DISCHARGE NOTE NURSING/CASE MANAGEMENT/SOCIAL WORK - PATIENT PORTAL LINK FT
You can access the FollowMyHealth Patient Portal offered by [facility] by registering at the following website: [email or web address]. By joining [employer]’s FollowMyHealth portal, you will also be able to view your health information using other applications (apps) compatible with our system.

## 2023-02-02 NOTE — DISCHARGE NOTE NURSING/CASE MANAGEMENT/SOCIAL WORK - NSDCPEFALRISK_GEN_ALL_CORE
For information on Fall & Injury Prevention, visit: [email or web address] OR  [email or web address] OR  https://www.cdc.gov/steadi/patient.html

## 2023-02-02 NOTE — DISCHARGE NOTE PROVIDER - CARE PROVIDER_API CALL
Henry Ro)  Gastroenterology  [address] [place] [address]  Phone: [phone]  Fax: [phone]  Follow Up Time:

## 2023-02-02 NOTE — PROGRESS NOTE ADULT - ASSESSMENT
gu  melena  on a/c    plan  in and out  ppi bid  d/c home and outpt follow up as outpt  carafate 1 gr po q 6hours  gerd precautions  f/u bx  restart a/c    Advanced care planning was discussed with patient and family.  Advanced care planning forms were reviewed and discussed.  Risks, benefits and alternatives of gastroenterologic procedures were discussed in detail and all questions were answered.    30 minutes spent.

## 2023-02-02 NOTE — PROGRESS NOTE ADULT - SUBJECTIVE AND OBJECTIVE BOX
Chief Complaint: GI bleed    Interval Events: No events overnight. Sleeping.    Review of Systems:  General: No fevers, chills, weight gain  Skin: No rashes, color changes  Cardiovascular: No chest pain, orthopnea  Respiratory: No shortness of breath, cough  Gastrointestinal: No nausea, abdominal pain  Genitourinary: No incontinence, pain with urination  Musculoskeletal: No pain, swelling, decreased range of motion  Neurological: No headache, weakness  Psychiatric: No depression, anxiety  Endocrine: No weight gain, increased thirst  All other systems are comprehensively negative.    Physical Exam:  Vital Signs Last 24 Hrs  T(C): 36.4 (02 Feb 2023 05:10), Max: 36.9 (01 Feb 2023 14:31)  T(F): 97.5 (02 Feb 2023 05:10), Max: 98.4 (01 Feb 2023 14:31)  HR: 68 (02 Feb 2023 05:10) (66 - 88)  BP: 118/73 (02 Feb 2023 05:10) (96/64 - 165/85)  BP(mean): --  RR: 18 (02 Feb 2023 05:10) (16 - 18)  SpO2: 96% (02 Feb 2023 05:10) (93% - 98%)  Parameters below as of 02 Feb 2023 05:10  Patient On (Oxygen Delivery Method): room air  General: NAD  HEENT: MMM  Neck: No JVD, no carotid bruit  Lungs: CTAB  CV: Irregular, nl S1/S2, no M/R/G  Abdomen: S/NT/ND, +BS  Extremities: No LE edema, no cyanosis  Neuro: AAOx3, non-focal  Skin: No rash    Labs:    02-02    146<H>  |  109<H>  |  11  ----------------------------<  94  3.5   |  30  |  0.96    Ca    8.6      02 Feb 2023 06:00    TPro  5.6<L>  /  Alb  2.9<L>  /  TBili  0.6  /  DBili  x   /  AST  26  /  ALT  21  /  AlkPhos  92  02-01                        10.0   6.01  )-----------( 364      ( 02 Feb 2023 06:00 )             31.7     ECG/Telemetry: AF

## 2023-02-02 NOTE — DISCHARGE NOTE PROVIDER - NSDCMRMEDTOKEN_GEN_ALL_CORE_FT
pantoprazole 40 mg oral delayed release tablet: 1 tab(s) orally every 12 hours  sucralfate 1 g oral tablet: 1 tab(s) orally every 6 hours  warfarin 2 mg oral tablet: 1 tab(s) orally once a day

## 2023-02-02 NOTE — PROGRESS NOTE ADULT - PROVIDER SPECIALTY LIST ADULT
Cardiology
Gastroenterology
Gastroenterology
Cardiology
Cardiology
Hospitalist
Cardiology
Cardiology
Gastroenterology
Hospitalist

## 2023-02-02 NOTE — CASE MANAGEMENT PROGRESS NOTE - NSCMPROGRESSNOTE_GEN_ALL_CORE
RN/CM notified by MD that pt is cleared for transition home today, will follow-up with community MD with her need for INR lab draws. Pt is very alert, self-directing, aware of DC today 02/03/2023 and agreeable, IMM has been reviewed with pt on 02/01/2023- services ID# [id number]. Son will transport pt home.

## 2023-02-02 NOTE — DISCHARGE NOTE PROVIDER - NSDCCPCAREPLAN_GEN_ALL_CORE_FT
PRINCIPAL DISCHARGE DIAGNOSIS  Diagnosis: GI bleeding  Assessment and Plan of Treatment: You were found to have a Gastro-intestinal bleed.  This was due to an ulcer.  An endoscopy was done and Ulcer was clipped.  Your anticoagulation Warfarin was resumed. Your goal INR 2.0 to 3.0 and will need to have more frequent INR checks for the next 2 weeks.    If you notice any more bowel movements that are black please re-visit the Emergency Department.
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
## 2023-02-02 NOTE — PROGRESS NOTE ADULT - SUBJECTIVE AND OBJECTIVE BOX
INTERVAL HPI/OVERNIGHT EVENTS:  No new overnight event.  No N/V/D.  Tolerating diet.  ni melena hgb stable    Allergies    Allergy Status Unknown    Intolerances    General:  No wt loss, fevers, chills, night sweats, fatigue,   Eyes:  Good vision, no reported pain  ENT:  No sore throat, pain, runny nose, dysphagia  CV:  No pain, palpitations, hypo/hypertension  Resp:  No dyspnea, cough, tachypnea, wheezing  GI:  No pain, No nausea, No vomiting, No diarrhea, No constipation, No weight loss, No fever, No pruritis, No rectal bleeding, No tarry stools, No dysphagia,  :  No pain, bleeding, incontinence, nocturia  Muscle:  No pain, weakness  Neuro:  No weakness, tingling, memory problems  Psych:  No fatigue, insomnia, mood problems, depression  Endocrine:  No polyuria, polydipsia, cold/heat intolerance  Heme:  No petechiae, ecchymosis, easy bruisability  Skin:  No rash, tattoos, scars, edema      PHYSICAL EXAM:   Vital Signs:  Vital Signs Last 24 Hrs  T(C): 36.4 (02 Feb 2023 05:10), Max: 36.9 (01 Feb 2023 14:31)  T(F): 97.5 (02 Feb 2023 05:10), Max: 98.4 (01 Feb 2023 14:31)  HR: 68 (02 Feb 2023 05:10) (66 - 88)  BP: 118/73 (02 Feb 2023 05:10) (96/64 - 165/85)  BP(mean): --  RR: 18 (02 Feb 2023 05:10) (16 - 18)  SpO2: 96% (02 Feb 2023 05:10) (93% - 98%)    Parameters below as of 02 Feb 2023 05:10  Patient On (Oxygen Delivery Method): room air      Daily     Daily I&O's Summary    01 Feb 2023 07:01  -  02 Feb 2023 07:00  --------------------------------------------------------  IN: 825 mL / OUT: 0 mL / NET: 825 mL        GENERAL:  Appears stated age, well-groomed, well-nourished, no distress  HEENT:  NC/AT,  conjunctivae clear and pink, no thyromegaly, nodules, adenopathy, no JVD, sclera -anicteric  CHEST:  Full & symmetric excursion, no increased effort, breath sounds clear  HEART:  Regular rhythm, S1, S2, no murmur/rub/S3/S4, no abdominal bruit, no edema  ABDOMEN:  Soft, non-tender, non-distended, normoactive bowel sounds,  no masses ,no hepato-splenomegaly, no signs of chronic liver disease  EXTEREMITIES:  no cyanosis,clubbing or edema  SKIN:  No rash/erythema/ecchymoses/petechiae/wounds/abscess/warm/dry  NEURO:  Alert, oriented, no asterixis, no tremor, no encephalopathy      LABS:                        10.0   6.01  )-----------( 364      ( 02 Feb 2023 06:00 )             31.7     02-01    140  |  103  |  15  ----------------------------<  99  3.6   |  31  |  1.02    Ca    8.7      01 Feb 2023 08:11    TPro  5.6<L>  /  Alb  2.9<L>  /  TBili  0.6  /  DBili  x   /  AST  26  /  ALT  21  /  AlkPhos  92  02-01    PT/INR - ( 01 Feb 2023 08:11 )   PT: 31.6 sec;   INR: 2.72 ratio             amylase   lipase  RADIOLOGY & ADDITIONAL TESTS:

## 2023-02-03 RX ORDER — SUCRALFATE 1 G
1 TABLET ORAL
Qty: 120 | Refills: 0
Start: 2023-02-03 | End: 2023-03-04

## 2023-02-03 RX ORDER — PANTOPRAZOLE SODIUM 20 MG/1
1 TABLET, DELAYED RELEASE ORAL
Qty: 60 | Refills: 0
Start: 2023-02-03 | End: 2023-03-04

## 2023-02-28 NOTE — ED ADULT NURSE NOTE - LANGUAGE ASSISTANCE NEEDED
Impression: Other secondary cataract, left eye: H26.492. Plan: Discussed diagnosis with patient. Recommend Yag Capsulotomy. Risks and benefits discussed. Patient elects to proceed with laser surgery OS. Continue to monitor OD. No-Patient/Caregiver offered and refused free interpretation services.

## 2023-03-23 NOTE — PATIENT PROFILE ADULT - DO YOU LACK THE NECESSARY SUPPORT TO HELP YOU COPE WITH LIFE CHALLENGES?
Impression: S/P Cataract Extraction by phacoemulsification with IOL placement; ORA; Femto (LenSx) OS - 10 Days. Encounter for surgical aftercare following surgery on a sense organ  Z48.810. Plan: OK to proceed with second eye Discussed R/B/A to surgery, RL2. Patient voices understanding and wishes to proceed. CE/IOL OD. no

## 2023-04-16 NOTE — H&P ADULT - VTE RISK ASSESSMENT
[FreeTextEntry1] : elevated LFTs- recheck cmp, US abd\par htn- start losartan. Possible adverse effects discussed with pt. f/u to recheck bp\par obesity, snoring - sleep study\par HSV- valtrex prn <<--- Click to launch

## 2023-06-30 NOTE — CONSULT NOTE ADULT - PROVIDER SPECIALTY LIST ADULT
Cardiology Normal volume, rate, productivity, spontaneity and articulation Normal volume, rate, productivity, spontaneity and articulation Normal volume, rate, productivity, spontaneity and articulation Normal volume, rate, productivity, spontaneity and articulation Normal volume, rate, productivity, spontaneity and articulation Normal volume, rate, productivity, spontaneity and articulation Normal volume, rate, productivity, spontaneity and articulation

## 2023-09-05 ENCOUNTER — EMERGENCY (EMERGENCY)
Facility: HOSPITAL | Age: 78
LOS: 1 days | Discharge: HOME | End: 2023-09-05
Attending: EMERGENCY MEDICINE | Admitting: EMERGENCY MEDICINE
Payer: COMMERCIAL

## 2023-09-05 VITALS
SYSTOLIC BLOOD PRESSURE: 118 MMHG | OXYGEN SATURATION: 97 % | HEART RATE: 66 BPM | DIASTOLIC BLOOD PRESSURE: 71 MMHG | RESPIRATION RATE: 15 BRPM

## 2023-09-05 VITALS
SYSTOLIC BLOOD PRESSURE: 115 MMHG | RESPIRATION RATE: 16 BRPM | WEIGHT: 89.95 LBS | DIASTOLIC BLOOD PRESSURE: 66 MMHG | TEMPERATURE: 98 F | HEIGHT: 62.99 IN | HEART RATE: 68 BPM | OXYGEN SATURATION: 97 %

## 2023-09-05 DIAGNOSIS — Z95.0 PRESENCE OF CARDIAC PACEMAKER: Chronic | ICD-10-CM

## 2023-09-05 LAB
ALBUMIN SERPL ELPH-MCNC: 2.9 G/DL — LOW (ref 3.3–5)
ALP SERPL-CCNC: 111 U/L — SIGNIFICANT CHANGE UP (ref 30–120)
ALT FLD-CCNC: 16 U/L — SIGNIFICANT CHANGE UP (ref 10–60)
ANION GAP SERPL CALC-SCNC: 8 MMOL/L — SIGNIFICANT CHANGE UP (ref 5–17)
APTT BLD: 92.3 SEC — HIGH (ref 24.5–35.6)
AST SERPL-CCNC: 17 U/L — SIGNIFICANT CHANGE UP (ref 10–40)
BASOPHILS # BLD AUTO: 0.02 K/UL — SIGNIFICANT CHANGE UP (ref 0–0.2)
BASOPHILS NFR BLD AUTO: 0.3 % — SIGNIFICANT CHANGE UP (ref 0–2)
BILIRUB SERPL-MCNC: 0.3 MG/DL — SIGNIFICANT CHANGE UP (ref 0.2–1.2)
BUN SERPL-MCNC: 25 MG/DL — HIGH (ref 7–23)
CALCIUM SERPL-MCNC: 9.2 MG/DL — SIGNIFICANT CHANGE UP (ref 8.4–10.5)
CHLORIDE SERPL-SCNC: 103 MMOL/L — SIGNIFICANT CHANGE UP (ref 96–108)
CO2 SERPL-SCNC: 27 MMOL/L — SIGNIFICANT CHANGE UP (ref 22–31)
CREAT SERPL-MCNC: 0.86 MG/DL — SIGNIFICANT CHANGE UP (ref 0.5–1.3)
EGFR: 69 ML/MIN/1.73M2 — SIGNIFICANT CHANGE UP
EOSINOPHIL # BLD AUTO: 0.29 K/UL — SIGNIFICANT CHANGE UP (ref 0–0.5)
EOSINOPHIL NFR BLD AUTO: 4.2 % — SIGNIFICANT CHANGE UP (ref 0–6)
GLUCOSE SERPL-MCNC: 99 MG/DL — SIGNIFICANT CHANGE UP (ref 70–99)
HCT VFR BLD CALC: 36.7 % — SIGNIFICANT CHANGE UP (ref 34.5–45)
HGB BLD-MCNC: 11.5 G/DL — SIGNIFICANT CHANGE UP (ref 11.5–15.5)
IMM GRANULOCYTES NFR BLD AUTO: 0.4 % — SIGNIFICANT CHANGE UP (ref 0–0.9)
INR BLD: 10.81 RATIO — CRITICAL HIGH (ref 0.85–1.18)
LYMPHOCYTES # BLD AUTO: 1.26 K/UL — SIGNIFICANT CHANGE UP (ref 1–3.3)
LYMPHOCYTES # BLD AUTO: 18.3 % — SIGNIFICANT CHANGE UP (ref 13–44)
MCHC RBC-ENTMCNC: 27 PG — SIGNIFICANT CHANGE UP (ref 27–34)
MCHC RBC-ENTMCNC: 31.3 GM/DL — LOW (ref 32–36)
MCV RBC AUTO: 86.2 FL — SIGNIFICANT CHANGE UP (ref 80–100)
MONOCYTES # BLD AUTO: 0.59 K/UL — SIGNIFICANT CHANGE UP (ref 0–0.9)
MONOCYTES NFR BLD AUTO: 8.6 % — SIGNIFICANT CHANGE UP (ref 2–14)
NEUTROPHILS # BLD AUTO: 4.7 K/UL — SIGNIFICANT CHANGE UP (ref 1.8–7.4)
NEUTROPHILS NFR BLD AUTO: 68.2 % — SIGNIFICANT CHANGE UP (ref 43–77)
NRBC # BLD: 0 /100 WBCS — SIGNIFICANT CHANGE UP (ref 0–0)
PLATELET # BLD AUTO: 359 K/UL — SIGNIFICANT CHANGE UP (ref 150–400)
POTASSIUM SERPL-MCNC: 4.1 MMOL/L — SIGNIFICANT CHANGE UP (ref 3.5–5.3)
POTASSIUM SERPL-SCNC: 4.1 MMOL/L — SIGNIFICANT CHANGE UP (ref 3.5–5.3)
PROT SERPL-MCNC: 7.3 G/DL — SIGNIFICANT CHANGE UP (ref 6–8.3)
PROTHROM AB SERPL-ACNC: 109.9 SEC — HIGH (ref 9.5–13)
RBC # BLD: 4.26 M/UL — SIGNIFICANT CHANGE UP (ref 3.8–5.2)
RBC # FLD: 15.5 % — HIGH (ref 10.3–14.5)
SODIUM SERPL-SCNC: 138 MMOL/L — SIGNIFICANT CHANGE UP (ref 135–145)
WBC # BLD: 6.89 K/UL — SIGNIFICANT CHANGE UP (ref 3.8–10.5)
WBC # FLD AUTO: 6.89 K/UL — SIGNIFICANT CHANGE UP (ref 3.8–10.5)

## 2023-09-05 PROCEDURE — 85610 PROTHROMBIN TIME: CPT

## 2023-09-05 PROCEDURE — 85025 COMPLETE CBC W/AUTO DIFF WBC: CPT

## 2023-09-05 PROCEDURE — 85730 THROMBOPLASTIN TIME PARTIAL: CPT

## 2023-09-05 PROCEDURE — 80053 COMPREHEN METABOLIC PANEL: CPT

## 2023-09-05 PROCEDURE — 99285 EMERGENCY DEPT VISIT HI MDM: CPT

## 2023-09-05 PROCEDURE — 99283 EMERGENCY DEPT VISIT LOW MDM: CPT

## 2023-09-05 PROCEDURE — 36415 COLL VENOUS BLD VENIPUNCTURE: CPT

## 2023-09-05 RX ORDER — PHYTONADIONE (VIT K1) 5 MG
2.5 TABLET ORAL ONCE
Refills: 0 | Status: COMPLETED | OUTPATIENT
Start: 2023-09-05 | End: 2023-09-05

## 2023-09-05 RX ADMIN — Medication 2.5 MILLIGRAM(S): at 14:06

## 2023-09-05 NOTE — ED PROVIDER NOTE - CLINICAL SUMMARY MEDICAL DECISION MAKING FREE TEXT BOX
xlator #[id number]  Patient on coumadin for A fib referred by her doctor for elevated INR (8) when checked today. Patient denies any new bruising or bleeding or any other complaints    Plan repeat labs, probably hold coumadin and have repeat INR in 48 hrs with PMD

## 2023-09-05 NOTE — ED PROVIDER NOTE - PROGRESS NOTE DETAILS
d/w Dr. christina and Dr. west advised jorge k hold for 2 days and f/u in 3 days d/w Dr. christina and Dr. west advised jorge k 2.5mg dose now hold coumadin for 2 days, Dr. christina will repeat INR in 1-2 days and f/u in office in 3 days

## 2023-09-05 NOTE — ED ADULT NURSE NOTE - OBJECTIVE STATEMENT
sent by PMD for elevated INR    denies chest pain or sob, denies any pain at this time, plan of care ongoing

## 2023-09-05 NOTE — ED PROVIDER NOTE - OBJECTIVE STATEMENT
78-year-old female with past medical history of hyperlipidemia atrial fib on Coumadin rheumatic mitral stenosis CVA recent GI bleed coming in for elevated INR outpatient of 8.  Patient denies any bleeding bruising dark stools abdominal pain chest pain.    Cardio Dr. Jade Cespedes

## 2023-09-05 NOTE — ED PROVIDER NOTE - PATIENT PORTAL LINK FT
You can access the FollowMyHealth Patient Portal offered by [facility] by registering at the following website: [email or web address]. By joining [employer]’s FollowMyHealth portal, you will also be able to view your health information using other applications (apps) compatible with our system.

## 2023-09-05 NOTE — ED ADULT NURSE NOTE - NSFALLUNIVINTERV_ED_ALL_ED
Bed/Stretcher in lowest position, wheels locked, appropriate side rails in place/Call bell, personal items and telephone in reach/Instruct patient to call for assistance before getting out of bed/chair/stretcher/Non-slip footwear applied when patient is off stretcher/[employer] to call system/Physically safe environment - no spills, clutter or unnecessary equipment/Purposeful proactive rounding/Room/bathroom lighting operational, light cord in reach

## 2023-09-05 NOTE — ED PROVIDER NOTE - NSFOLLOWUPINSTRUCTIONS_ED_ALL_ED_FT
Follow up with Dr. Hansen Cespedes  DO NOT TAKE COUMADIN today and tomorrow  follow up in 3 days with Dr. Hansen  return to er for any worsening symptoms    Elevated INR    WHAT YOU NEED TO KNOW:    What is an elevated INR? The INR, or International Normalized Ratio, is a measure of how long it takes your blood to clot. A prothrombin time (PT) is a another blood test done to help measure your INR. The higher your PT or INR, the longer your blood takes to clot. An elevated PT or INR means your blood is taking longer to clot than your healthcare provider believes is healthy for you. When your PT or INR is too high, you have an increased risk of bleeding.    What increases my risk for an elevated INR?    Too much anticoagulant medicine, a type of blood thinner that helps prevent clots    Other medicines, such as aspirin, NSAIDs, and some antibiotics, when you also are using anticoagulants    Health conditions, such as liver failure or bleeding disorders    A sudden decrease of vitamin K in your diet  What are the signs and symptoms of an elevated INR? You may have small cuts that bleed more than normal, and for longer than normal. You may bruise easily, have frequent nosebleeds, or notice your gums bleeding.    How is an elevated INR treated? Treatment depends on whether you currently have bleeding and how severe it is. If you take an anticoagulant medicine, your healthcare provider may change your dose, or tell you to skip one or more doses. You may need one of the following treatments:    Vitamin K may be given to decrease your INR and bleeding.    Blood components may be given during a transfusion to help stop your bleeding. Blood components are the parts of blood that help it to clot. Examples are clotting factors, platelets, and plasma.  How can I prevent an elevated INR?    Have your INR measured regularly. Your healthcare provider may want your INR to be measured every few days until it is stable, and then only once a month. You may have blood drawn at your healthcare provider's office. Some people can test their blood at home.    If you take medicine, take it as directed. Contact your healthcare provider before you take other medicines or supplements, because they may elevate your INR.    Eat the same amount of vitamin K daily to keep your INR stable. Vitamin K changes how your blood clots and affects your INR. Vitamin K is found in green leafy vegetables, broccoli, grapes, and other foods. Ask your healthcare provider for more information about what to eat when you have an elevated INR.    Limit alcohol. Alcohol increases your INR. Ask your healthcare provider how much alcohol is safe for you.    Do not smoke. If you smoke, it is never too late to quit. Smoking can affect the way your blood clots. Ask for information if you need help quitting.  How can I decrease my risk of bleeding?    Avoid activities that may cause bleeding or bruising.    Brush and shave gently. Use a soft toothbrush and an electric razor to avoid bleeding.    Tell your dentist and other healthcare providers if you take anticoagulant medicine or have a bleeding disorder. Wear medical alert jewelry, or carry a card that gives this information. Ask where you can get these items.  When should I contact my healthcare provider?    Your menstrual period is heavier than normal.    You see blood in your urine.    Your bowel movement is bloody or black.    You bruise or bleed more than normal, your gums bleed, or you have frequent nosebleeds.    You have pain or swelling in your joints.    Your fingers or toes turn dark purple.    You have more headaches than normal, or your headaches are different than before.    You have questions or concerns about your care.  When should I seek immediate care or call 911?    You throw up blood, or your vomit looks like coffee grounds.    You have any kind of bleeding that does not stop in 15 minutes.    Your leg feels warm, tender, and painful. It may look swollen and red.    You have any of the following signs of a stroke:  Numbness or drooping on one side of your face    Weakness in an arm or leg    Confusion or difficulty speaking    Dizziness, a severe headache, or vision loss  CARE AGREEMENT:    You have the right to help plan your care. Learn about your health condition and how it may be treated. Discuss treatment options with your healthcare providers to decide what care you want to receive. You always have the right to refuse treatment.

## 2023-11-27 ENCOUNTER — OUTPATIENT (OUTPATIENT)
Dept: OUTPATIENT SERVICES | Facility: HOSPITAL | Age: 78
LOS: 1 days | End: 2023-11-27
Payer: COMMERCIAL

## 2023-11-27 ENCOUNTER — APPOINTMENT (OUTPATIENT)
Dept: RADIOLOGY | Facility: CLINIC | Age: 78
End: 2023-11-27
Payer: MEDICARE

## 2023-11-27 DIAGNOSIS — Z95.0 PRESENCE OF CARDIAC PACEMAKER: Chronic | ICD-10-CM

## 2023-11-27 DIAGNOSIS — Z00.8 ENCOUNTER FOR OTHER GENERAL EXAMINATION: ICD-10-CM

## 2023-11-27 PROCEDURE — 77080 DXA BONE DENSITY AXIAL: CPT | Mod: 26

## 2023-11-27 PROCEDURE — 77080 DXA BONE DENSITY AXIAL: CPT

## 2023-12-11 ENCOUNTER — TRANSCRIPTION ENCOUNTER (OUTPATIENT)
Age: 78
End: 2023-12-11

## 2024-02-28 NOTE — PATIENT PROFILE ADULT - PATIENT/CAREGIVER ACCEPTED INTERPRETER SERVICES
Patient : Zulma Mendoza Age: 39 year old Sex: female   MRN: [id number] Encounter Date: 2/28/2024    History     Chief Complaint   Patient presents with    Chest Pain    Shortness of Breath    Headache New Onset on New Symptom         Chest Pain  Primary symptoms include a fever, shortness of breath and cough.       Shortness of Breath   Associated symptoms include chest pain (with coughing), a fever, cough and shortness of breath.   Headache New Onset on New Symptom  The primary symptoms include fever.       Zulma Mendoza is a 39 year old presenting to the emergency department for evaluation of cough and chest pain.  Patient reports she has been experiencing, the last 5 days.  She has a sense of chest pain coughing and deep breaths.  She is also short of breath.  She has had a fever to has they were.  She has a history of PE but is compliant with her Eliquis and symptoms to be similar to when she was 1st diagnosed with PE.  She is nonsmoker and has a history of CAD.       Past/Family/Social History     No Known Allergies    Current Facility-Administered Medications   Medication    sodium chloride 0.9 % flush bag 25 mL    sodium chloride 0.9 % injection 2 mL     Current Outpatient Medications   Medication Sig    methylPREDNISolone (MEDROL DOSEPAK) 4 MG tablet follow package directions    traZODone (DESYREL) 50 MG tablet Take 1 to 2 at bedtime prn insomnia    clonazePAM (KlonoPIN) 1 MG tablet TAKE 1 TABLET BY MOUTH TWICE DAILY AS NEEDED FOR ANXIETY. MUST LAST 30 DAYS.    busPIRone (BUSPAR) 10 MG tablet Take 1 tablet by mouth in the morning and 1 tablet at noon and 1 tablet in the evening.    fluoxetine (PROzac) 40 MG capsule Take 1 capsule by mouth daily.    apixaBAN (ELIQUIS) 5 MG Tab Take 1 tablet by mouth every 12 hours.    HYDROcodone-acetaminophen (NORCO) 5-325 MG per tablet Take 1 tablet by mouth daily as needed for Pain.    pantoprazole (PROTONIX) 40 MG tablet Take 1 tablet by mouth daily.    copper (PARAGARD)  intrauterine device 1 each by Intrauterine route.       Past Medical History:   Diagnosis Date    Alcohol consumption binge drinking 2015    Anxiety     Class 3 severe obesity in adult (CMD) 2021    Depressive disorder     Pneumothorax 2003    Pulmonary embolism and infarction (CMD) 2018    Self-inflicted injury 2015       Past Surgical History:   Procedure Laterality Date    Colonoscopy w/ polypectomy  2019    tubular adenoma rpt age 49    Egd  2019    rpt prn    Removal of tonsils,<10 y/o      7 yrs old    Tendon release  years ago    Right foot       Family History   Problem Relation Age of Onset    Cancer Father         skin    Psychiatric Father         depression and suicide attempt    Depression Father     Psychiatric Mother         depression     Depression Mother     Hypertension Mother     Depression Brother     Hyperlipidemia Maternal Grandfather     Diabetes Maternal Grandfather         diet controlled    Heart disease Maternal Grandfather     Heart disease Paternal Grandmother     Blood Disorder Paternal Grandfather        Social History     Tobacco Use    Smoking status: Former     Current packs/day: 0.00     Average packs/day: 0.5 packs/day for 24.1 years (12.0 ttl pk-yrs)     Types: Cigarettes     Start date:      Quit date: 2024     Years since quittin.0    Smokeless tobacco: Never    Tobacco comments:     Wants chantix   Vaping Use    Vaping Use: never used   Substance Use Topics    Alcohol use: Not Currently    Drug use: Not Currently          Review of Systems   Review of Symptoms     Review of Systems   Constitutional:  Positive for fever.   Respiratory:  Positive for cough and shortness of breath.    Cardiovascular:  Positive for chest pain (with coughing).          Physical Exam   Physical Exam     ED Triage Vitals [24 1654]   ED Triage Vitals Group      Temp 97.4 °F (36.3 °C)      Heart Rate 79      Resp 18      /59      SpO2 99 %       EtCO2 mmHg       Height 5' 5\" (1.651 m)      Weight 239 lb 6.7 oz (108.6 kg)      Weight Scale Used Scale in bed      BMI (Calculated) 39.84      IBW/kg (Calculated) 57       Physical Exam  Vitals and nursing note reviewed.   Constitutional:       General: She is not in acute distress.     Appearance: Normal appearance. She is not ill-appearing or diaphoretic.   HENT:      Head: Normocephalic and atraumatic.      Mouth/Throat:      Mouth: Mucous membranes are moist.   Cardiovascular:      Rate and Rhythm: Normal rate and regular rhythm.   Pulmonary:      Effort: Pulmonary effort is normal. No respiratory distress.      Breath sounds: Normal breath sounds. No decreased breath sounds, wheezing, rhonchi or rales.   Abdominal:      General: Abdomen is flat. There is no distension.      Tenderness: There is no abdominal tenderness.   Musculoskeletal:      Cervical back: Normal range of motion and neck supple.      Right lower leg: No edema.      Left lower leg: No edema.   Skin:     General: Skin is warm and dry.   Neurological:      Mental Status: She is alert.            Procedures   ED Procedures     Procedures     Lab Results   ED Lab   Results for orders placed or performed during the hospital encounter of 02/28/24   COVID/Flu/RSV panel   Result Value Ref Range    Rapid SARS-COV-2 by PCR Detected (A) Not Detected / Detected / Presumptive Positive / Inhibitors present    Influenza A by PCR Not Detected Not Detected    Influenza B by PCR Not Detected Not Detected    RSV BY PCR Not Detected Not Detected    Procedural Comment      SARS-CoV-2 N2 Ct Value 21.3    Comprehensive Metabolic Panel   Result Value Ref Range    Fasting Status      Sodium 134 (L) 135 - 145 mmol/L    Potassium 5.0 3.4 - 5.1 mmol/L    Chloride 99 97 - 110 mmol/L    Carbon Dioxide 26 21 - 32 mmol/L    Anion Gap 14 7 - 19 mmol/L    Glucose 83 70 - 99 mg/dL    BUN 6 6 - 20 mg/dL    Creatinine 0.67 0.51 - 0.95 mg/dL    Glomerular Filtration Rate >90  >=60    BUN/Cr 9 7 - 25    Calcium 8.1 (L) 8.4 - 10.2 mg/dL    Bilirubin, Total 0.2 0.2 - 1.0 mg/dL    GOT/AST 28 <=37 Units/L    GPT/ALT 18 <64 Units/L    Alkaline Phosphatase 47 45 - 117 Units/L    Albumin 2.9 (L) 3.6 - 5.1 g/dL    Protein, Total 6.5 6.4 - 8.2 g/dL    Globulin 3.6 2.0 - 4.0 g/dL    A/G Ratio 0.8 (L) 1.0 - 2.4   TROPONIN I, HIGH SENSITIVITY   Result Value Ref Range    Troponin I, High Sensitivity 11 <52 ng/L   NT proBNP   Result Value Ref Range    NT-proBNP 42 <=125 pg/mL   CBC with Automated Differential (performable only)   Result Value Ref Range    WBC 6.8 4.2 - 11.0 K/mcL    RBC 4.33 4.00 - 5.20 mil/mcL    HGB 13.6 12.0 - 15.5 g/dL    HCT 40.6 36.0 - 46.5 %    MCV 93.8 78.0 - 100.0 fl    MCH 31.4 26.0 - 34.0 pg    MCHC 33.5 32.0 - 36.5 g/dL    RDW-CV 12.2 11.0 - 15.0 %    RDW-SD 42.5 39.0 - 50.0 fL     140 - 450 K/mcL    NRBC 0 <=0 /100 WBC    Neutrophil, Percent 63 %    Lymphocytes, Percent 28 %    Mono, Percent 7 %    Eosinophils, Percent 2 %    Basophils, Percent 0 %    Immature Granulocytes 0 %    Absolute Neutrophils 4.2 1.8 - 7.7 K/mcL    Absolute Lymphocytes 1.9 1.0 - 4.8 K/mcL    Absolute Monocytes 0.5 0.3 - 0.9 K/mcL    Absolute Eosinophils  0.1 0.0 - 0.5 K/mcL    Absolute Basophils 0.0 0.0 - 0.3 K/mcL    Absolute Immature Granulocytes 0.0 0.0 - 0.2 K/mcL   D Dimer, Quantitative   Result Value Ref Range    D Dimer, Quantitative <0.19 <0.57 mg/L (FEU)          EKG     EKG Interpretation  Rate: 76  Rhythm: normal sinus rhythm   Abnormality: no    Per my review of the EKG tracing, my findings are listed above, awaiting confirmation from cardiology    Radiology Results   ED Radiology Results     Imaging Results              XR CHEST PA OR AP 1 VIEW (Final result)  Result time 02/28/24 17:47:18      Final result                   Impression:    IMPRESSION:    1. There is no evidence for pneumonia, pneumothorax, pulmonary edema or  pleural effusion.       2. There is no enlargement of  the cardiac/pericardial silhouette.    3. No evidence for mass effect on the trachea.        Electronically Signed by: Jesus Lance MD  Signed on: 2/28/2024 5:47 PM  Created on Workstation ID: [id number]  Signed on Workstation ID: [id number]               Narrative:    EXAMINATION:  XR CHEST AP OR PA    TECHNIQUE: XR CHEST AP OR PA     CLINICAL HISTORY:  39 years old Female presents with  Chest Pain     COMPARISON: 2 months prior    FINDINGS and                                          ED Medications   ED Medications     ED Medication Orders (From admission, onward)      Ordered Start     Status Ordering Provider    02/28/24 1913 02/28/24 1915  morphine injection 4 mg  ONCE         Last MAR action: Given HALIE, NADRIA    02/28/24 1830 02/28/24 1845  butalbital-APAP-caffeine (FIORICET) -40 MG tablet 1 tablet  ONCE         Last MAR action: Given HALIE, NADRIA    02/28/24 1722 02/28/24 1730  morphine injection 4 mg  ONCE         Last MAR action: Given HALIE, NADRIA    02/28/24 1722 02/28/24 1730  ondansetron (ZOFRAN) injection 4 mg  ONCE         Last MAR action: Given HALIE, NADRIA    02/28/24 1706 02/28/24 1715  aspirin chewable 324 mg  ONCE         Last MAR action: Given HALIE, NADRIA                 ED Course     Vitals:    02/28/24 1845 02/28/24 1900 02/28/24 1915 02/28/24 1930   BP: (!) 193/126 (!) 158/95 (!) 181/103 (!) 158/89   BP Location:       Patient Position:       Pulse: 71 78 66 69   Resp: (!) 12 20 20 20   Temp:       TempSrc:       SpO2: 94% 97% 97% 95%   Weight:       Height:       LMP: 02/14/2024            HEART Score for Major Cardiac Events:   History: Slightly suspicious   EKG Normal   AGE Less than or equal to 45   RISK FACTOR 1-2 risk factors   TROPONIN: Equal or less than normal limit   TOTAL SCORE 1     Consults                None    Medical Decision Making  Ddx includes but not limited to: viral illness, Angina, MI, Pericarditis/myocarditis, arrhythmia, palpitations, PE, PNA, PTX, ANIYA, Pleurisy,   pulmonary embolism, pneumonia, pleurisy, esophageal rupture or spasm, GERD, hiatal hernia, gastritis/PUD, pancreatitis, costochondritis  -EKG without acute ischemic changes.  Trop negative.  -CXR is clear  -Pt has a h/o PE but is on eliquis and reports compliance. D dimer is negative.  -COVID test is positive but sxs have been for 5 days. Will not prescribe paxlovid.  -Feels improved with treatment here    Stable for discharge home                                     MDM done in ED Course    Does the Patient have sepsis: NO     Critical Care       No Critical Care        Disposition       Clinical Impression and Diagnosis  7:55 PM       ED Diagnoses       Diagnosis Comment Associated Orders       Final diagnoses    Acute cough -- --    Pleuritic chest pain -- SERVICE TO FAMILY PRACTICE      Shortness of breath -- SERVICE TO FAMILY PRACTICE      COVID-19 virus detected -- SERVICE TO FAMILY PRACTICE              Follow Up:  [facility]  [address] [place] [address]  [phone]    If symptoms worsen          Summary of your Discharge Medications        Take these Medications        Details   methylPREDNISolone 4 MG tablet  Commonly known as: MEDROL DOSEPAK   follow package directions              Pt is discharged to home/self care in stable condition.          Discharge 2/28/2024  8:12 PM  Zulma Mendoza discharge to home/self care.                       Kisha Ryan MD  02/28/24 2017     yes

## 2024-07-19 NOTE — DISCHARGE NOTE PROVIDER - NSCORESITESY/N_GEN_A_CORE_RD
CM met with pt and spouse for final discharge planning assessment.   Pt is alert and oriented at time of assessment.  Pt confirms all demographic information correct in Epic.   Pt lives in [facility] and will establish her own new PCP in [place].   Pt is independent with ADL's uses no DME and anticipates   CM needs for discharge.   See new PCP and GI as discussed       01/28/20 1140   Final Note   Assessment Type Final Discharge Note   Anticipated Discharge Disposition Home   Hospital Follow Up  Appt(s) scheduled? Yes   Discharge plans and expectations educations in teach back method with documentation complete? Yes   Right Care Referral Info   Post Acute Recommendation No Care      CM met with pt and spouse for initial discharge planning assessment. Pt is alert and oriented at time of assessment.    Pt confirms all demographic information correct in Epic. Pt lives in [facility] and will establish her own PCP in [place]. She states she knows who she will see.     Pt is independent with ADL's uses no DME and anticipates no CM needs for discharge.    Pt and spouse are actually in town for a vacation. Pt to have an endoscopy procedure tomorrow and will discharge after if all clear.     01/26/20 8108   Discharge Assessment   Assessment Type Discharge Planning Assessment   Confirmed/corrected address and phone number on facesheet? Yes   Assessment information obtained from? Caregiver;Medical Record;Patient   Communicated expected length of stay with patient/caregiver yes   Prior to hospitilization cognitive status: Alert/Oriented   Prior to hospitalization functional status: Independent   Current cognitive status: Alert/Oriented   Current Functional Status: Independent   Lives With spouse   Able to Return to Prior Arrangements yes   Patient's perception of discharge disposition home or selfcare   Readmission Within the Last 30 Days no previous admission in last 30 days   Patient currently being followed by outpatient case management? No   Patient currently receives any other outside agency services? No   Equipment Currently Used at Home none   Do you have any problems affording any of your prescribed medications? No   Is the patient taking medications as prescribed? yes   Does the patient have transportation home? Yes   Transportation Anticipated family or friend will provide;car, drives self   Discharge Plan A Home   Discharge Plan B Home   DME Needed Upon Discharge  none   Patient/Family in Agreement with Plan yes      No O2 in use.    Plan of care reviewed with pt. Pt AAOx4, NAD. Pt reports intermittent abd pain that is managed well with Dilaudid IV. No SOB, n/v, dizziness or lightheadedness. VS stable. Pt remains afebrile. IV abx therapy in progress. No adverse reaction noted. Pt tolerating bowel prep for colonoscopy in the a.m. Pt has had 2 episodes of bowel movement. Blood noted in toilet. IV fluids infusing. Pt ambulates in room without difficulty. Safety measures implemented. Bed is lowered and locked. Call light is within reach. Will continue to monitor pt.    Awake/Alert No

## 2024-07-23 NOTE — ED ADULT TRIAGE NOTE - SOURCE OF INFORMATION
Clinic Care Coordination Contact  [facility]/Voicemail    Clinical Data: Care Coordinator Outreach    Outreach Documentation Number of Outreach Attempt   7/22/2024  11:23 AM 1   7/23/2024   9:12 AM 2       Left message on patient's voicemail with call back information and requested return call.    Plan: Care Coordinator sent care coordination introduction letter on 7/23/24 via [id number]. Care Coordinator will do no further outreaches at this time.    DEVAUGHN Sims   Care Coordination Team  [phone]       Son

## 2024-12-07 NOTE — ED PROVIDER NOTE - NS_BEDUNITTYPES_ED_ALL_ED
Decrease chlorthalidone to one-half tab daily  Change to decaffeinated coffee  Tamsulosin - decrease to one capsule twice a day  Expect call from [facility] clinical pharmacist about diabetic medications  [facility] therapy will call to schedule rehab  
ICU

## 2025-01-07 NOTE — ED ADULT TRIAGE NOTE - IDEAL BODY WEIGHT(KG)
[facility]  Advanced Heart Failure, M[facility], Transplant and Pulmonary Hypertension Cardiology  LVAD Progress Note      Date: 2025    Patient: Evelyn Mi   : 1973 MRN: [id number]   Admit Date: 2024 LOS: 9   PCP: Tierra Salcedo CNP  Referring Provider: No ref. provider found     SUBJECTIVE: Pt was seen and examined awake in bed this AM. Chest pain has improved. Feels euvolemic. Denies dizziness. Pt states she does not feel like she is retaining fluid. + vaginal bleeding. Pt states she needs to go home to take care of her cat.     HPI: Ms. Mi is a 51 year old female with PMHx of chronic NICM HFrEF s/p HM3 on 3/26/21 as DT, chronic driveline infection, HIV (recently undetectable), asthma, CVA without residual deficit, prior DVT who presents to the ED with worsening HF symptoms. She had bloating, SOB, DC, productive cough, edema, and fatigue. She reported a few days of decreased urine output despite being on a higher dose of lasix. She was also busy during the holidays with family and could have had some dietary noncompliance. She denies VAD alarms.     REVIEW OF SYSTEMS:  Constitutional: Denies fever, chills, fatigue, myalgias, and weakness.   HEENT: Denies headache, vision changes. Denies nose bleeding and gingival bleeding.   Respiratory: Denies SOB. Denies orthopnea, PND.  Cardiovascular: +chest pain under left breast, improved today,  Denies palpitations, LE edema.  Denies recent VAD alarms or ICD shocks.   GI:  Denies abdominal bloating, early satiety. Denies nausea, vomiting, diarrhea, constipation, abdominal pain. Denies bloody or black/tarry stools. Last BM: yesterday.  :  Denies dark colored urine, blood in urine. Denies urinary retention, pain with urination, urinary frequency, and nocturia.   Musculoskeletal:  +chest/breast pain-improved. Denies back pain, neck pain, joint pain.  Integument:  Denies redness, pain and drainage at driveline exit site.    Neurologic:  Denies focal weakness or sensory changes.  Denies numbness and tingling.  Endocrine:  Denies polyuria, polydipsia or temperature intolerance.     Past Medical History:   Diagnosis Date    Anemia     Arthritis     Asthma (CMD)     Blood clot associated with vein wall inflammation     Bronchitis     Cardiac pacemaker     Cerebral embolism and thrombosis     Chest pain     CHF (congestive heart failure)  (CMD)     COPD (chronic obstructive pulmonary disease)  (CMD)     CVA (cerebral vascular accident)  (CMD) 2019    Deep vein thrombosis  (CMD)     History of blood transfusion     HIV (human immunodeficiency virus infection) (CMD)     Hypertension     Intestinal infection due to Clostridium difficile     LVAD (left ventricular assist device) present  (CMD)     Lymphoma (CMD)     Non-ischemic cardiomyopathy  (CMD)     Noncompliance with CPAP treatment     NSTEMI (non-ST elevated myocardial infarction)  (CMD)     LEONEL (obstructive sleep apnea)     PE (pulmonary thromboembolism)  (CMD)     Pneumonia     UTI (urinary tract infection)       Past Surgical History:   Procedure Laterality Date    Cardiac surgery      Coronary angiogram - cv      Gallbladder surgery      Ir intracranial artery mechanical thrombectomy and/or thrombolysis      Left ventricular assist device Left 03/26/2021     3    Removal gallbladder      Right heart cath        Family History   Problem Relation Age of Onset    Congestive Heart Failure Mother     Hypertension Mother     Congestive Heart Failure Father     Hypertension Father     Seizure Disorder Brother       Social History     Socioeconomic History    Marital status: Single     Spouse name: Not on file    Number of children: Not on file    Years of education: Not on file    Highest education level: Not on file   Occupational History    Not on file   Tobacco Use    Smoking status: Former     Current packs/day: 0.00     Average packs/day: 0.3 packs/day for 10.0 years (2.5 ttl  pk-yrs)     Types: Cigarettes     Start date: 1/1/2000     Quit date: 1/1/2010     Years since quitting: 15.0    Smokeless tobacco: Never   Vaping Use    Vaping status: never used   Substance and Sexual Activity    Alcohol use: Never    Drug use: Not Currently     Types: Marijuana    Sexual activity: Not on file   Other Topics Concern    Not on file   Social History Narrative    Not on file     Social Determinants of Health     Financial Resource Strain: Low Risk  (12/29/2024)    Financial Resource Strain     Unable to Get: None   Food Insecurity: Low Risk  (12/29/2024)    Food Insecurity     Worried about Food: Never true     Food is Gone: Never true   Transportation Needs: Not At Risk (12/29/2024)    Transportation Needs     Lack of Reliable Transportation: No   Physical Activity: Low Risk  (7/3/2024)    Exercise Vital Sign     Days of Exercise per Week: 7 days     Minutes of Exercise per Session: 30 min   Stress: Low Risk  (7/3/2024)    Stress     How Stressed: Not at all   Social Connections: Low Risk  (12/29/2024)    Social Connections     Social Connectivity: 3 to 5 times a week   Interpersonal Safety: Low Risk  (12/29/2024)    Interpersonal Safety     How often physically hurt: Never     How often insulted or talked down to: Never     How often threatened with harm: Never     How often scream or curse at: Never      OBJECTIVE:    Vitals with min/max    Vital Last Value 24 Hour Range   Temperature 97.5 °F (36.4 °C) (01/07/25 0800) Temp  Min: 97.5 °F (36.4 °C)  Max: 97.8 °F (36.6 °C)   Pulse 80 (01/07/25 0800) Pulse  Min: 77  Max: 83   Respiratory 16 (01/07/25 1003) Resp  Min: 16  Max: 18   Non-Invasive  Blood Pressure 117/79 (01/02/25 1115) No data recorded   Pulse Oximetry 97 % (01/07/25 0800) SpO2  Min: 96 %  Max: 100 %   Arterial   Blood Pressure   No data recorded        Weight    01/03/25 0500 01/04/25 0500 01/06/25 0500 01/07/25 0400   Weight: 88.9 kg (195 lb 15.8 oz) 88.6 kg (195 lb 5.2 oz) 85.1 kg (187  lb 9.8 oz) 85 kg (187 lb 6.3 oz)        Intake/Output Summary (Last 24 hours) at 2025 1125  Last data filed at 2025 0700  Gross per 24 hour   Intake 400 ml   Output 890 ml   Net -490 ml     PHYSICAL EXAMINATION:  Constitutional:  Alert and Oriented. Pt in no acute distress.  Cardiovascular: Audible VAD Tones and intermittent-palpable pulse. No JVD, No HJR  Respiratory:  No respiratory distress. Diminished at bases.  GI: Abdomen soft, non-tender and non-distended.   Integumentary:  Warm. Dry. No rash. Driveline site CDI. Randall intact.   Extremities: BLE w/0 to trace edema. All extremities warm and well perfused.  Neurologic:  Alert & oriented x 3. Normal motor function. Normal sensory function. No focal deficits noted.   Psychiatric: Cooperative, appropriate mood and affect.    VAD INTERROGATION  Device Type: HeartMate3  Implant Date: 3/26/21  Flow: 3.8 L/min Pump Speed: 5200 PI: 7.3 Power: 3.8 W   Low Speed Settin HCT: 20%   Back-up battery used 5 times for 0 minutes  Change Back-up Battery in 27 months  Abnormal Trends: Asymptomatic with frequent PI events  Alarms: Low Voltage Advisory  Changes Made: None   Log Files Sent: No    MEDICATIONS:    ALLERGIES:   Allergen Reactions    Breo Ellipta SWELLING    Azithromycin PRURITUS     Per patient in 2018    Bactrim [Sulfamethoxazole-Trimethoprim] GI UPSET and Nausea & Vomiting    Enalapril Angioedema, Nausea & Vomiting and Other (See Comments)     Headache      Isosorbide HEADACHES    Nitroglycerin RASH     Nitro Patch only    Valium [Di Nagy] PRURITUS        Current Facility-Administered Medications   Medication Dose Route Frequency Provider Last Rate Last Admin    warfarin (COUMADIN) tablet 4 mg  4 mg Oral Once Vincent Cardenas DO        predniSONE (DELTASONE) tablet 50 mg  50 mg Oral Daily with breakfast Sofia Gan CNP   50 mg at 25 0856    simethicone (MYLICON) tablet 125 mg  125 mg Oral Q6H Sofia Gan, CNP   125  mg at 01/07/25 0856    cephalexin (KEFLEX) capsule 500 mg  500 mg Oral 3 times per day Flaquito Arreguin MD   500 mg at 01/07/25 0539    polyethylene glycol (MIRALAX) packet 17 g  17 g Oral BID Nicolás Castillo MD   17 g at 01/07/25 0855    docusate sodium-sennosides (SENOKOT S) 50-8.6 MG 2 tablet  2 tablet Oral BID Nicolás Castillo MD   2 tablet at 01/06/25 2003    sacubitril-valsartan (ENTRESTO) 24-26 MG per tablet 1 tablet  1 tablet Oral BID Elena Martines APNP        sodium chloride 0.9 % injection 2 mL  2 mL Intracatheter 2 times per day Vincent Cardenas DO   2 mL at 01/07/25 0859    [Held by provider] spironolactone (ALDACTONE) tablet 25 mg  25 mg Oral Daily Darya Walker APNP   25 mg at 01/01/25 0828    ipratropium-albuterol (DUONEB) 0.5-2.5 (3) MG/3ML nebulizer solution 3 mL  3 mL Nebulization TID Resp Octavio Rubalcava MD   3 mL at 01/07/25 0742    sulfamethoxazole-trimethoprim (BACTRIM DS) 800-160 MG tablet 1 tablet  1 tablet Oral Once per day on Monday Wednesday Friday Flaquito Arreguin MD   1 tablet at 01/01/25 0828    aspirin chewable 81 mg  81 mg Oral Daily Vincent Cardenas DO   81 mg at 01/07/25 0856    [Held by provider] empagliflozin (JARDIANCE) tablet 10 mg  10 mg Oral QAM AC Vincent Cardenas DO   10 mg at 01/03/25 0521    digoxin (LANOXIN) tablet 125 mcg  125 mcg Oral Every Other Day Vincent Cardenas DO   125 mcg at 01/07/25 0856    WARFARIN - PHARMACIST MONITORED Misc   Does not apply See Admin Instructions Vincent Cardenas DO        allopurinol (ZYLOPRIM) tablet 100 mg  100 mg Oral Daily Octavio Rubalcava MD   100 mg at 01/07/25 0857    bictegravir-emtricitab-tenofov (BIKTARVY) -25 MG per tablet 1 tablet  1 tablet Oral Daily Octavio Rubalcava MD   1 tablet at 01/07/25 0856    darunavir-cobicistat (PREZCOBIX) 800-150 MG per tablet 1 tablet  1 tablet Oral Nightly Octavio Rubalcava MD   1 tablet at 01/06/25 2003    pantoprazole (PROTONIX) EC  tablet 40 mg  40 mg Oral Nightly Octavio Rubalcava MD   40 mg at 01/06/25 2003        Current Facility-Administered Medications   Medication Dose Route Frequency Provider Last Rate Last Admin    HYDROcodone-acetaminophen (NORCO) 5-325 MG per tablet 1 tablet  1 tablet Oral Q4H PRN Sofia Gan, CNP   1 tablet at 01/07/25 0903    simethicone (MYLICON) tablet 125 mg  125 mg Oral Q6H PRN Sofia Gan, JENISE        cyclobenzaprine (FLEXERIL) tablet 5 mg  5 mg Oral Q8H PRN Sofia Gan, CNP        bisacodyl (DULCOLAX) suppository 10 mg  10 mg Rectal Daily PRN Nicolás Castillo MD        sodium chloride 0.9 % injection 10 mL  10 mL Intravenous PRN Vincent Cardenas DO        ipratropium-albuterol (DUONEB) 0.5-2.5 (3) MG/3ML nebulizer solution 3 mL  3 mL Nebulization Q6H Resp PRN Octavio Rubalcava MD   3 mL at 01/01/25 2342    ondansetron (ZOFRAN) injection 4 mg  4 mg Intravenous Q6H PRN Nicolás Castillo MD   4 mg at 01/02/25 1433    sodium chloride 0.9 % injection 10 mL  10 mL Intravenous PRN Vincent Cardenas DO        albuterol inhaler 2 puff  2 puff Inhalation Q6H PRN Octavio Rubalcava MD        acetaminophen (TYLENOL) tablet 650 mg  650 mg Oral TID PRN Octavio Rubalcava MD        guaiFENesin 100 MG/5ML solution 200 mg  200 mg Oral Q4H PRN Octavio Rubalcava MD   200 mg at 12/30/24 0143      LABS, IMAGING, CARDIAC TESTING:  LAST 3 LABS:  CMP and Magnesium:  Recent Labs   Lab 01/07/25  0640 01/06/25  0536 01/05/25  0505   Glucose 145* 96 112*   Sodium 130* 131* 131*   Potassium 4.9 4.6 5.1   Chloride 100 100 100   Carbon Dioxide 27 29 29   Anion Gap 8 7 7   BUN 25* 25* 29*   Creatinine 0.95 0.96* 1.25*   Calcium 8.8 8.9 8.7   Bilirubin, Total 0.8 0.7 0.6   GOT/AST 22 25 22   GPT 22 19 17   Alkaline Phosphatase 79 83 91   Protein, Total 7.3 7.4 6.9   Albumin 2.7* 2.9* 2.7*   Globulin 4.6* 4.5* 4.2*   A/G Ratio 0.6* 0.6* 0.6*   Magnesium 2.4 2.7* 3.3*     CBC:  Recent Labs   Lab 01/07/25  0640 01/06/25  0536  01/05/25  0505   WBC 3.1* 3.7* 3.5*   RBC 5.04 5.03 4.97   HGB 14.4 14.6 14.3   HCT 45.5 45.9 45.0   MCV 90.3 91.3 90.5   MCH 28.6 29.0 28.8   MCHC 31.6* 31.8* 31.8*   RDW-CV 14.3 14.4 14.3   RDW-SD 47.5 48.4 47.8    162 121*   NRBC 0 0 0   Neutrophil, Percent 74 57 62   Lymphocytes, Percent 21 30 26   Mono, Percent 5 13 12   Eosinophils, Percent 0 0 0   Basophils, Percent 0 0 0   Immature Granulocytes 0 0 0   Absolute Neutrophils 2.3 2.1 2.2   Absolute Lymphocytes 0.7* 1.1 0.9*   Absolute Monocytes 0.1* 0.5 0.4   Absolute Eosinophils  0.0 0.0 0.0   Absolute Basophils 0.0 0.0 0.0   Absolute Immature Granulocytes 0.0 0.0 0.0       INR:  Recent Labs   Lab 01/07/25  0640 01/06/25  0536 01/05/25  0505 09/20/24  0518 09/19/24  0555   INR 1.1 1.3 1.8   < > 1.4   Protime- PT 12.5* 14.5* 18.5*   < > 15.4*   PTT  --   --   --   --  30    < > = values in this interval not displayed.       LDH:  Recent Labs   Lab 01/07/25  0640 01/06/25  0536 01/05/25  0505   LD, Total 210 245* 224       NT-proBNP:  No results for input(s): \"NTPROBNP\" in the last 72 hours.    Cultures:  Drive Line Culture & Bacterial, Blood Cultures:    Recent Labs   Lab 12/30/24  1112 12/30/24  1050 11/26/24  1716 09/21/24  0915 09/19/24  1748 09/19/24  0602   AANC  --   --   --   --  Rare Staphylococcus aureus*  --    Culture, Blood or Bone Marrow No Growth 5 Days. No Growth 5 Days. No Growth 5 Days.   < >  --  Staphylococcus aureus*   Gram Stain  --   --   --   --  Few Polymorphonuclear cells.  Rare Epithelial cells.  Rare Gram positive cocci Gram positive cocci*    < > = values in this interval not displayed.     CT Chest 1/5/25:  IMPRESSION:  Cardiomegaly.  A few faint centrilobular alveolar opacities in the right upper lobe, may  represent atypical infection.  Interval decrease in size of the subcutaneous collection in the epigastric  region.    Pelvic US 1/5/25:  IMPRESSION:  1.   Abnormal thickening of the postmenopausal endometrial lining  measuring  1.1 cm, which can be seen with endometrial hyperplasia or carcinoma.   OB/GYN consultation for potential biopsy should be considered.  2.   Uterine fibroids measuring up to 3.4 cm.  3.   Ovaries were not visualized bilaterally.    Echocardiogram:      Results for orders placed or performed during the hospital encounter of 24   TRANSTHORACIC ECHO (TTE) COMPLETE W/ W/O IMAGING AGENT   Result Value Ref Range    Ejection Fraction 24%     Tricuspid Valve Peak Regurgitation Velocity 3.2     MEMO LVOT Peak Gradient 1.2     LV end diastolic posterior wall thickness 2D 3.5     Left Ventricular Internal Dimension in Diastole 3.0     Left Internal Dimenson in Systole 1.2     Impression    *[facility]*  [address] [address] [phone]  Transthoracic Echocardiogram (TTE)    Patient: Evelyn Mi      Study Date/Time:      Eliot 3 2025 11:33AM  MRN:     [id number]                FIN#:                 [id number]  :     1973             Ht/Wt:                162cm 89kg  Age:     51                     BSA/BMI:              2.04m^2 33.9kg/m^2  Gender:  F                      Baseline BP:          117 / 79  *Ordering Physician:* Elena Martines     *Attending Physician:* Nicolás Castillo     *Diagnostic Physician:* Delvin Osei DO  *Sonographer:Sarah Chang RDCS     ------------------------------------------------------------------------------  INDICATIONS:   Chest pain.  Left ventricular assist device.    ------------------------------------------------------------------------------  STUDY CONCLUSIONS  SUMMARY:    1. Left ventricle: The cavity size is normal. Wall thickness is mildly     increased. Systolic function is severely reduced. The ejection fraction was     measured by single plane method of disks. A left ventricular assist device     (LVAD) is visualized, with an inflow cannula at the apex. The baseline LVAD     speed is 5500 rpm changed to 5100  during exam due to suction event.. The     ejection fraction is 24%.  2. Ventricular septum: There is \"septal bounce\" post speed change.  3. Aortic valve: In correlation with the LVAD, the leaflets do not open.  4. Right ventricle: The cavity size is dilated. Systolic function is reduced.     Pacemaker lead noted in right ventricle.  5. Right atrium: Pacemaker lead noted in right atrium.    ------------------------------------------------------------------------------  STUDY DATA:  Patient room number: 9334w.  Procedure:  A transthoracic  echocardiogram was performed. Image quality was good.  M-mode, complete 2D,  complete spectral Doppler, and color Doppler.  Study status:  Same day.  Study  completion:  There were no complications.    FINDINGS    BASELINE ECG:   Paced rhythm.  LEFT VENTRICLE:  The cavity size is normal. Wall thickness is mildly  increased. Systolic function is severely reduced. There is severe diffuse  hypokinesis. A left ventricular assist device (LVAD) is visualized, with an  inflow cannula at the apex. The baseline LVAD speed is 5500 rpm changed to  5100 during exam due to suction event..    The ejection fraction was measured  by single plane method of disks. The ejection fraction is 24%. The tissue  Doppler parameters are abnormal. Left ventricular diastolic function  parameters are indeterminate at present time.    AORTIC VALVE:  The annulus is normal. Unable to determine morphology. The  leaflets are normal thickness. Velocity is within the normal range. There is  trivial regurgitation.  Doppler:  In correlation with the LVAD, the leaflets  do not open.    AORTA:  Aortic root: The root is normal-sized.  Ascending aorta: The vessel is normal-sized.    MITRAL VALVE:  The annulus is normal. The leaflets are normal thickness.  Inflow velocity is within the normal range. There is no evidence for stenosis.    ATRIAL SEPTUM:  The septum bows from right to left, consistent with increased  right  atrial pressure.    LEFT ATRIUM:  The atrium is normal in size.    RIGHT VENTRICLE:  The cavity size is dilated. Systolic function is reduced.  Pacemaker lead noted in right ventricle.    VENTRICULAR SEPTUM:   There is \"septal bounce\" post speed change.    PULMONIC VALVE:   The annulus is normal-sized. There is no regurgitation.    TRICUSPID VALVE:  The leaflets are normal thickness. There is mild-moderate  regurgitation.    RIGHT ATRIUM:  The atrium is dilated. Pacemaker lead noted in right atrium.    PERICARDIUM:   There is no pericardial effusion.    ------------------------------------------------------------------------------  Measurements     Left ventricle            Value        Ref       11/27/2024  Right ventricle            Value          Ref       11/27/2024   DONNIE, LAX chord        (L) 3.5   cm     3.8 - 5.2 5.7         DONNIE, LAX                   4.1   cm       --------- 3.5   ESD, LAX chord        (N) 3.0   cm     2.2 - 3.5 5.5         DONNIE minor ax, A4C base (H) 5.0   cm       2.5 - 4.1 ----------   DONNIE/bsa, LAX chord    (L) 1.7   cm/m^2 2.3 - 3.1 2.7   ESD/bsa, LAX chord    (N) 1.5   cm/m^2 1.3 - 2.1 2.6         Left atrium                Value          Ref       11/27/2024   PW, ED, LAX           (H) 1.2   cm     0.6 - 0.9 1.0         AP dim, ES             (H) 4.0   cm       2.7 - 3.8 4.2   DONNIE major ax, A4C         7.1   cm     --------- 8.0         AP dim index, ES       (N) 2.0   cm/m^2   1.5 - 2.3 2.0   ESD major ax, A4C         6.6   cm     --------- 7.2         Area ES, A4C           (N) 14    cm^2     <=20      25   FS major axis, A4C        7     %      --------- 10          Area/bsa ES, A4C           6.82  cm^2/m^2 --------- 11.88   DONNIE/bsa major ax, A4C     3.5   cm/m^2 --------- 3.8         Vol, S                 (N) 34    ml       22 - 52   78   ESD/bsa major ax, A4C     3.2   cm/m^2 --------- 3.4         Vol/bsa, S             (N) 17    ml/m^2   16 - 34   37   SOFY, A4C                   22.2  cm^2   --------- 37.2        Vol, ES, 1-p A4C       (N) 34    ml       22 - 52   78   ROEL, A4C                  18.4  cm^2   --------- 33.3        Vol/bsa, ES, 1-p A4C   (N) 17    ml/m^2   11 - 40   37   FAC, A4C                  17    %      --------- 10   IVS, ED               (H) 1.2   cm     0.6 - 0.9 1.0         Right atrium               Value          Ref       11/27/2024   PW, ED                (H) 1.0   cm     0.6 - 0.9 1.0         SI dim, ES, A4C        (H) 6.0   cm       3.4 - 5.3 ----------   IVS/PW, ED                1.01         --------- 1.06        SI dim/bsa, ES, A4C    (N) 2.9   cm/m^2   1.9 - 3.1 ----------   EDV                   (L) 41    ml     46 - 106  181         Area, ES, A4C          (H) 28    cm^2     10 - 18   ----------   ESV                   (N) 25    ml     14 - 42   168         Vol, ES, 1-p A4C           98    ml       --------- ----------   EF                    (L) 24    %      54 - 74   14          Vol/bsa, ES, 1-p A4C   (H) 48    ml/m^2   9 - 33    ----------   SV                        16    ml     --------- 8   EDV/bsa               (L) 20    ml/m^2 29 - 61   85          Tricuspid valve            Value          Ref       11/27/2024   ESV/bsa               (N) 12    ml/m^2 8 - 24    79          TR peak v              (N) 2.3   m/sec    <=2.8     ----------   SV/bsa                    8     ml/m^2 --------- 4           Peak RV-RA grad, S         21    mm Hg    --------- ----------   SV, 1-p A4C               14    ml     --------- 20   SV/bsa, 1-p A4C           7     ml/m^2 --------- 9           Ascending aorta            Value          Ref       11/27/2024   ESV, 2-p              (H) 43    ml     14 - 42   124         AAo AP diam, ED        (N) 3.2   cm       1.9 - 3.5 ----------   ESV/bsa, 2-p          (N) 21    ml/m^2 8 - 24    58          AAo AP diam/bsa, ED    (N) 1.6   cm/m^2   1.0 - 2.2 ----------     Legend:  (L)  and  (H)  tavia values outside specified reference  range.    (N)  marks values inside specified reference range.    Prepared and electronically signed by  Delvin Osei DO  01/03/2025 16:35     Right Heart Catheterization:  Results for orders placed or performed during the hospital encounter of 12/29/24   Cath/PV Case    Narrative    Impressions:  Reduced cardiac output with HeartMate 3 LVAD at 5300 rpm  Mildly elevated RA, RV, PAWP pressures  Mildly elevated PA pressures  Mildly elevated PVR  Elevated SVR    Plan:  Soper pulled  Increase LVAD speed to 5500 rpm  Transfer back to   Discussed with rounding Heart Failure team       IMPRESSION AND PLAN:    Acute on Chronic severe biventricular heart failure s/p Heartmate III, Left Ventricular Assist Device (LVAD) as Destination Therapy (DT) on 3/26/2021 due to BMI and PHTN  - Etiology: NICM  - LV Core Biopsy: Benign myocardium with focal interstitial fibrosis consistent with cardiomyopathy. There is no evidence of active myocarditis, vasculitis or granuloma formation. Congo red stain for amyloid is negative. Iron stain is negative. Trichrome stain confirms interstitial fibrosis.   - NYHA class: NYHA II-III  - Cardio Diagnostics:   - 01/03/25 TTE: Reviewed by Dr. Perales. + suction. Reduce VAD speed from 5500 rpm to 5100 rpm  - 11/27/24 TTE: Speed 5300 RPM, LVEF 14%, AoV leaflet partially open intermittently, mild MR, mild TR  - LVAD: VAD function stable. See VAD Interrogation above.  - Speed change: 1/3/25 DECREASE from 5500 rpm to 5100 rpm d/t sx event  - Speed change: 1/6/25 Ok to INC RPM from 5100 to 5200   - Speed change: 1/7/25 INCREASE from 5200 rpm to 5300 rpm   - Volume Status: appears euvolemic. Continue to hold diuretics. Low threshold to resume.   - Anticoagulation: INR Goal 2-3. Subtherapeutic, no heparin gtt at this time d/t vaginal bleeding, up-titrate coumadin.   - Inpatient coumadin managed by: [facility]  - Outpatient coumadin managed by: [facility] VAD. Pt has home INR monitor.  - Continue ASA 81 mg QD.  - LDH:  Stable. No signs of hemolysis.  - MAP/BP: MAPs at goal. Currently off all GDMT. MAP goal 70-90.   - GDMT: See changes to GDMT below.  - Beta Blocker: None d/t RVF  - ACE/ARB/ARNI: Resume Entresto 24/26 mg BID 1/7/25. OK per nephrology  - MRA: HOLDING Spironolactone d/t hyperkalemia/overdiuresis  - Diuretic: None  - SGLT-2: HOLDING Jardiance 10mg daily d/t OMAR  - Vasodilator/Nitrates: None  - RV Support: Digoxin 0.125 mg every other day (DIG LEVEL 1/3/25 0.7)  - AC: Warfarin  - Other: ASA 81mg daily  - Antiarrhythmic: None   - Cardiac Device Therapy: ICD   - Driveline Dressing Change Frequency/Type: Sterile wet kit QOD, no showering      OMAR  Hyperkalemia  - Creatinine/K+ now stable  - CREATININE: 0.95   - Received IV contrast 1/2/25 for CTA chest to r/o PE (pt became claustrophobic during test and did not complete)  - Likely r/t overdiuresis and IV contrast  - Nephrology consult, appreciate recs  - Holding Entresto, zehra,  jardiance, bactrim (per nephrology)  - RESUME Entresto 24/26 mg BID 1/7/25 -> ok per nephrology.   - Appears euvolemic off diuretics. CTM daily     Chest pain  - Improved   - CP under left breast, no s/s infection or trauma   - CT chest 1/5/25: reviewed by cv surgery, nothing of concern, no intervention, pain management consult ok.  - C/s pain management.      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 Mosley. No fluid collection, no indication for surgical intervention  - BC: 9/19/24: +MSSA, + Staph Aureus  - BC x 2 from 9/21/24: NGTD  - IV Daptomycin 700 mg QD EOT  10/25/24-> completed  - Keflex 500 mg 3 times a day& Bactrim prophylaxis 3 times a week, Per ID recs: low threshold to change antibiotics to IV pending clinical course, rec CT CAP d/t fevers   - 12/30/24 CT CAP & 1/5/25 CT chest reviewed by [facility], no surgical intervention at this time     Hx Staphylococcus aureus DL infection  - Denies fever chills.   - DL cx  (06/27/24) & (9/19/24) =Staphylococcus aureus   - On keflex 500mg TID per Dr. Arreguin for indefinite suppression  - wet kit QOD, no showering   - 11/27/24 CT CAP reviewed by Dr. Whitfield. - Dr. Arreguin notified of [facility] recs. Per Dr. Arreguin, no change in antibiotic recommendation. Continue Keflex 1000 mg BID, Bactrim prophylaxis 3x/weekly  - Per Dr. Whitfield, repeat CT CAP 2-3 weeks as an outpatient. Order placed   - 12/30/24 CT CAP & 1/5/25 CT chest reviewed by [facility], no surgical intervention at this time    COPD/Asthma; h/o 2 COVID admissions in the last year  - Current SOB is resolved, on O2 for comfort does not need per pt, off diuretics, resume lasix today  - has had two COVID admissions in the past year, potential for long COVID per Dr. Arreguin.   - Need PFTs and sleep study as outpatient  - Continue DuoNeb and alb inhaler  - Montelukast 10 mg stopped d/t black box warning per pulm in prior admission  - Flovent   - reschedule Dr. Cheek apt      Vaginal bleeding   - OB/gyne c/s,    - Hgb stable  - US reviewed, +endometrial thickening, uterine fibroids.   - Coumadin resumed 1/6/25  - 1/7/25 + vaginal bleeding today   - Gyne recommending outpatient EMB. Given pt is high risk for bleed due to LVAD, recommend performing inpatient, especially now that INR is subtherapeutic. Awaiting gyne response      HIV/AIDS+  - HAART therapy->Biktarvy and prezcobix  - Admit 12/24-12/28 with COVID. Found also to have recent HIV viral load of 10k and CD4 ct 113. Notes indicate that she had been off HAART x2 wks - which she currently denies.   - On Bactrim DS 3x week for Pneumocystis prophylaxis, considering when to resume per ID 1/6/25 d/t sky  - Pt follows with Dr. Arreguin   - has been compliant 11/13/24  - 11/2024: viral load not detected 11/6/24  - ID consult, appreciate recs   - ID to re-order HIV CD4 count and comment on OHT candidacy in oupt setting.     Hx of CVA  - CVA in 2019  - S/p thrombectomy and TPA  - No residuals  - CT  head (9/4/22) & (10/6/22) negative for acute abnormality   - Pt wants to keep ASA      OHT Interest: barriers-> compliance, BMI, PHTN  - A Rh Positive  - Body mass index is 35.84 kg/m².  - h/o HIV medication noncompliance, monitor medication compliance  - h/o PHTN, will need RHC   - ID to re-order HIV CD4 count and comment on OHT candidacy.       TODAY'S PLAN:  - VAD function stable. See VAD Interrogation above.   - INCREASE VAD SPEED FROM 5200 RPM to 5300 RPM  - MAPs at goal. Currently off all GDMT. MAP goal 70-90. RESUME Entresto 24/26 mg BID -> ok per renal   - consider vericiguat to start in outpt setting tomorrow if BP stable for RV support, can see if drug is covered.   - Appears euvolemic. Pt states she dose not feel like she is retaining fluid. Currently holding diuretics. CTM daily. Low threshold to resume  - INR Goal 2-3. INR today 1.1. No heparin gtt at this time d/t vaginal bleeding, up-titrate coumadin. [place] [facility] [facility]   - Creatinine improved. Renal on consult, appreciate recs.  - C/s pain management for chest pain.   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 ID to determine oif ok to resume bactrim now that renal function improved->check G6PD. Bactrim resumed. ID to re-order HIV CD4 count and comment on OHT candidacy in outpt setting.   - + Mild vaginal bleed. OB/gyne c/s, US reviewed, +endometrial thickening, uterine fibroids. Gyne recommending outpatient EMB. Given pt is high risk for bleed d/t LVAD, recommend inpatient EMB. Gyne notified. Awaiting INR goal for procedure.   - Given pt still w/vaginal bleeding with INR of 1.1, concern for worsening bleed when INR becomes therapeutic. Plan to dc if hgb/bleeding remains stable with INR closer to therapeutic and ideally after EMB. Awaiting approval from gyne for inpatient EMB as above.       Plan of care discussed with heart failure VAD cardiologist and rounding CV surgeon.      I attest that I spent 60 total minutes for this patient’s encounter. This total time including the following components: Preparation on the same day, obtaining and/or reviewing history, examination/evaluation, counseling/education, ordering tests/medications/procedures, referring and communication with other health care providers, documentation, independently interpreting results, and care coordination. Greater than 50% of visit included counseling and coordination of care, including education regarding device safety and when to notify VAD Coordinator on call, follow-up care, reducing the risk of adverse events with mechanical circulatory support, and communication with primary care provider and other members of multi-disciplinary team.       Elena Martines MSN, APN, CVNP-BC, AGPCNP-BC  [facility] VAD Coordinator  Advanced Heart Failure, Mechanical Circulatory Support, and Transplant Cardiology  [facility]      ATTENDING ADDENDUM:    I have interviewed and examined patient. I independently reviewed all imaging, labs, procedures, and other studies. I performed the medical decision making. I have reviewed the patient's medical history, physical exam findings, and the assessment and plan as documented in PA/NP Bobbi's note. I am in agreement with the plan of care as documented in the note with the following additions.     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 Will increase to 5300 for more CO  - Appears still volume down. Holding diuretics  - BP adequate restarting ARNi  - We should see if vericiguat is approved for HFrEF. We can start as OP -- may also help with her cpc-PH  - Home when vaginal bleeding stop    VAD INTERROGATION  Device Type: HeartMate3  Implant Date: 3/26/21  Flow: 3.9 L/min Pump Speed: 5200 PI: 7.7 Power: 3.9 W   Low Speed Settin HCT: 20%   Back-up battery used 5 times for 0  minutes  Change Back-up Battery in 27 months  Abnormal Trends: Asymptomatic with frequent PI events  Alarms: Low Voltage Advisory  Changes Made: 5200-->5300  Log Files Sent: No    High complexity due to above diagnoses and high risk of decompensation to death and morbidity    Thank you for allowing us to participate in the care of your patient. Please contact us with questions or concerns.    Mateusz Patel MD  [facility]  Advanced Heart Failure, Mechanical Circulatory Support, and Transplant Cardiology       52

## 2025-03-06 NOTE — OCCUPATIONAL THERAPY INITIAL EVALUATION ADULT - PERTINENT HX OF CURRENT PROBLEM, REHAB EVAL
Pre assessment completed for upcoming procedure.   (Please see previous telephone encounter notes for complete details)    Patient returned call.       Procedure details:    Arrival time and facility location reviewed.    Pre op exam needed? No.    Designated  policy reviewed. Instructed to have someone stay 6  hours post procedure.       Medication review:    Medications reviewed. Please see supporting documentation below. Holding recommendations discussed (if applicable).       Prep for procedure:     Procedure prep instructions reviewed.        Any additional information needed:  N/A      Patient verbalized understanding and had no questions or concerns at this time.      Linda Rodriguez LPN  Endoscopy Procedure Pre Assessment   [phone] option 3   76 y/o F presents as transfer from [place] as code stroke with hx of a-fib on coumadin, she recent stopped taking for dental procedure for 15 days. Patient presented with stroke like symptoms and CT head with acute infarct. She underwent thrombectomy

## 2025-07-21 ENCOUNTER — OUTPATIENT (OUTPATIENT)
Dept: OUTPATIENT SERVICES | Facility: HOSPITAL | Age: 80
LOS: 1 days | End: 2025-07-21
Payer: COMMERCIAL

## 2025-07-21 ENCOUNTER — APPOINTMENT (OUTPATIENT)
Dept: RADIOLOGY | Facility: CLINIC | Age: 80
End: 2025-07-21
Payer: MEDICARE

## 2025-07-21 DIAGNOSIS — M81.0 AGE-RELATED OSTEOPOROSIS WITHOUT CURRENT PATHOLOGICAL FRACTURE: ICD-10-CM

## 2025-07-21 DIAGNOSIS — Z95.0 PRESENCE OF CARDIAC PACEMAKER: Chronic | ICD-10-CM

## 2025-07-21 PROCEDURE — 77080 DXA BONE DENSITY AXIAL: CPT | Mod: 26

## 2025-07-21 PROCEDURE — 77080 DXA BONE DENSITY AXIAL: CPT

## 2025-08-06 NOTE — DISCHARGE NOTE PROVIDER - NSDCHOSPICE_GEN_A_CORE
Take steroid in the morning and take with food.  Apply cream twice per day; apply a thin layer. May use calamine lotion in between times.  Benadryl may be used at bedtime for itching.  Increase fluid intake and rest.  Return with any new or worsening symptoms. Follow up with PCP as needed.   Proceed to the nearest ER with any chest pain, shortness of breath, difficulty swallowing or breathing, worsening pain.   
No

## (undated) DEVICE — GOWN ISOLATION WHITE

## (undated) DEVICE — VENODYNE/SCD SLEEVE CALF MEDIUM

## (undated) DEVICE — ELCTR NESSY OMEGA RETURN 85CM W/4M CBL

## (undated) DEVICE — APPLICATOR Q TIP 6" WOOD STEM

## (undated) DEVICE — TUBING SUCTION 20FT

## (undated) DEVICE — WARMING BLANKET FULL ADULT

## (undated) DEVICE — SUCTION YANKAUER OPEN TIP NO VENT CURVE

## (undated) DEVICE — BITE BLOCK ADULT 20 X 27MM (GREEN)

## (undated) DEVICE — FORCEP RADIAL JAW 4 W NDL 2.2MM 2.8MM 240CM ORANGE DISP

## (undated) DEVICE — CATH ELECHMSTAT  INJ 7FR 210CM

## (undated) DEVICE — SOL IRR POUR NS 0.9% 1000ML

## (undated) DEVICE — TRAP SUCTION POLYP ENDODYNAMIC

## (undated) DEVICE — SOL IRR POUR H2O 1000ML

## (undated) DEVICE — Device

## (undated) DEVICE — SNARE OVAL LOOP MICOR

## (undated) DEVICE — SYR ALLIANCE II INFLATION 60ML

## (undated) DEVICE — PROBE FIAPC DIA 2.3MM/7FR LNTH 220CM/7.2FT

## (undated) DEVICE — NDL INJ SCLERO INTERJECT 25G

## (undated) DEVICE — TUBING CAP SET ENDO 24HR USE GI

## (undated) DEVICE — RETRIEVER ROTH NET PLATINUM-UNIVERSAL

## (undated) DEVICE — TUBING SUCTION CONN 6FT STERILE

## (undated) DEVICE — GLV 8 PROTEXIS (WHITE)

## (undated) DEVICE — PROBE FIAPC CIRC O.D. 2.3MM/6.9FR LNTH 220CM/7.2FT

## (undated) DEVICE — SYR CATH TIP 2 OZ

## (undated) DEVICE — DRSG CURITY GAUZE SPONGE 4 X 4" 12-PLY